# Patient Record
Sex: MALE | Race: BLACK OR AFRICAN AMERICAN | Employment: OTHER | URBAN - METROPOLITAN AREA
[De-identification: names, ages, dates, MRNs, and addresses within clinical notes are randomized per-mention and may not be internally consistent; named-entity substitution may affect disease eponyms.]

---

## 2017-06-02 ENCOUNTER — HOSPITAL ENCOUNTER (OUTPATIENT)
Dept: LAB | Age: 72
Discharge: HOME | End: 2017-06-02
Attending: INTERNAL MEDICINE
Payer: MEDICARE

## 2017-06-02 DIAGNOSIS — R06.02 SHORTNESS OF BREATH: ICD-10-CM

## 2017-06-02 LAB
BASOPHILS # BLD AUTO: 0 K/UL (ref 0–0.2)
BASOPHILS # BLD: 0 % (ref 0–2)
DIFFERENTIAL METHOD BLD: ABNORMAL
EOSINOPHIL # BLD: 0.2 K/UL (ref 0–0.8)
EOSINOPHIL NFR BLD: 4 % (ref 0.5–7.8)
ERYTHROCYTE [DISTWIDTH] IN BLOOD BY AUTOMATED COUNT: 13.2 % (ref 11.9–14.6)
HCT VFR BLD AUTO: 37.6 % (ref 41.1–50.3)
HGB BLD-MCNC: 12.3 G/DL (ref 13.6–17.2)
LYMPHOCYTES # BLD AUTO: 26 % (ref 13–44)
LYMPHOCYTES # BLD: 1.2 K/UL (ref 0.5–4.6)
MCH RBC QN AUTO: 30 PG (ref 26.1–32.9)
MCHC RBC AUTO-ENTMCNC: 32.7 G/DL (ref 31.4–35)
MCV RBC AUTO: 91.7 FL (ref 79.6–97.8)
MONOCYTES # BLD: 0.7 K/UL (ref 0.1–1.3)
MONOCYTES NFR BLD AUTO: 16 % (ref 4–12)
NEUTS SEG # BLD: 2.4 K/UL (ref 1.7–8.2)
NEUTS SEG NFR BLD AUTO: 54 % (ref 43–78)
PLATELET # BLD AUTO: 159 K/UL (ref 150–450)
PMV BLD AUTO: 8.8 FL (ref 10.8–14.1)
RBC # BLD AUTO: 4.1 M/UL (ref 4.23–5.67)
WBC # BLD AUTO: 4.5 K/UL (ref 4.3–11.1)

## 2017-06-02 PROCEDURE — 36415 COLL VENOUS BLD VENIPUNCTURE: CPT | Performed by: INTERNAL MEDICINE

## 2017-06-02 PROCEDURE — 85025 COMPLETE CBC W/AUTO DIFF WBC: CPT | Performed by: INTERNAL MEDICINE

## 2018-01-03 PROBLEM — I35.8 AORTIC VALVE SCLEROSIS: Status: ACTIVE | Noted: 2018-01-03

## 2018-01-03 PROBLEM — Z95.1 HX OF CABG: Status: ACTIVE | Noted: 2018-01-03

## 2018-03-08 ENCOUNTER — ANESTHESIA EVENT (OUTPATIENT)
Dept: ENDOSCOPY | Age: 73
End: 2018-03-08
Payer: MEDICARE

## 2018-03-08 RX ORDER — SODIUM CHLORIDE, SODIUM LACTATE, POTASSIUM CHLORIDE, CALCIUM CHLORIDE 600; 310; 30; 20 MG/100ML; MG/100ML; MG/100ML; MG/100ML
100 INJECTION, SOLUTION INTRAVENOUS CONTINUOUS
Status: CANCELLED | OUTPATIENT
Start: 2018-03-08

## 2018-03-08 RX ORDER — SODIUM CHLORIDE 0.9 % (FLUSH) 0.9 %
5-10 SYRINGE (ML) INJECTION AS NEEDED
Status: CANCELLED | OUTPATIENT
Start: 2018-03-08

## 2018-03-09 ENCOUNTER — ANESTHESIA (OUTPATIENT)
Dept: ENDOSCOPY | Age: 73
End: 2018-03-09
Payer: MEDICARE

## 2018-03-09 ENCOUNTER — HOSPITAL ENCOUNTER (OUTPATIENT)
Age: 73
Setting detail: OUTPATIENT SURGERY
Discharge: HOME | End: 2018-03-09
Attending: SURGERY | Admitting: SURGERY
Payer: MEDICARE

## 2018-03-09 VITALS
TEMPERATURE: 96.8 F | WEIGHT: 198 LBS | HEIGHT: 71 IN | DIASTOLIC BLOOD PRESSURE: 76 MMHG | RESPIRATION RATE: 18 BRPM | HEART RATE: 57 BPM | SYSTOLIC BLOOD PRESSURE: 139 MMHG | BODY MASS INDEX: 27.72 KG/M2 | OXYGEN SATURATION: 100 %

## 2018-03-09 PROCEDURE — 74011250636 HC RX REV CODE- 250/636

## 2018-03-09 PROCEDURE — 77030011640 HC PAD GRND REM COVD -A: Performed by: SURGERY

## 2018-03-09 PROCEDURE — 88305 TISSUE EXAM BY PATHOLOGIST: CPT | Performed by: SURGERY

## 2018-03-09 PROCEDURE — 74011250636 HC RX REV CODE- 250/636: Performed by: ANESTHESIOLOGY

## 2018-03-09 PROCEDURE — 77030009426 HC FCPS BIOP ENDOSC BSC -B: Performed by: SURGERY

## 2018-03-09 PROCEDURE — 76040000025: Performed by: SURGERY

## 2018-03-09 PROCEDURE — 76060000032 HC ANESTHESIA 0.5 TO 1 HR: Performed by: SURGERY

## 2018-03-09 PROCEDURE — 74011000250 HC RX REV CODE- 250

## 2018-03-09 RX ORDER — LIDOCAINE HYDROCHLORIDE 20 MG/ML
INJECTION, SOLUTION EPIDURAL; INFILTRATION; INTRACAUDAL; PERINEURAL AS NEEDED
Status: DISCONTINUED | OUTPATIENT
Start: 2018-03-09 | End: 2018-03-09 | Stop reason: HOSPADM

## 2018-03-09 RX ORDER — PROPOFOL 10 MG/ML
INJECTION, EMULSION INTRAVENOUS AS NEEDED
Status: DISCONTINUED | OUTPATIENT
Start: 2018-03-09 | End: 2018-03-09 | Stop reason: HOSPADM

## 2018-03-09 RX ORDER — PROPOFOL 10 MG/ML
INJECTION, EMULSION INTRAVENOUS
Status: DISCONTINUED | OUTPATIENT
Start: 2018-03-09 | End: 2018-03-09 | Stop reason: HOSPADM

## 2018-03-09 RX ORDER — SODIUM CHLORIDE, SODIUM LACTATE, POTASSIUM CHLORIDE, CALCIUM CHLORIDE 600; 310; 30; 20 MG/100ML; MG/100ML; MG/100ML; MG/100ML
100 INJECTION, SOLUTION INTRAVENOUS CONTINUOUS
Status: DISCONTINUED | OUTPATIENT
Start: 2018-03-09 | End: 2018-03-09 | Stop reason: HOSPADM

## 2018-03-09 RX ADMIN — PROPOFOL 40 MG: 10 INJECTION, EMULSION INTRAVENOUS at 08:13

## 2018-03-09 RX ADMIN — PROPOFOL 160 MCG/KG/MIN: 10 INJECTION, EMULSION INTRAVENOUS at 08:13

## 2018-03-09 RX ADMIN — SODIUM CHLORIDE, SODIUM LACTATE, POTASSIUM CHLORIDE, AND CALCIUM CHLORIDE 100 ML/HR: 600; 310; 30; 20 INJECTION, SOLUTION INTRAVENOUS at 07:34

## 2018-03-09 RX ADMIN — LIDOCAINE HYDROCHLORIDE 60 MG: 20 INJECTION, SOLUTION EPIDURAL; INFILTRATION; INTRACAUDAL; PERINEURAL at 08:13

## 2018-03-09 NOTE — ANESTHESIA POSTPROCEDURE EVALUATION
Post-Anesthesia Evaluation and Assessment    Patient: Gloria Pizarro Sr. MRN: [id number]  SSN: [id number]    YOB: 1945  Age: 68 y.o. Sex: male       Cardiovascular Function/Vital Signs  Visit Vitals    /67    Pulse (!) 50    Temp 36 °C (96.8 °F)    Resp 16    Ht 5' 11\" (1.803 m)    Wt 89.8 kg (198 lb)    SpO2 100%    BMI 27.62 kg/m2       Patient is status post total IV anesthesia anesthesia for Procedure(s):  COLONOSCOPY/ 28  ENDOSCOPIC POLYPECTOMY. Nausea/Vomiting: None    Postoperative hydration reviewed and adequate. Pain:  Pain Scale 1: Visual (03/09/18 0844)  Pain Intensity 1: 0 (03/09/18 0844)   Managed    Neurological Status: At baseline    Mental Status and Level of Consciousness: Awake. Pulmonary Status:   O2 Device: Nasal cannula (03/09/18 0849)   Adequate oxygenation and airway patent    Complications related to anesthesia: None    Post-anesthesia assessment completed.  No concerns    Signed By: Jennifer An MD     March 9, 2018

## 2018-03-09 NOTE — H&P
Colonoscopy History and Physical      Patient: Yousif Duckworth Sr.    Physician: Naima Hassan MD    Referring Physician: Joshua Zavaleta MD    Chief Complaint: For colonoscopy    History of Present Illness: Pt presents for colonoscopy. He reports 2 prior exams- polyps on first exam about 7 years ago, then a negative f/u exam 5+ years ago with f/u recommended for 5 years by endoscopist.    History:  Past Medical History:   Diagnosis Date    Arrhythmia 8/2015 at [facility]    ablation--- dr Sukhdeep Casey    Aspirin long-term use     CAD (coronary artery disease)     mi--2/2015 cabg 2/2015----     CAD in native artery 2/19/2015    2/19/15 (Dr Mayo Díaz) Coronary artery bypass grafting x2 with grafts consisting of bilateral mammaries     Elevated hemidiaphragm 2/21/2015 2/20 barium swallow on 2/12/15 that revealed an unremarkable esophagus and paralyzed left diaphragm with an unusual rotation of the stomach in the left upper quadrant  esophagram 2/12/2015 which revealed an abnormal contour the left hemidiaphragm, suggesting diaphragmatic hernia. 2/20 Barium Swallow Barium swallow today to assess for any signs of possible torsion. If no torsion present, will likely recommend outpatient surgical consult and continued follow up with Dr. Buena Spurling in [facility].       Elevated hemoglobin A1c 2/20/2015    6.1 - 2/17/15     Full dentures 2/21/2015    GERD (gastroesophageal reflux disease)     controlled with med    Hernia of abdominal cavity 6/26/2015    History of atrial flutter 8/2015    ablation    History of complete eye exam 01/01/2015    [facility]    Hypercholesterolemia     Hyperlipidemia     Hyperlipidemia 2/18/2015    Hypertension     controlled with med    Hypoxemia 2/21/2015    MI, old 2/2015    NSTEMI (non-ST elevated myocardial infarction) (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 Susan Mcallister) R [facility] Radiofrequency ablation     Wears dentures      Past Surgical History:   Procedure Laterality Date    HX COLONOSCOPY  2012    HX CORONARY ARTERY BYPASS GRAFT  2/15    DR. Patricia Perez HX FRACTURE TX Right 2007    elbow fx    HX HEART CATHETERIZATION  2/2015 and 8/15    HX HEART CATHETERIZATION  8/2015    ablation    HX HERNIA REPAIR  7741    umbilical    HX HERNIA REPAIR Left     [facility]    HX KNEE ARTHROSCOPY Left 2006    left knee    HX VEIN STRIPPING        Social History     Social History    Marital status: SINGLE     Spouse name: N/A    Number of children: N/A    Years of education: N/A     Social History Main Topics    Smoking status: Former Smoker     Packs/day: 0.25     Years: 40.00     Quit date: 4/26/2001    Smokeless tobacco: Never Used      Comment: quit 2004    Alcohol use No    Drug use: No    Sexual activity: Not on file     Other Topics Concern    Not on file     Social History Narrative      Family History   Problem Relation Age of Onset    Heart Disease Brother     Hypertension Brother     Heart Disease Father     Hypertension Sister     Hypertension Brother     No Known Problems Mother        Medications:   Prior to Admission medications    Medication Sig Start Date End Date Taking? Authorizing Provider   omeprazole (PRILOSEC) 20 mg capsule TAKE 1 CAPSULE TWO TIMES A DAY FOR GASTROESOPHAGEAL REFLUX 1/18/18  Yes Cheryle Acevedo MD   metoprolol tartrate (LOPRESSOR) 25 mg tablet TAKE 1 TABLET TWICE DAILY FOR HYPERTENSION 1/18/18  Yes Cheryle Acevedo MD   lisinopril (PRINIVIL, ZESTRIL) 20 mg tablet TAKE 1 TABLET TWICE DAILY FOR HYPERTENSION 1/18/18  Yes Cheryle Acevedo MD   doxazosin (CARDURA) 8 mg tablet Take 1 Tab by mouth two (2) times a day. 10/26/17  Yes Lexa Lucas MD   pravastatin (PRAVACHOL) 40 mg tablet TAKE 1 TABLET TWICE DAILY  (DOSE  INCREASE) 10/11/17  Yes Lexa Lucas MD   ferrous sulfate 325 mg (65 mg iron) tablet Take 325 mg by mouth Daily (before breakfast). Yes Historical Provider   aspirin delayed-release 81 mg tablet Take 81 mg by mouth every morning. Indications: continue   Yes Historical Provider   Fish Oil-Omega-3 Fatty Acids ([address] [place] [address] Road 3-6-9) 300-1,000 mg cpDR Take 2 Tabs by mouth every morning. Indications: last dose 3/7/18   Yes Historical Provider   MULTIVITS-MINERALS/FA/LYCOPENE (ONE-A-DAY MEN'S PO) Take 1 Tab by mouth daily. Indications: last dose 3/7/18   Yes Historical Provider   CALCIUM CARB/VIT D3/MINERALS (CALCIUM-VITAMIN D PO) Take 1 Tab by mouth nightly. Yes Historical Provider       Allergies: No Known Allergies    Physical Exam:     Vital Signs:   Visit Vitals    /67    Pulse 96    Temp 98 °F (36.7 °C)    Ht 5' 11\" (1.803 m)    Wt 198 lb (89.8 kg)    SpO2 98%    BMI 27.62 kg/m2     . General: in NAD      Heart: regular   Lungs: unlabored   Abdominal: soft   Neurological: grossly normal        Findings/Diagnosis: Screening for colorectal cancer     Plan of Care/Planned Procedure: Colonoscopy. Risks of endoscopy include  bleeding, perforation. They understand and agree to proceed.       Signed:  Soniya Marcos MD   3/9/2018

## 2018-03-09 NOTE — IP AVS SNAPSHOT
[facility] 
 
 
 [address] [facility] 
[phone] Patient: Jim Self Sr. MRN: [id number] FHI:9[birth year]21/[birth year] About your hospitalization You were admitted on:  March 9, 2018 You last received care in the:  D ENDOSCOPY You were discharged on:  March 9, 2018 Why you were hospitalized Your primary diagnosis was:  Not on File Follow-up Information None Discharge Orders None A check patrick indicates which time of day the medication should be taken. My Medications ASK your doctor about these medications Instructions Each Dose to Equal  
 Morning Noon Evening Bedtime  
 aspirin delayed-release 81 mg tablet Your last dose was: Your next dose is: Take 81 mg by mouth every morning. Indications: continue 81 mg CALCIUM-VITAMIN D PO Your last dose was: Your next dose is: Take 1 Tab by mouth nightly. 1 Tab  
    
   
   
   
  
 doxazosin 8 mg tablet Commonly known as:  CARDURA Your last dose was: Your next dose is: Take 1 Tab by mouth two (2) times a day. 8 mg  
    
   
   
   
  
 ferrous sulfate 325 mg (65 mg iron) tablet Your last dose was: Your next dose is: Take 325 mg by mouth Daily (before breakfast). 325 mg FISH OIL OMEGA 3-6-9 300-1,000 mg Cpdr  
Generic drug:  Fish Oil-Omega-3 Fatty Acids Your last dose was: Your next dose is: Take 2 Tabs by mouth every morning. Indications: last dose 3/7/18  
 2 Tab  
    
   
   
   
  
 lisinopril 20 mg tablet Commonly known as:  Edwina Smyth Your last dose was: Your next dose is: TAKE 1 TABLET TWICE DAILY FOR HYPERTENSION  
     
   
   
   
  
 metoprolol tartrate 25 mg tablet Commonly known as:  LOPRESSOR Your last dose was: Your next dose is: TAKE 1 TABLET TWICE DAILY FOR HYPERTENSION  
     
   
   
   
  
 omeprazole 20 mg capsule Commonly known as:  PRILOSEC Your last dose was: Your next dose is: TAKE 1 CAPSULE TWO TIMES A DAY FOR GASTROESOPHAGEAL REFLUX  
     
   
   
   
  
 ONE-A-DAY MEN'S PO Your last dose was: Your next dose is: Take 1 Tab by mouth daily. Indications: last dose 3/7/18  
 1 Tab  
    
   
   
   
  
 pravastatin 40 mg tablet Commonly known as:  PRAVACHOL Your last dose was: Your next dose is: TAKE 1 TABLET TWICE DAILY  (DOSE  INCREASE) Discharge Instructions Gastrointestinal Colonoscopy/Flexible Sigmoidoscopy - Lower Exam Discharge Instructions 1. Call Dr. Sonido Carias for any problems or questions. 2. Contact the doctors office for follow up appointment as directed 3. Medication may cause drowsiness for several hours, therefore, do not drive or operate machinery for remainder of the day. 4. No alcohol today. 5. Ordinarily, you may resume regular diet and activity after exam unless otherwise specified by your physician. 6. Because of air put into your colon during exam, you may experience some abdominal distension, relieved by the passage of gas, for several hours. 7. Contact your physician if you have any of the following: 
a. Excessive amount of bleeding  large amount when having a bowel movement. Occasional specks of blood with bowel movement would not be unusual. 
b. Severe abdominal pain 
c. Fever or Chills 8. Polyp Removal  follow these additional instructions 
a. Take Metamucil  1 tablespoon in juice every morning for 3 days b. No Aspirin, Advil, Aleve, Nuprin, Ibuprofen, or medications that contain these drugs for 2 weeks. Any additional instructions:  Check pathology in one week. Instructions given to Brian Dela Cruz Sr. and other family members. Instructions given by: Dr. Smith Walton O Transitions of Care Introducing [employer] offers a voluntary care coordination program to provide high quality service and care to [facility] fee-for-service beneficiaries. Shahbaz Flaherty was designed to help you enhance your health and well-being through the following services: ? Transitions of Care  support for individuals who are transitioning from one care setting to another (example: Hospital to home). ? Chronic and Complex Care Coordination  support for individuals and caregivers of those with serious or chronic illnesses or with more than one chronic (ongoing) condition and those who take a number of different medications. If you meet specific medical criteria, a [facility] [address] may call you directly to coordinate your care with your primary care physician and your other care providers. For questions about the [facility] programs, please, contact your physicians office. For general questions or additional information about Accountable Care Organizations: 
Please visit www.medicare.gov/acos. html or call 1-800-MEDICARE (0-159.388.7773) TTY users should call 0-226.143.6414. Introducing [address] [facility] introduces [employer] patient portal. Now you can access parts of your medical record, email your doctor's office, and request medication refills online. 1. In your internet browser, go to [email or web address] 2. Click on the First Time User? Click Here link in the Sign In box. You will see the New Member Sign Up page. 3. Enter your [employer] Access Code exactly as it appears below. You will not need to use this code after youve completed the sign-up process. If you do not sign up before the expiration date, you must request a new code. · [employer] Access Code: [email or web address] Expires: 6/4/2018  1:25 PM 
 
 4. Enter the last four digits of your Social Security Number (xxxx) and Date of Birth (mm/dd/yyyy) as indicated and click Submit. You will be taken to the next sign-up page. 5. Create a [employer] ID. This will be your [employer] login ID and cannot be changed, so think of one that is secure and easy to remember. 6. Create a [employer] password. You can change your password at any time. 7. Enter your Password Reset Question and Answer. This can be used at a later time if you forget your password. 8. Enter your e-mail address. You will receive e-mail notification when new information is available in [address] 9. Click Sign Up. You can now view and download portions of your medical record. 10. Click the Download Summary menu link to download a portable copy of your medical information. If you have questions, please visit the Frequently Asked Questions section of the [employer] website. Remember, [employer] is NOT to be used for urgent needs. For medical emergencies, dial 911. Now available from your iPhone and Android! Providers Seen During Your Hospitalization Provider Specialty Primary office phone Belkis Lenz MD General Surgery [phone] Your Primary Care Physician (PCP) Primary Care Physician Office Phone Office Fax Namrata Barclay [phone] You are allergic to the following No active allergies Recent Documentation Height Weight BMI Smoking Status 1.803 m 89.8 kg 27.62 kg/m2 Former Smoker Emergency Contacts Name Discharge Info Relation Home Work Mobile Angus Caryign [24] [phone] Cristino Reyes DISCHARGE CAREGIVER [3] Son [22] [phone] Patient Belongings The following personal items are in your possession at time of discharge: 
  Dental Appliances: At bedside  Visual Aid: Glasses, At bedside Please provide this summary of care documentation to your next provider. Signatures-by signing, you are acknowledging that this After Visit Summary has been reviewed with you and you have received a copy. Patient Signature:  ____________________________________________________________ Date:  ____________________________________________________________  
  
Agata Mealing Provider Signature:  ____________________________________________________________ Date:  ____________________________________________________________

## 2018-03-09 NOTE — ANESTHESIA PREPROCEDURE EVALUATION
Anesthetic History   No history of anesthetic complications            Review of Systems / Medical History  Pertinent labs reviewed    Pulmonary  Within defined limits                 Neuro/Psych   Within defined limits           Cardiovascular    Hypertension        Dysrhythmias (s/p ablation 2015) : atrial flutter  Past MI (2015), CAD and CABG (2015)    Exercise tolerance: >4 METS     GI/Hepatic/Renal     GERD           Endo/Other        Arthritis     Other Findings            Physical Exam    Airway  Mallampati: I  TM Distance: 4 - 6 cm  Neck ROM: normal range of motion   Mouth opening: Normal     Cardiovascular  Regular rate and rhythm,  S1 and S2 normal,  no murmur, click, rub, or gallop             Dental    Dentition: Full lower dentures and Full upper dentures     Pulmonary  Breath sounds clear to auscultation               Abdominal  GI exam deferred       Other Findings            Anesthetic Plan    ASA: 3  Anesthesia type: total IV anesthesia          Induction: Intravenous  Anesthetic plan and risks discussed with: Patient

## 2018-03-09 NOTE — DISCHARGE INSTRUCTIONS
Gastrointestinal Colonoscopy/Flexible Sigmoidoscopy - Lower Exam Discharge Instructions  1. Call Dr. Bibiana Dawkins for any problems or questions. 2. Contact the doctors office for follow up appointment as directed  3. Medication may cause drowsiness for several hours, therefore, do not drive or operate machinery for remainder of the day. 4. No alcohol today. 5. Ordinarily, you may resume regular diet and activity after exam unless otherwise specified by your physician. 6. Because of air put into your colon during exam, you may experience some abdominal distension, relieved by the passage of gas, for several hours. 7. Contact your physician if you have any of the following:  a. Excessive amount of bleeding - large amount when having a bowel movement. Occasional specks of blood with bowel movement would not be unusual.  b. Severe abdominal pain  c. Fever or Chills  8. Polyp Removal - follow these additional instructions  a. Take Metamucil - 1 tablespoon in juice every morning for 3 days  b. No Aspirin, Advil, Aleve, Nuprin, Ibuprofen, or medications that contain these drugs for 2 weeks. Any additional instructions:  Check pathology in one week. Instructions given to Zhou Agarwal Sr. and other family members.   Instructions given by: Dr. Bibiana Dawkins

## 2018-03-09 NOTE — ROUTINE PROCESS
Discharge instructions given to son. IV discontinued with skin c/d/i. Pt has passed flatus and tolerating liquids well. Pt ready for discharge.

## 2018-03-09 NOTE — PROCEDURES
Procedure in Detail:  Informed consent was obtained for the procedure. The patient was placed in the left lateral decubitus position and sedation was induced by anesthesia. The [id number] was inserted into the rectum and advanced under direct vision to the cecum, which was identified by the appendiceal orifice. The quality of the colonic preparation was adequate. A careful inspection was made as the colonoscope was withdrawn, including a retroflexed view of the rectum; findings and interventions are described below. Appropriate photodocumentation was obtained. Findings:   Rectum:   Normal  Sigmoid:     - Excavated lesions:     - Diverticulosis  Descending Colon:     - Excavated lesions:     - Diverticulosis    - Protruding lesions:     -Sessile Polyp(s) size 4 mm removed by polypectomy (hot biopsy)  Transverse Colon:     - Excavated lesions:     - Diverticulosis  Ascending Colon:   Normal  Cecum:   Normal          Specimens: Specimens were collected and sent to pathology. Complications: None; patient tolerated the procedure well. \    EBL - none    Recommendations:   - Await pathology. - If adenoma is present, repeat colonoscopy in 5 years.      Signed By: Nitish Harper MD                        March 9, 2018

## 2018-12-05 ENCOUNTER — HOSPITAL ENCOUNTER (OUTPATIENT)
Dept: GENERAL RADIOLOGY | Age: 73
Discharge: HOME | End: 2018-12-05
Attending: UROLOGY
Payer: MEDICARE

## 2018-12-05 ENCOUNTER — HOSPITAL ENCOUNTER (OUTPATIENT)
Dept: SURGERY | Age: 73
Discharge: HOME | End: 2018-12-05
Payer: MEDICARE

## 2018-12-05 VITALS
OXYGEN SATURATION: 98 % | SYSTOLIC BLOOD PRESSURE: 149 MMHG | TEMPERATURE: 98.2 F | WEIGHT: 203.25 LBS | DIASTOLIC BLOOD PRESSURE: 76 MMHG | BODY MASS INDEX: 30.1 KG/M2 | HEART RATE: 55 BPM | RESPIRATION RATE: 16 BRPM | HEIGHT: 69 IN

## 2018-12-05 LAB
ANION GAP SERPL CALC-SCNC: 5 MMOL/L (ref 7–16)
BUN SERPL-MCNC: 10 MG/DL (ref 8–23)
CALCIUM SERPL-MCNC: 8.9 MG/DL (ref 8.3–10.4)
CHLORIDE SERPL-SCNC: 104 MMOL/L (ref 98–107)
CO2 SERPL-SCNC: 29 MMOL/L (ref 21–32)
CREAT SERPL-MCNC: 0.71 MG/DL (ref 0.8–1.5)
ERYTHROCYTE [DISTWIDTH] IN BLOOD BY AUTOMATED COUNT: 13.6 % (ref 11.9–14.6)
GLUCOSE SERPL-MCNC: 93 MG/DL (ref 65–100)
HCT VFR BLD AUTO: 39.3 % (ref 41.1–50.3)
HGB BLD-MCNC: 12.6 G/DL (ref 13.6–17.2)
MCH RBC QN AUTO: 29.8 PG (ref 26.1–32.9)
MCHC RBC AUTO-ENTMCNC: 32.1 G/DL (ref 31.4–35)
MCV RBC AUTO: 92.9 FL (ref 79.6–97.8)
NRBC # BLD: 0 K/UL (ref 0–0.2)
PLATELET # BLD AUTO: 168 K/UL (ref 150–450)
PMV BLD AUTO: 9.3 FL (ref 9.4–12.3)
POTASSIUM SERPL-SCNC: 4.4 MMOL/L (ref 3.5–5.1)
RBC # BLD AUTO: 4.23 M/UL (ref 4.23–5.6)
SODIUM SERPL-SCNC: 138 MMOL/L (ref 136–145)
WBC # BLD AUTO: 4.1 K/UL (ref 4.3–11.1)

## 2018-12-05 PROCEDURE — 85027 COMPLETE CBC AUTOMATED: CPT

## 2018-12-05 PROCEDURE — 71045 X-RAY EXAM CHEST 1 VIEW: CPT

## 2018-12-05 PROCEDURE — 80048 BASIC METABOLIC PNL TOTAL CA: CPT

## 2018-12-05 PROCEDURE — 93005 ELECTROCARDIOGRAM TRACING: CPT | Performed by: UROLOGY

## 2018-12-05 NOTE — PERIOP NOTES
Patient verified name and . Patient provided medical/health information and PTA medications to the best of their ability. TYPE  CASE:ll 
Order for consent  found in EHR and matches case posting. Labs per surgeon:CBC, BMP, CXR, EKG. Labs per anesthesia protocol: None. EKG  :  Today, and 2017 Rt BBB and PAC's. Hx CABG x2, No stents. Hx AFlutter Ablation. No cardiac events since procedures. Patient provided with and instructed on education handouts including Guide to Surgery, blood transfusions, pain management, and hand hygiene for the family and community, and [facility] brochure. Antibacterial soap and HIbiclens instructions given per hospital policy. Instructed patient to continue previous medications as prescribed prior to surgery unless otherwise directed and to take the following medications the day of surgery according to anesthesia guidelines : ASA 81 mg, Doxazosin, Metoprolol, Omeprazole, Pravastatin . Instructed patient to hold  the following medications: Vitamins / Supplements. Original medication prescription bottles were visualized during patient appointment. Patient teach back successful and patient demonstrates knowledge of instruction.

## 2018-12-05 NOTE — PERIOP NOTES
Labs reviewed and routed to surgeon. Recent Results (from the past 12 hour(s)) CBC W/O DIFF Collection Time: 12/05/18 11:33 AM  
Result Value Ref Range WBC 4.1 (L) 4.3 - 11.1 K/uL  
 RBC 4.23 4.23 - 5.6 M/uL  
 HGB 12.6 (L) 13.6 - 17.2 g/dL HCT 39.3 (L) 41.1 - 50.3 % MCV 92.9 79.6 - 97.8 FL  
 MCH 29.8 26.1 - 32.9 PG  
 MCHC 32.1 31.4 - 35.0 g/dL  
 RDW 13.6 11.9 - 14.6 % PLATELET 654 044 - 306 K/uL MPV 9.3 (L) 9.4 - 12.3 FL ABSOLUTE NRBC 0.00 0.0 - 0.2 K/uL METABOLIC PANEL, BASIC Collection Time: 12/05/18 11:33 AM  
Result Value Ref Range Sodium 138 136 - 145 mmol/L Potassium 4.4 3.5 - 5.1 mmol/L Chloride 104 98 - 107 mmol/L  
 CO2 29 21 - 32 mmol/L Anion gap 5 (L) 7 - 16 mmol/L Glucose 93 65 - 100 mg/dL BUN 10 8 - 23 MG/DL Creatinine 0.71 (L) 0.8 - 1.5 MG/DL  
 GFR est AA >60 >60 ml/min/1.73m2 GFR est non-AA >60 >60 ml/min/1.73m2  Calcium 8.9 8.3 - 10.4 MG/DL

## 2018-12-06 LAB
ATRIAL RATE: 55 BPM
CALCULATED P AXIS, ECG09: 40 DEGREES
CALCULATED R AXIS, ECG10: -17 DEGREES
CALCULATED T AXIS, ECG11: 56 DEGREES
DIAGNOSIS, 93000: NORMAL
P-R INTERVAL, ECG05: 160 MS
Q-T INTERVAL, ECG07: 432 MS
QRS DURATION, ECG06: 106 MS
QTC CALCULATION (BEZET), ECG08: 413 MS
VENTRICULAR RATE, ECG03: 55 BPM

## 2018-12-10 ENCOUNTER — ANESTHESIA EVENT (OUTPATIENT)
Dept: SURGERY | Age: 73
End: 2018-12-10
Payer: MEDICARE

## 2018-12-10 NOTE — PERIOP NOTES
Evangelist Carrasco at Dr Reinier Stover office notified that orders under second sign and held can not be released done and she said she would try and take care of this.

## 2018-12-11 ENCOUNTER — ANESTHESIA (OUTPATIENT)
Dept: SURGERY | Age: 73
End: 2018-12-11
Payer: MEDICARE

## 2018-12-11 ENCOUNTER — HOSPITAL ENCOUNTER (OUTPATIENT)
Age: 73
Setting detail: OUTPATIENT SURGERY
Discharge: HOME | End: 2018-12-11
Attending: UROLOGY | Admitting: UROLOGY
Payer: MEDICARE

## 2018-12-11 VITALS
TEMPERATURE: 97.6 F | RESPIRATION RATE: 16 BRPM | WEIGHT: 203 LBS | DIASTOLIC BLOOD PRESSURE: 67 MMHG | HEART RATE: 61 BPM | SYSTOLIC BLOOD PRESSURE: 119 MMHG | OXYGEN SATURATION: 91 % | BODY MASS INDEX: 29.98 KG/M2

## 2018-12-11 PROCEDURE — 74011000258 HC RX REV CODE- 258: Performed by: UROLOGY

## 2018-12-11 PROCEDURE — 77030002986 HC SUT PROL J&J -A: Performed by: UROLOGY

## 2018-12-11 PROCEDURE — 74011250636 HC RX REV CODE- 250/636: Performed by: ANESTHESIOLOGY

## 2018-12-11 PROCEDURE — 77030031139 HC SUT VCRL2 J&J -A: Performed by: UROLOGY

## 2018-12-11 PROCEDURE — 74011250636 HC RX REV CODE- 250/636

## 2018-12-11 PROCEDURE — 77030037088 HC TUBE ENDOTRACH ORAL NSL COVD-A: Performed by: ANESTHESIOLOGY

## 2018-12-11 PROCEDURE — 77030008462 HC STPLR SKN PROX J&J -A: Performed by: UROLOGY

## 2018-12-11 PROCEDURE — 76210000026 HC REC RM PH II 1 TO 1.5 HR: Performed by: UROLOGY

## 2018-12-11 PROCEDURE — 77030003028 HC SUT VCRL J&J -A: Performed by: UROLOGY

## 2018-12-11 PROCEDURE — 76210000006 HC OR PH I REC 0.5 TO 1 HR: Performed by: UROLOGY

## 2018-12-11 PROCEDURE — 77030018836 HC SOL IRR NACL ICUM -A: Performed by: UROLOGY

## 2018-12-11 PROCEDURE — 74011000250 HC RX REV CODE- 250

## 2018-12-11 PROCEDURE — 74011250636 HC RX REV CODE- 250/636: Performed by: UROLOGY

## 2018-12-11 PROCEDURE — 77030039425 HC BLD LARYNG TRULITE DISP TELE -A: Performed by: ANESTHESIOLOGY

## 2018-12-11 PROCEDURE — 77030032490 HC SLV COMPR SCD KNE COVD -B: Performed by: UROLOGY

## 2018-12-11 PROCEDURE — C1781 MESH (IMPLANTABLE): HCPCS | Performed by: UROLOGY

## 2018-12-11 PROCEDURE — 74011000250 HC RX REV CODE- 250: Performed by: UROLOGY

## 2018-12-11 PROCEDURE — 76060000038 HC ANESTHESIA 3.5 TO 4 HR: Performed by: UROLOGY

## 2018-12-11 PROCEDURE — 76010000133 HC OR TIME 3 TO 3.5 HR: Performed by: UROLOGY

## 2018-12-11 DEVICE — MESH HERN W3XL6IN INGUINAL POLYPR MFIL RECTANG: Type: IMPLANTABLE DEVICE | Site: UMBILICAL | Status: FUNCTIONAL

## 2018-12-11 RX ORDER — FENTANYL CITRATE 50 UG/ML
INJECTION, SOLUTION INTRAMUSCULAR; INTRAVENOUS AS NEEDED
Status: DISCONTINUED | OUTPATIENT
Start: 2018-12-11 | End: 2018-12-11 | Stop reason: HOSPADM

## 2018-12-11 RX ORDER — ROCURONIUM BROMIDE 10 MG/ML
INJECTION, SOLUTION INTRAVENOUS AS NEEDED
Status: DISCONTINUED | OUTPATIENT
Start: 2018-12-11 | End: 2018-12-11 | Stop reason: HOSPADM

## 2018-12-11 RX ORDER — GLYCOPYRROLATE 0.2 MG/ML
INJECTION INTRAMUSCULAR; INTRAVENOUS AS NEEDED
Status: DISCONTINUED | OUTPATIENT
Start: 2018-12-11 | End: 2018-12-11 | Stop reason: HOSPADM

## 2018-12-11 RX ORDER — MIDAZOLAM HYDROCHLORIDE 1 MG/ML
2 INJECTION, SOLUTION INTRAMUSCULAR; INTRAVENOUS ONCE
Status: DISCONTINUED | OUTPATIENT
Start: 2018-12-11 | End: 2018-12-11 | Stop reason: HOSPADM

## 2018-12-11 RX ORDER — SODIUM CHLORIDE, SODIUM LACTATE, POTASSIUM CHLORIDE, CALCIUM CHLORIDE 600; 310; 30; 20 MG/100ML; MG/100ML; MG/100ML; MG/100ML
100 INJECTION, SOLUTION INTRAVENOUS CONTINUOUS
Status: DISCONTINUED | OUTPATIENT
Start: 2018-12-11 | End: 2018-12-11 | Stop reason: HOSPADM

## 2018-12-11 RX ORDER — EPHEDRINE SULFATE 50 MG/ML
INJECTION, SOLUTION INTRAVENOUS AS NEEDED
Status: DISCONTINUED | OUTPATIENT
Start: 2018-12-11 | End: 2018-12-11 | Stop reason: HOSPADM

## 2018-12-11 RX ORDER — HYDROMORPHONE HYDROCHLORIDE 2 MG/ML
0.5 INJECTION, SOLUTION INTRAMUSCULAR; INTRAVENOUS; SUBCUTANEOUS
Status: DISCONTINUED | OUTPATIENT
Start: 2018-12-11 | End: 2018-12-11 | Stop reason: HOSPADM

## 2018-12-11 RX ORDER — PROPOFOL 10 MG/ML
INJECTION, EMULSION INTRAVENOUS AS NEEDED
Status: DISCONTINUED | OUTPATIENT
Start: 2018-12-11 | End: 2018-12-11 | Stop reason: HOSPADM

## 2018-12-11 RX ORDER — KETOROLAC TROMETHAMINE 30 MG/ML
INJECTION, SOLUTION INTRAMUSCULAR; INTRAVENOUS AS NEEDED
Status: DISCONTINUED | OUTPATIENT
Start: 2018-12-11 | End: 2018-12-11 | Stop reason: HOSPADM

## 2018-12-11 RX ORDER — ONDANSETRON 2 MG/ML
INJECTION INTRAMUSCULAR; INTRAVENOUS AS NEEDED
Status: DISCONTINUED | OUTPATIENT
Start: 2018-12-11 | End: 2018-12-11 | Stop reason: HOSPADM

## 2018-12-11 RX ORDER — NEOSTIGMINE METHYLSULFATE 1 MG/ML
INJECTION INTRAVENOUS AS NEEDED
Status: DISCONTINUED | OUTPATIENT
Start: 2018-12-11 | End: 2018-12-11 | Stop reason: HOSPADM

## 2018-12-11 RX ORDER — MIDAZOLAM HYDROCHLORIDE 1 MG/ML
2 INJECTION, SOLUTION INTRAMUSCULAR; INTRAVENOUS
Status: DISCONTINUED | OUTPATIENT
Start: 2018-12-11 | End: 2018-12-11 | Stop reason: HOSPADM

## 2018-12-11 RX ORDER — NALOXONE HYDROCHLORIDE 0.4 MG/ML
0.04 INJECTION, SOLUTION INTRAMUSCULAR; INTRAVENOUS; SUBCUTANEOUS
Status: DISCONTINUED | OUTPATIENT
Start: 2018-12-11 | End: 2018-12-11 | Stop reason: HOSPADM

## 2018-12-11 RX ORDER — CEFAZOLIN SODIUM/WATER 2 G/20 ML
2 SYRINGE (ML) INTRAVENOUS
Status: DISCONTINUED | OUTPATIENT
Start: 2018-12-11 | End: 2018-12-11

## 2018-12-11 RX ORDER — LIDOCAINE HYDROCHLORIDE 10 MG/ML
0.1 INJECTION INFILTRATION; PERINEURAL AS NEEDED
Status: DISCONTINUED | OUTPATIENT
Start: 2018-12-11 | End: 2018-12-11 | Stop reason: HOSPADM

## 2018-12-11 RX ORDER — FENTANYL CITRATE 50 UG/ML
100 INJECTION, SOLUTION INTRAMUSCULAR; INTRAVENOUS ONCE
Status: DISCONTINUED | OUTPATIENT
Start: 2018-12-11 | End: 2018-12-11 | Stop reason: HOSPADM

## 2018-12-11 RX ORDER — OXYCODONE HYDROCHLORIDE 5 MG/1
5 TABLET ORAL
Status: DISCONTINUED | OUTPATIENT
Start: 2018-12-11 | End: 2018-12-11 | Stop reason: HOSPADM

## 2018-12-11 RX ORDER — LIDOCAINE HYDROCHLORIDE 20 MG/ML
INJECTION, SOLUTION EPIDURAL; INFILTRATION; INTRACAUDAL; PERINEURAL AS NEEDED
Status: DISCONTINUED | OUTPATIENT
Start: 2018-12-11 | End: 2018-12-11 | Stop reason: HOSPADM

## 2018-12-11 RX ORDER — DEXAMETHASONE SODIUM PHOSPHATE 4 MG/ML
INJECTION, SOLUTION INTRA-ARTICULAR; INTRALESIONAL; INTRAMUSCULAR; INTRAVENOUS; SOFT TISSUE AS NEEDED
Status: DISCONTINUED | OUTPATIENT
Start: 2018-12-11 | End: 2018-12-11 | Stop reason: HOSPADM

## 2018-12-11 RX ORDER — BUPIVACAINE HYDROCHLORIDE 5 MG/ML
INJECTION, SOLUTION EPIDURAL; INTRACAUDAL AS NEEDED
Status: DISCONTINUED | OUTPATIENT
Start: 2018-12-11 | End: 2018-12-11 | Stop reason: HOSPADM

## 2018-12-11 RX ADMIN — KETOROLAC TROMETHAMINE 30 MG: 30 INJECTION, SOLUTION INTRAMUSCULAR; INTRAVENOUS at 11:15

## 2018-12-11 RX ADMIN — EPHEDRINE SULFATE 5 MG: 50 INJECTION, SOLUTION INTRAVENOUS at 08:56

## 2018-12-11 RX ADMIN — CEFTRIAXONE SODIUM 2 G: 2 INJECTION, POWDER, FOR SOLUTION INTRAMUSCULAR; INTRAVENOUS at 07:54

## 2018-12-11 RX ADMIN — ONDANSETRON 4 MG: 2 INJECTION INTRAMUSCULAR; INTRAVENOUS at 11:15

## 2018-12-11 RX ADMIN — SODIUM CHLORIDE, SODIUM LACTATE, POTASSIUM CHLORIDE, AND CALCIUM CHLORIDE: 600; 310; 30; 20 INJECTION, SOLUTION INTRAVENOUS at 09:25

## 2018-12-11 RX ADMIN — NEOSTIGMINE METHYLSULFATE 3 MG: 1 INJECTION INTRAVENOUS at 11:22

## 2018-12-11 RX ADMIN — SODIUM CHLORIDE, SODIUM LACTATE, POTASSIUM CHLORIDE, AND CALCIUM CHLORIDE 100 ML/HR: 600; 310; 30; 20 INJECTION, SOLUTION INTRAVENOUS at 07:30

## 2018-12-11 RX ADMIN — DEXAMETHASONE SODIUM PHOSPHATE 4 MG: 4 INJECTION, SOLUTION INTRA-ARTICULAR; INTRALESIONAL; INTRAMUSCULAR; INTRAVENOUS; SOFT TISSUE at 11:15

## 2018-12-11 RX ADMIN — EPHEDRINE SULFATE 5 MG: 50 INJECTION, SOLUTION INTRAVENOUS at 08:47

## 2018-12-11 RX ADMIN — ROCURONIUM BROMIDE 30 MG: 10 INJECTION, SOLUTION INTRAVENOUS at 08:18

## 2018-12-11 RX ADMIN — ROCURONIUM BROMIDE 5 MG: 10 INJECTION, SOLUTION INTRAVENOUS at 08:59

## 2018-12-11 RX ADMIN — ROCURONIUM BROMIDE 5 MG: 10 INJECTION, SOLUTION INTRAVENOUS at 09:30

## 2018-12-11 RX ADMIN — ROCURONIUM BROMIDE 15 MG: 10 INJECTION, SOLUTION INTRAVENOUS at 10:33

## 2018-12-11 RX ADMIN — LIDOCAINE HYDROCHLORIDE 60 MG: 20 INJECTION, SOLUTION EPIDURAL; INFILTRATION; INTRACAUDAL; PERINEURAL at 08:18

## 2018-12-11 RX ADMIN — FENTANYL CITRATE 100 MCG: 50 INJECTION, SOLUTION INTRAMUSCULAR; INTRAVENOUS at 08:18

## 2018-12-11 RX ADMIN — EPHEDRINE SULFATE 5 MG: 50 INJECTION, SOLUTION INTRAVENOUS at 08:58

## 2018-12-11 RX ADMIN — GENTAMICIN SULFATE 160 MG: 40 INJECTION, SOLUTION INTRAMUSCULAR; INTRAVENOUS at 08:18

## 2018-12-11 RX ADMIN — ROCURONIUM BROMIDE 10 MG: 10 INJECTION, SOLUTION INTRAVENOUS at 08:52

## 2018-12-11 RX ADMIN — PROPOFOL 200 MG: 10 INJECTION, EMULSION INTRAVENOUS at 08:18

## 2018-12-11 RX ADMIN — ROCURONIUM BROMIDE 10 MG: 10 INJECTION, SOLUTION INTRAVENOUS at 09:55

## 2018-12-11 RX ADMIN — GLYCOPYRROLATE 0.4 MG: 0.2 INJECTION INTRAMUSCULAR; INTRAVENOUS at 11:22

## 2018-12-11 NOTE — OP NOTES
[facility] REPORT    Veronica Baron  MR#: [id number]  : 1945  ACCOUNT #: [de-identified]   DATE OF SERVICE: 2018    PREOPERATIVE DIAGNOSIS:  Recurrent umbilical hernia. POSTOPERATIVE DIAGNOSIS:  Recurrent umbilical hernia, plus recurrent incisional hernia. PROCEDURE PERFORMED:  Repair of incisional and umbilical hernia with mesh. SURGEON:  Hoda Howard. Kimo Ma MD    ASSISTANT:  0    ANESTHESIA:  General.    COMPLICATIONS:  None. IMPLANTS:  0    ESTIMATED BLOOD LOSS:  100 ml    SPECIMENS REMOVED:   0    DESCRIPTION:  With the patient in the supine position, a sterile field was prepared. A curvilinear incision was made 1.5 inches below the umbilicus. Dissection was carried down to the fascia. The dissection was then carried up to the umbilical hernia. The umbilical hernia was opened to the peritoneal cavity. The entire umbilicus was freed from the underlying fascia. The index finger was passed into the umbilical opening and the patient was found to have a recurrent hernia in the incisional portion of the repair above the umbilicus. The defects were then carefully dissected free from surrounding tissue. There was extensive scar tissue present at the site of the umbilical hernia repair and the incisional hernia repair. Once this was finally dissected free, the thick scar was removed and the edges of the fascia freed from surrounding tissue. Adhesions between the abdominal contents and the hernia peritoneum were dissected free. Both hernia sacs were then removed. Once this was completed, the fascial edges on the incisional portion of the hernia were reapproximated with interrupted stitches of 0 Prolene suture. These stitches were placed approximately 0.25-0.50 cm apart. Once this portion of the fascia had been closed very well, no defect was palpable.   Following this, the fascial edges around the umbilical hernia defect were then freed from surrounding tissue. A mesh was then fashioned to cover the defect and this mesh was sutured flat over the defect with interrupted stitches of 2-0 Prolene. Eight sutures were placed positioning the mesh against the fascia. Once this was completed, subcutaneous closure was carried out with interrupted stitches of 3-0 Vicryl. Subcuticular closure was then carried out with a 3-0 Vicryl. Prior to closure of the fascia, the peritoneum was closed preventing the contact of the peritoneal contents with the mesh itself. A sterile dressing was applied. The procedure was terminated without complication.       MD Basim Davis / Olamide Peres  D: 12/11/2018 12:07     T: 12/11/2018 12:30  JOB #: [id number]

## 2018-12-11 NOTE — H&P
[facility] HISTORY AND PHYSICAL Name:DONNA OLSEN SR. 
MR#: [id number] : 1945 ACCOUNT #: [de-identified] ADMIT DATE: 2018 HISTORY OF PRESENT ILLNESS:  A 78-year-old black male with a recurrent incisional hernia at the site of a previous umbilical hernia repair. Patient is having pain in this area and the hernia is enlarging. Patient also has a history of prostate enlargement with lower urinary tract symptoms. He has associated elevated PSA. He has no other evidence of prostate cancer. ALLERGIES:  NO KNOWN DRUG ALLERGIES. MEDICATIONS:  Lisinopril 20 mg 1 daily, metoprolol 25 mg 1 b.i.d., pravastatin 40 mg 1 daily, doxazosin 8 mg 1 b.i.d., omeprazole 20 mg 1 b.i.d. ADDITIONAL PAST MEDICAL HISTORY, FAMILY HISTORY, REVIEW OF SYSTEMS:  See patient completed questionnaire which was reviewed with the patient. PHYSICAL EXAMINATION: 
VITAL SIGNS:  Blood pressure 140/80, weight 202, pulse 64. GENERAL:  Well-developed, well-nourished black male in no acute distress. NEUROPSYCHIATRIC: Oriented x3, no obvious neurologic deficit. HEENT:  Normal. 
NECK:  Supple. Thyroid not palpable. No carotid bruits. CHEST:  Good chest cage and diaphragmatic excursions. RESPIRATORY:  Breath sounds equal bilaterally without wheezes, rhonchi or rales. HEART:  Regular rhythm without significant murmur, gallop or rub. ABDOMEN:  Patient has an incisional umbilical hernia at the site of a previous umbilical hernia repair. The hernia is easily reducible. GENITALIA:  Normal uncircumcised male. Scrotum and scrotal contents are unremarkable. RECTAL:  Sphincter tone and anal canal retrograde was unremarkable. The prostate was estimated at 40-50 grams and is non-nodular. EXTREMITIES:  Full range of motion of all extremities. Muscle mass, muscle strength, reflexes and pulses equal bilaterally in upper and lower extremities. IMPRESSION:  Recurrent umbilical incisional hernia. PLAN:  Patient brought in at this time for repair. MD BAM Chavis/LAWANDA 
D: 12/10/2018 20:48    
T: 12/10/2018 21:09 
JOB #: [id number]

## 2018-12-11 NOTE — ANESTHESIA PREPROCEDURE EVALUATION
Anesthetic History No history of anesthetic complications Review of Systems / Medical History Pertinent labs reviewed Pulmonary Within defined limits Neuro/Psych Within defined limits Cardiovascular Hypertension Dysrhythmias (s/p ablation 2015) : atrial flutter Past MI (2015), CAD and CABG (2015) Exercise tolerance: >4 METS Comments: Normal stress test 6/17. GI/Hepatic/Renal 
  
GERD: well controlled Endo/Other Obesity and arthritis Other Findings Physical Exam 
 
Airway Mallampati: I 
TM Distance: 4 - 6 cm Neck ROM: normal range of motion Mouth opening: Normal 
 
 Cardiovascular Regular rate and rhythm,  S1 and S2 normal,  no murmur, click, rub, or gallop Dental 
 
Dentition: Full lower dentures and Full upper dentures Pulmonary Breath sounds clear to auscultation Abdominal 
GI exam deferred Other Findings Anesthetic Plan ASA: 3 Anesthesia type: general 
 
 
 
 
Induction: Intravenous Anesthetic plan and risks discussed with: Patient

## 2018-12-11 NOTE — DISCHARGE INSTRUCTIONS
HERNIA REPAIR    ACTIVITY  · As tolerated and as directed by your doctor. shower as directed by your doctor. NO tub baths  · Avoid lifting more than 15 pounds (pulling and straining). Avoid excessive use of stairs. · Take deep breathes and support incision with pillow when you cough. DIET  · Clear liquids until no nausea or vomiting; then light diet for the first day. · Advance to regular diet on second day, unless directed by your doctor. · If nausea or vomiting continues, call your doctor. DRESSING CARE as directed by your doctor  Ice pack 20 to 30 minutes at a time as needed   CALL YOUR DOCTOR IF   · Excessive bleeding that does not stop after holding pressure over the area. · Temperature of 101 degrees F or above. · Redness, excessive swelling or bruising, and/ or green or yellow, smelly discharge from incision. AFTER ANESTHESIA  · For the first 24 hours: DO NOT drive, drink alcoholic beverages, or make important decisions. · Be aware of dizziness following anesthesia and while taking pain medication. APPOINTMENT DATE/ TIME  Call for a 2 week appointment    YOUR DOCTOR'S PHONE NUMBER [phone]    DISCHARGE SUMMARY from Nurse    PATIENT INSTRUCTIONS:    After general anesthesia or intravenous sedation, for 24 hours or while taking prescription Narcotics:  · Limit your activities  · Do not drive and operate hazardous machinery  · Do not make important personal or business decisions  · Do  not drink alcoholic beverages  · If you have not urinated within 8 hours after discharge, please contact your surgeon on call. *  Please give a list of your current medications to your Primary Care Provider. *  Please update this list whenever your medications are discontinued, doses are      changed, or new medications (including over-the-counter products) are added. *  Please carry medication information at all times in case of emergency situations.       These are general instructions for a healthy lifestyle:    No smoking/ No tobacco products/ Avoid exposure to second hand smoke    Surgeon General's Warning:  Quitting smoking now greatly reduces serious risk to your health. Obesity, smoking, and sedentary lifestyle greatly increases your risk for illness    A healthy diet, regular physical exercise & weight monitoring are important for maintaining a healthy lifestyle    You may be retaining fluid if you have a history of heart failure or if you experience any of the following symptoms:  Weight gain of 3 pounds or more overnight or 5 pounds in a week, increased swelling in our hands or feet or shortness of breath while lying flat in bed. Please call your doctor as soon as you notice any of these symptoms; do not wait until your next office visit. Recognize signs and symptoms of STROKE:    F-face looks uneven    A-arms unable to move or move unevenly    S-speech slurred or non-existent    T-time-call 911 as soon as signs and symptoms begin-DO NOT go       Back to bed or wait to see if you get better-TIME IS BRAIN.

## 2018-12-11 NOTE — ANESTHESIA POSTPROCEDURE EVALUATION
Procedure(s): HERNIA UMBILICAL REPAIR.     Anesthesia Post Evaluation        Patient location during evaluation: PACU  Patient participation: complete - patient participated  Level of consciousness: awake  Pain management: satisfactory to patient  Airway patency: patent  Anesthetic complications: no  Cardiovascular status: hemodynamically stable  Respiratory status: spontaneous ventilation  Hydration status: euvolemic  Post anesthesia nausea and vomiting:  none      Visit Vitals  /69   Pulse (!) 59   Temp 36.4 °C (97.6 °F)   Resp 16   Wt 92.1 kg (203 lb)   SpO2 93%   BMI 29.98 kg/m²

## 2018-12-11 NOTE — H&P
Johanna Nolen [facility] HISTORY AND PHYSICAL Name:DONNA OLSEN SR. 
MR#: [id number] : 1945 ACCOUNT #: [de-identified] ADMIT DATE: 2018 HISTORY OF PRESENT ILLNESS:  A 61-year-old black male with a recurrent incisional hernia at the site of a previous umbilical hernia repair. Patient is having pain in this area and the hernia is enlarging. Patient also has a history of prostate enlargement with lower urinary tract symptoms. He has associated elevated PSA. He has no other evidence of prostate cancer. ALLERGIES:  NO KNOWN DRUG ALLERGIES. MEDICATIONS:  Lisinopril 20 mg 1 daily, metoprolol 25 mg 1 b.i.d., pravastatin 40 mg 1 daily, doxazosin 8 mg 1 b.i.d., omeprazole 20 mg 1 b.i.d. ADDITIONAL PAST MEDICAL HISTORY, FAMILY HISTORY, REVIEW OF SYSTEMS:  See patient completed questionnaire which was reviewed with the patient. PHYSICAL EXAMINATION: 
VITAL SIGNS:  Blood pressure 140/80, weight 202, pulse 64. GENERAL:  Well-developed, well-nourished black male in no acute distress. NEUROPSYCHIATRIC: Oriented x3, no obvious neurologic deficit. HEENT:  Normal. 
NECK:  Supple. Thyroid not palpable. No carotid bruits. CHEST:  Good chest cage and diaphragmatic excursions. RESPIRATORY:  Breath sounds equal bilaterally without wheezes, rhonchi or rales. HEART:  Regular rhythm without significant murmur, gallop or rub. ABDOMEN:  Patient has an incisional umbilical hernia at the site of a previous umbilical hernia repair. The hernia is easily reducible. GENITALIA:  Normal uncircumcised male. Scrotum and scrotal contents are unremarkable. RECTAL:  Sphincter tone and anal canal retrograde was unremarkable. The prostate was estimated at 40-50 grams and is non-nodular. EXTREMITIES:  Full range of motion of all extremities. Muscle mass, muscle strength, reflexes and pulses equal bilaterally in upper and lower extremities. IMPRESSION:  Recurrent umbilical incisional hernia. PLAN:  Patient brought in at this time for repair. MD BAM Costello/LAWANDA 
D: 12/10/2018 20:48    
T: 12/10/2018 21:09 
JOB #: [id number]

## 2018-12-11 NOTE — BRIEF OP NOTE
BRIEF OPERATIVE NOTE    Date of Procedure: 12/11/2018   Preoperative Diagnosis: Umbilical hernia without obstruction and without gangrene [K42.9]  Postoperative Diagnosis: Umbilical hernia without obstruction and without gangrene [K42.9]    Procedure(s): HERNIA UMBILICAL REPAIR  Surgeon(s) and Role:     * Fady Bui MD - Primary         Surgical Assistant: 0    Surgical Staff:  Circ-1: Emre Hernandez RN  Scrub Tech-1: Ronnell Werner  Event Time In Time Out   Incision Start 6202    Incision Close 1134      Anesthesia: General   Estimated Blood Loss: 100 ml  Specimens:   ID Type Source Tests Collected by Time Destination   1 : Umbilical hernia sac  Tissue  Fady Bui MD 12/11/2018 1035 Discarded      Findings: recurrent incisional and umbilical hernia   Complications: none  Implants:   Implant Name Type Inv.  Item Serial No.  Lot No. LRB No. Used Action   MESH [facility]) FLAT SHT MARLX [id number] --  - [id number] Mesh MESH ENOCH FLAT SHT MARLX [id number] --   BARD KATIE [id number] N/A 1 Implanted

## 2019-06-05 ENCOUNTER — HOSPITAL ENCOUNTER (OUTPATIENT)
Dept: LAB | Age: 74
Discharge: HOME | End: 2019-06-05
Attending: INTERNAL MEDICINE
Payer: MEDICARE

## 2019-06-05 DIAGNOSIS — E78.5 DYSLIPIDEMIA: ICD-10-CM

## 2019-06-05 LAB
CHOLEST SERPL-MCNC: 168 MG/DL
HDLC SERPL-MCNC: 69 MG/DL (ref 40–60)
HDLC SERPL: 2.4 {RATIO}
LDLC SERPL CALC-MCNC: 90 MG/DL
LIPID PROFILE,FLP: ABNORMAL
TRIGL SERPL-MCNC: 45 MG/DL (ref 35–150)
VLDLC SERPL CALC-MCNC: 9 MG/DL (ref 6–23)

## 2019-06-05 PROCEDURE — 36415 COLL VENOUS BLD VENIPUNCTURE: CPT

## 2019-06-05 PROCEDURE — 80061 LIPID PANEL: CPT

## 2019-08-29 ENCOUNTER — HOSPITAL ENCOUNTER (OUTPATIENT)
Dept: ULTRASOUND IMAGING | Age: 74
Discharge: HOME | End: 2019-08-29
Attending: FAMILY MEDICINE

## 2019-08-29 DIAGNOSIS — K42.9 UMBILICAL HERNIA WITHOUT OBSTRUCTION AND WITHOUT GANGRENE: ICD-10-CM

## 2019-09-19 ENCOUNTER — HOSPITAL ENCOUNTER (OUTPATIENT)
Dept: CT IMAGING | Age: 74
Discharge: HOME | End: 2019-09-19
Attending: FAMILY MEDICINE
Payer: MEDICARE

## 2019-09-19 DIAGNOSIS — R16.0 LIVER MASS: ICD-10-CM

## 2019-09-19 LAB — CREAT BLD-MCNC: 0.7 MG/DL (ref 0.8–1.5)

## 2019-09-19 PROCEDURE — 82565 ASSAY OF CREATININE: CPT

## 2019-09-19 PROCEDURE — 74160 CT ABDOMEN W/CONTRAST: CPT

## 2019-09-19 PROCEDURE — 74011636320 HC RX REV CODE- 636/320: Performed by: FAMILY MEDICINE

## 2019-09-19 PROCEDURE — 74011000258 HC RX REV CODE- 258: Performed by: FAMILY MEDICINE

## 2019-09-19 RX ORDER — SODIUM CHLORIDE 0.9 % (FLUSH) 0.9 %
10 SYRINGE (ML) INJECTION
Status: COMPLETED | OUTPATIENT
Start: 2019-09-19 | End: 2019-09-19

## 2019-09-19 RX ADMIN — Medication 10 ML: at 09:15

## 2019-09-19 RX ADMIN — SODIUM CHLORIDE 100 ML: 900 INJECTION, SOLUTION INTRAVENOUS at 09:15

## 2019-09-19 RX ADMIN — IOPAMIDOL 100 ML: 755 INJECTION, SOLUTION INTRAVENOUS at 09:15

## 2019-09-19 RX ADMIN — DIATRIZOATE MEGLUMINE AND DIATRIZOATE SODIUM 15 ML: 660; 100 LIQUID ORAL; RECTAL at 09:15

## 2019-09-21 ENCOUNTER — HOSPITAL ENCOUNTER (OUTPATIENT)
Dept: MRI IMAGING | Age: 74
Discharge: HOME | End: 2019-09-21
Attending: FAMILY MEDICINE
Payer: MEDICARE

## 2019-09-21 DIAGNOSIS — E27.8 ADRENAL MASS (HCC): ICD-10-CM

## 2019-09-21 PROCEDURE — A9575 INJ GADOTERATE MEGLUMI 0.1ML: HCPCS | Performed by: FAMILY MEDICINE

## 2019-09-21 PROCEDURE — 74183 MRI ABD W/O CNTR FLWD CNTR: CPT

## 2019-09-21 PROCEDURE — 74011250636 HC RX REV CODE- 250/636: Performed by: FAMILY MEDICINE

## 2019-09-21 PROCEDURE — 74011000258 HC RX REV CODE- 258: Performed by: FAMILY MEDICINE

## 2019-09-21 RX ORDER — SODIUM CHLORIDE 0.9 % (FLUSH) 0.9 %
10 SYRINGE (ML) INJECTION
Status: COMPLETED | OUTPATIENT
Start: 2019-09-21 | End: 2019-09-21

## 2019-09-21 RX ORDER — GADOTERATE MEGLUMINE 376.9 MG/ML
18 INJECTION INTRAVENOUS
Status: COMPLETED | OUTPATIENT
Start: 2019-09-21 | End: 2019-09-21

## 2019-09-21 RX ADMIN — Medication 10 ML: at 12:36

## 2019-09-21 RX ADMIN — GADOTERATE MEGLUMINE 18 ML: 376.9 INJECTION INTRAVENOUS at 12:37

## 2019-09-21 RX ADMIN — SODIUM CHLORIDE 100 ML: 900 INJECTION, SOLUTION INTRAVENOUS at 12:36

## 2019-11-14 NOTE — H&P (VIEW-ONLY)
[facility] [address] [facility], [address]  
[phone] Dilan Adler Sr. 
: 1945 Chief Complaint Patient presents with  New Patient Elevated PSA HPI Dilan Adler Sr. is a 69 y.o. male referred by Dr. Amanda Hatch in regards to need for TRUS guided prostate biopsy in an operative setting. Patient has a + family history of ACP with his brother being diagnosed in his mid 63's. Patient has mild to moderate LUTS. He has CAD s/p CABG and is on 81 mg aspirin. He takes cardura 8 mg daily. He has undergone 1 previous prostate biopsy by Dr. Adrianna Gonzales and had a BAD experience with a lot of pain during the procedure. Patient can think of no other instigating or exacerbating events. PSA: 10/15 6.0,  5.5, 10/16 7.8,  9.3,  11.38,  16.2,  21.3,  21.5 Past Medical History:  
Diagnosis Date  Arrhythmia 2015 at [facility] A-Flutter ablation--- dr Nura Rodrigues  Aspirin long-term use  CAD (coronary artery disease) mi--2015 cabg 2015---- No stents  CAD in native artery 2015 2/19/15 (Dr Poonam Pena) Coronary artery bypass grafting x2 with grafts consisting of bilateral mammaries  Elevated hemidiaphragm 2015 barium swallow on 2/12/15 that revealed an unremarkable esophagus and paralyzed left diaphragm with an unusual rotation of the stomach in the left upper quadrant  esophagram 2015 which revealed an abnormal contour the left hemidiaphragm, suggesting diaphragmatic hernia.  Barium Swallow Barium swallow today to assess for any signs of possible torsion. If no torsion present, will likely recommend outpatient surgical consult and continued follow up with Dr. Maria Luisa Granado in [place].  Elevated hemoglobin A1c 2015  
 6.1 - 2/17/15  Full dentures 2015  GERD (gastroesophageal reflux disease)   
 controlled with med  Hernia of abdominal cavity 2015  History of atrial flutter 8/2015  
 ablation  History of complete eye exam 01/01/2015 [facility]  
 Hypercholesterolemia  Hyperlipidemia  Hyperlipidemia 2/18/2015  Hypertension   
 controlled with med  Hypoxemia 2/21/2015  MI, old 2/2015  
 NSTEMI (non-ST elevated myocardial infarction) ([facility] [address] 2/14/2015  OA (osteoarthritis) 2/21/2015  Pleural effusion on right 2/21/2015  Postoperative anemia due to acute blood loss 2/19/2015  S/P CABG x 2 2/2015  Thrombocytopenia due to extra corporeal by-pass circulation 2/19/2015 Patient denies  Varicose veins of lower extremities with other complications 7/10/1467  
 7/24/15 (Dr Bobby Ramsey) L [facility] RFA ablation 5/15/14 (Dr. Bobby Ramsey) R [facility] Radiofrequency ablation  Wears dentures Past Surgical History:  
Procedure Laterality Date  CABG, ARTERY-VEIN, TWO  2015 Dr. Maryfrances Snellen  COLONOSCOPY N/A 3/9/2018 COLONOSCOPY/ 28 performed by Maricel [facility]cheverria MD at [facility] ENDOSCOPY  
 HX COLONOSCOPY  2012  HX CORONARY ARTERY BYPASS GRAFT  2/15 DR. Delma Lawton  HX FRACTURE TX Right 2007  
 elbow fx  HX HEART CATHETERIZATION  2/2015 and 8/15  HX HEART CATHETERIZATION  8/2015 [id number]  HX HERNIA REPAIR  1702  
 umbilical  
 HX HERNIA REPAIR Left unsure of 2nd surgery [facility] and redo embilical, Both Repairs at same time  HX KNEE ARTHROSCOPY Left 2006  
 left knee  HX ORTHOPAEDIC Right 2007  
 right elbow fx  HX VEIN STRIPPING    
 NM COLSC FLX W/REMOVAL LESION BY HOT BX FORCEPS  3/9/2018 Current Outpatient Medications Medication Sig Dispense Refill  ciprofloxacin HCl (CIPRO) 500 mg tablet Take 1 Tab by mouth two (2) times a day for 1 day. Begin AM of prostate biopsy 2 Tab 0  
 doxazosin (CARDURA) 8 mg tablet Take 1 Tab by mouth two (2) times a day.  180 Tab 3  
 omeprazole (PRILOSEC) 20 mg capsule TAKE 1 CAPSULE TWO TIMES A DAY FOR GASTROESOPHAGEAL REFLUX 180 Cap 3  
  metoprolol tartrate (LOPRESSOR) 25 mg tablet TAKE 1 TABLET TWICE DAILY FOR HYPERTENSION 180 Tab 3  
 lisinopril (PRINIVIL, ZESTRIL) 20 mg tablet TAKE 1 TABLET TWICE DAILY FOR HYPERTENSION 180 Tab 3  pravastatin (PRAVACHOL) 80 mg tablet Take 1 Tab by mouth nightly. 90 Tab 3  
 aspirin delayed-release 81 mg tablet Take  by mouth daily.  Ca-D3-mag ox-zinc--perlita-bor (CALCIUM 600-D3 PLUS) 600 mg calcium- 800 unit-50 mg tab Take  by mouth.  OTHER,NON-FORMULARY, Libido-Max   Takes 2 in AM and 2 tabs at HS    
 OTHER,NON-FORMULARY, Urinozinc Prostate   Takes 2 tabs daily  ferrous sulfate 325 mg (65 mg iron) tablet Take 325 mg by mouth Daily (before breakfast).  Fish Oil-Omega-3 Fatty Acids (FISH OIL OMEGA 3-6-9) 300-1,000 mg cpDR Take 2 Tabs by mouth every morning. Indications: last dose 3/7/18  MULTIVITS-MINERALS/FA/LYCOPENE (ONE-A-DAY MEN'S PO) Take 1 Tab by mouth daily. Indications: last dose 3/7/18 No Known Allergies Social History Socioeconomic History  Marital status: SINGLE Spouse name: Not on file  Number of children: Not on file  Years of education: Not on file  Highest education level: Not on file Occupational History  Not on file Social Needs  Financial resource strain: Not on file  Food insecurity:  
  Worry: Not on file Inability: Not on file  Transportation needs:  
  Medical: Not on file Non-medical: Not on file Tobacco Use  Smoking status: Former Smoker Packs/day: 0.25 Years: 40.00 Pack years: 10.00 Last attempt to quit: 2001 Years since quittin.5  Smokeless tobacco: Never Used Substance and Sexual Activity  Alcohol use: No  
 Drug use: No  
 Sexual activity: Not on file Lifestyle  Physical activity:  
  Days per week: Not on file Minutes per session: Not on file  Stress: Not on file Relationships  Social connections:  
  Talks on phone: Not on file Gets together: Not on file Attends [employer] service: Not on file Active member of club or organization: Not on file Attends meetings of clubs or organizations: Not on file Relationship status: Not on file  Intimate partner violence:  
  Fear of current or ex partner: Not on file Emotionally abused: Not on file Physically abused: Not on file Forced sexual activity: Not on file Other Topics Concern  Not on file Social History Narrative  Not on file Family History Problem Relation Age of Onset  Heart Disease Brother  Hypertension Brother  Heart Disease Father  Hypertension Sister  Hypertension Brother  No Known Problems Mother Review of Systems Constitutional:   Negative for fever, chills and headaches. Skin: Negative skin ROS Eyes: Eyes negative Respiratory: Positive for shortness of breath. Negative for cough and wheezing. Cardiovascular: Positive for hypertension, irregular heartbeat and varicose veins. Negative for chest pain. GI: Positive for constipation. Negative for nausea, vomiting, abdominal pain, indigestion and heartburn. Genitourinary: Positive for nocturia, urgency, leakage w/ urge, frequent urination and incomplete emptying. Negative for erectile dysfunction, sexually transmitted disease and discharge. Musculoskeletal:  Negative for back pain, arthralgias and neck pain. Neurological:  Negative for dizziness, numbness and tremors. Psychological:  Negative for depression. Endocrine: Positive for excessive urination. Negative for cold intolerance, thirst, fatigue and heat intolerance. Urinalysis UA - Dipstick Results for orders placed or performed in visit on 11/14/19 AMB POC URINALYSIS DIP STICK AUTO W/O MICRO (PGU)     Status: None Result Value Ref Range Status  Glucose (UA POC) Negative Negative mg/dL Final  
 Bilirubin (UA POC) Negative Negative Final  
 Ketones (UA POC) Negative Negative Final  
 Specific gravity (UA POC) 1.015 1.001 - 1.035 Final  
 Blood (UA POC) Trace Negative Final  
 pH (UA POC) 6 4.6 - 8.0 Final  
 Protein (UA POC) Negative Negative Final  
 Urobilinogen (POC) 0.2 mg/dL  Final  
 Nitrites (UA POC) Negative Negative Final  
 Leukocyte esterase (UA POC) Negative Negative Final  
 
 
Physical Exam: 
 
Visit Vitals /72 Pulse 62 Ht 5' 8.5\" (1.74 m) Wt 199 lb (90.3 kg) BMI 29.82 kg/m² GENERAL: alert, cooperative, no distress, EYE: anicteric sclera and conjunctivae and sclerae normal, NECK: no visible neck mass or adenopathy, LUNG: clear to auscultation bilaterally, HEART: regular rate and rhythm, ABDOMEN: soft, non-tender. Bowel sounds normal. No masses,  no organomegaly, EXTREMITIES:  extremities normal, atraumatic, no edema, SKIN: Normal and no visible rash, NEUROLOGIC: speech normal in context and clarity memory intact grossly, cranial nerves II-XII grossly intact, PSYCHIATRIC: alert and oriented, normal affect VI: deferred to time of biopsy in the OR Assessment and Plan ICD-10-CM ICD-9-CM 1. Elevated PSA R97.20 790.93 AMB POC URINALYSIS DIP STICK AUTO W/O MICRO (PGU)  
   ciprofloxacin HCl (CIPRO) 500 mg tablet We first discussed the physiology of psa. We also discussed potential causes of elevated psa including prostate cancer, inflammation, infection, BPH, and idiopathic elevations. Treatment options including rechecking psa vs. going forward with prostate biopsy were discussed. He will be scheduled for TRUS guided prostate biopsy in the OR. Potential risks were discussed. He will hold baby aspirin 5 days prior to biopsy.

## 2019-12-05 ENCOUNTER — ANESTHESIA EVENT (OUTPATIENT)
Dept: SURGERY | Age: 74
End: 2019-12-05
Payer: MEDICARE

## 2019-12-05 NOTE — ANESTHESIA PREPROCEDURE EVALUATION
Relevant Problems   No relevant active problems       Anesthetic History               Review of Systems / Medical History  Patient summary reviewed, nursing notes reviewed and pertinent labs reviewed    Pulmonary                   Neuro/Psych              Cardiovascular    Hypertension: well controlled        Dysrhythmias (s/p a-flutter ablation)   CAD (4 yrs s/p NSTEMI; CABG) and hyperlipidemia    Exercise tolerance: >4 METS  Comments: L hemidiaphragmatic paralysis   GI/Hepatic/Renal     GERD: well controlled           Endo/Other        Arthritis     Other Findings            Physical Exam    Airway  Mallampati: II  TM Distance: > 6 cm  Neck ROM: decreased range of motion   Mouth opening: Normal     Cardiovascular  Regular rate and rhythm,  S1 and S2 normal,  no murmur, click, rub, or gallop             Dental    Dentition: Full upper dentures and Full lower dentures     Pulmonary  Breath sounds clear to auscultation               Abdominal  GI exam deferred       Other Findings            Anesthetic Plan    ASA: 3  Anesthesia type: general            Anesthetic plan and risks discussed with: Patient

## 2019-12-06 ENCOUNTER — HOSPITAL ENCOUNTER (OUTPATIENT)
Age: 74
Setting detail: OUTPATIENT SURGERY
Discharge: HOME | End: 2019-12-06
Attending: UROLOGY | Admitting: UROLOGY
Payer: MEDICARE

## 2019-12-06 ENCOUNTER — ANESTHESIA (OUTPATIENT)
Dept: SURGERY | Age: 74
End: 2019-12-06
Payer: MEDICARE

## 2019-12-06 VITALS
RESPIRATION RATE: 16 BRPM | TEMPERATURE: 97.8 F | WEIGHT: 183.9 LBS | BODY MASS INDEX: 27.56 KG/M2 | DIASTOLIC BLOOD PRESSURE: 68 MMHG | SYSTOLIC BLOOD PRESSURE: 141 MMHG | HEART RATE: 61 BPM | OXYGEN SATURATION: 93 %

## 2019-12-06 LAB
ANION GAP SERPL CALC-SCNC: 6 MMOL/L (ref 7–16)
BUN SERPL-MCNC: 14 MG/DL (ref 8–23)
CALCIUM SERPL-MCNC: 9.1 MG/DL (ref 8.3–10.4)
CHLORIDE SERPL-SCNC: 101 MMOL/L (ref 98–107)
CO2 SERPL-SCNC: 30 MMOL/L (ref 21–32)
CREAT SERPL-MCNC: 0.79 MG/DL (ref 0.8–1.5)
ERYTHROCYTE [DISTWIDTH] IN BLOOD BY AUTOMATED COUNT: 13.6 % (ref 11.9–14.6)
GLUCOSE SERPL-MCNC: 84 MG/DL (ref 65–100)
HCT VFR BLD AUTO: 42.5 % (ref 41.1–50.3)
HGB BLD-MCNC: 13.5 G/DL (ref 13.6–17.2)
MCH RBC QN AUTO: 29.3 PG (ref 26.1–32.9)
MCHC RBC AUTO-ENTMCNC: 31.8 G/DL (ref 31.4–35)
MCV RBC AUTO: 92.2 FL (ref 79.6–97.8)
NRBC # BLD: 0 K/UL (ref 0–0.2)
PLATELET # BLD AUTO: 162 K/UL (ref 150–450)
PMV BLD AUTO: 9.1 FL (ref 9.4–12.3)
POTASSIUM SERPL-SCNC: 3.8 MMOL/L (ref 3.5–5.1)
RBC # BLD AUTO: 4.61 M/UL (ref 4.23–5.6)
SODIUM SERPL-SCNC: 137 MMOL/L (ref 136–145)
WBC # BLD AUTO: 4.4 K/UL (ref 4.3–11.1)

## 2019-12-06 PROCEDURE — 88305 TISSUE EXAM BY PATHOLOGIST: CPT

## 2019-12-06 PROCEDURE — 77030010509 HC AIRWY LMA MSK TELE -A: Performed by: NURSE ANESTHETIST, CERTIFIED REGISTERED

## 2019-12-06 PROCEDURE — 76010000154 HC OR TIME FIRST 0.5 HR: Performed by: UROLOGY

## 2019-12-06 PROCEDURE — 85027 COMPLETE CBC AUTOMATED: CPT

## 2019-12-06 PROCEDURE — 74011000250 HC RX REV CODE- 250: Performed by: NURSE ANESTHETIST, CERTIFIED REGISTERED

## 2019-12-06 PROCEDURE — 77030003481 HC NDL BIOP GUN BARD -B: Performed by: UROLOGY

## 2019-12-06 PROCEDURE — 74011250636 HC RX REV CODE- 250/636: Performed by: NURSE ANESTHETIST, CERTIFIED REGISTERED

## 2019-12-06 PROCEDURE — 76060000032 HC ANESTHESIA 0.5 TO 1 HR: Performed by: UROLOGY

## 2019-12-06 PROCEDURE — 74011250636 HC RX REV CODE- 250/636: Performed by: ANESTHESIOLOGY

## 2019-12-06 PROCEDURE — 76210000020 HC REC RM PH II FIRST 0.5 HR: Performed by: UROLOGY

## 2019-12-06 PROCEDURE — 74011250636 HC RX REV CODE- 250/636: Performed by: UROLOGY

## 2019-12-06 PROCEDURE — 76210000006 HC OR PH I REC 0.5 TO 1 HR: Performed by: UROLOGY

## 2019-12-06 PROCEDURE — 80048 BASIC METABOLIC PNL TOTAL CA: CPT

## 2019-12-06 PROCEDURE — 74011250637 HC RX REV CODE- 250/637: Performed by: ANESTHESIOLOGY

## 2019-12-06 RX ORDER — PROPOFOL 10 MG/ML
INJECTION, EMULSION INTRAVENOUS AS NEEDED
Status: DISCONTINUED | OUTPATIENT
Start: 2019-12-06 | End: 2019-12-06 | Stop reason: HOSPADM

## 2019-12-06 RX ORDER — GENTAMICIN SULFATE 40 MG/ML
80 INJECTION, SOLUTION INTRAMUSCULAR; INTRAVENOUS
Status: DISCONTINUED | OUTPATIENT
Start: 2019-12-06 | End: 2019-12-06 | Stop reason: SDUPTHER

## 2019-12-06 RX ORDER — SODIUM CHLORIDE, SODIUM LACTATE, POTASSIUM CHLORIDE, CALCIUM CHLORIDE 600; 310; 30; 20 MG/100ML; MG/100ML; MG/100ML; MG/100ML
125 INJECTION, SOLUTION INTRAVENOUS CONTINUOUS
Status: DISCONTINUED | OUTPATIENT
Start: 2019-12-06 | End: 2019-12-06 | Stop reason: HOSPADM

## 2019-12-06 RX ORDER — ONDANSETRON 2 MG/ML
INJECTION INTRAMUSCULAR; INTRAVENOUS AS NEEDED
Status: DISCONTINUED | OUTPATIENT
Start: 2019-12-06 | End: 2019-12-06 | Stop reason: HOSPADM

## 2019-12-06 RX ORDER — EPHEDRINE SULFATE/0.9% NACL/PF 50 MG/5 ML
SYRINGE (ML) INTRAVENOUS AS NEEDED
Status: DISCONTINUED | OUTPATIENT
Start: 2019-12-06 | End: 2019-12-06 | Stop reason: HOSPADM

## 2019-12-06 RX ORDER — SODIUM CHLORIDE, SODIUM LACTATE, POTASSIUM CHLORIDE, CALCIUM CHLORIDE 600; 310; 30; 20 MG/100ML; MG/100ML; MG/100ML; MG/100ML
125 INJECTION, SOLUTION INTRAVENOUS CONTINUOUS
Status: DISCONTINUED | OUTPATIENT
Start: 2019-12-06 | End: 2019-12-09 | Stop reason: HOSPADM

## 2019-12-06 RX ORDER — LIDOCAINE HYDROCHLORIDE 10 MG/ML
0.1 INJECTION INFILTRATION; PERINEURAL AS NEEDED
Status: DISCONTINUED | OUTPATIENT
Start: 2019-12-06 | End: 2019-12-06 | Stop reason: HOSPADM

## 2019-12-06 RX ORDER — OXYCODONE HYDROCHLORIDE 5 MG/1
5 TABLET ORAL
Status: DISCONTINUED | OUTPATIENT
Start: 2019-12-06 | End: 2019-12-07 | Stop reason: HOSPADM

## 2019-12-06 RX ORDER — FENTANYL CITRATE 50 UG/ML
INJECTION, SOLUTION INTRAMUSCULAR; INTRAVENOUS AS NEEDED
Status: DISCONTINUED | OUTPATIENT
Start: 2019-12-06 | End: 2019-12-06 | Stop reason: HOSPADM

## 2019-12-06 RX ORDER — LIDOCAINE HYDROCHLORIDE 20 MG/ML
INJECTION, SOLUTION EPIDURAL; INFILTRATION; INTRACAUDAL; PERINEURAL AS NEEDED
Status: DISCONTINUED | OUTPATIENT
Start: 2019-12-06 | End: 2019-12-06 | Stop reason: HOSPADM

## 2019-12-06 RX ORDER — ACETAMINOPHEN 500 MG
1000 TABLET ORAL ONCE
Status: COMPLETED | OUTPATIENT
Start: 2019-12-06 | End: 2019-12-06

## 2019-12-06 RX ORDER — GENTAMICIN SULFATE 80 MG/100ML
80 INJECTION, SOLUTION INTRAVENOUS ONCE
Status: COMPLETED | OUTPATIENT
Start: 2019-12-06 | End: 2019-12-06

## 2019-12-06 RX ORDER — NALOXONE HYDROCHLORIDE 0.4 MG/ML
0.1 INJECTION, SOLUTION INTRAMUSCULAR; INTRAVENOUS; SUBCUTANEOUS AS NEEDED
Status: DISCONTINUED | OUTPATIENT
Start: 2019-12-06 | End: 2019-12-09 | Stop reason: HOSPADM

## 2019-12-06 RX ADMIN — PROPOFOL 170 MG: 10 INJECTION, EMULSION INTRAVENOUS at 08:33

## 2019-12-06 RX ADMIN — PHENYLEPHRINE HYDROCHLORIDE 100 MCG: 10 INJECTION INTRAVENOUS at 08:44

## 2019-12-06 RX ADMIN — LIDOCAINE HYDROCHLORIDE 80 MG: 20 INJECTION, SOLUTION EPIDURAL; INFILTRATION; INTRACAUDAL; PERINEURAL at 08:33

## 2019-12-06 RX ADMIN — SODIUM CHLORIDE, SODIUM LACTATE, POTASSIUM CHLORIDE, AND CALCIUM CHLORIDE: 600; 310; 30; 20 INJECTION, SOLUTION INTRAVENOUS at 08:31

## 2019-12-06 RX ADMIN — Medication 10 MG: at 08:44

## 2019-12-06 RX ADMIN — PHENYLEPHRINE HYDROCHLORIDE 100 MCG: 10 INJECTION INTRAVENOUS at 08:40

## 2019-12-06 RX ADMIN — GENTAMICIN SULFATE 80 MG: 0.8 INJECTION, SOLUTION INTRAVENOUS at 08:40

## 2019-12-06 RX ADMIN — PHENYLEPHRINE HYDROCHLORIDE 50 MCG: 10 INJECTION INTRAVENOUS at 08:48

## 2019-12-06 RX ADMIN — ONDANSETRON 4 MG: 2 INJECTION INTRAMUSCULAR; INTRAVENOUS at 08:49

## 2019-12-06 RX ADMIN — FENTANYL CITRATE 50 MCG: 50 INJECTION INTRAMUSCULAR; INTRAVENOUS at 08:33

## 2019-12-06 RX ADMIN — PHENYLEPHRINE HYDROCHLORIDE 100 MCG: 10 INJECTION INTRAVENOUS at 08:53

## 2019-12-06 RX ADMIN — SODIUM CHLORIDE, SODIUM LACTATE, POTASSIUM CHLORIDE, AND CALCIUM CHLORIDE 125 ML/HR: 600; 310; 30; 20 INJECTION, SOLUTION INTRAVENOUS at 07:53

## 2019-12-06 RX ADMIN — Medication 10 MG: at 08:53

## 2019-12-06 RX ADMIN — ACETAMINOPHEN 1000 MG: 500 TABLET, FILM COATED ORAL at 07:53

## 2019-12-06 RX ADMIN — Medication 5 MG: at 08:42

## 2019-12-06 NOTE — BRIEF OP NOTE
BRIEF OPERATIVE NOTE    Date of Procedure: 12/6/2019   Preoperative Diagnosis: Elevated PSA [R97.20]  Postoperative Diagnosis: Elevated PSA [R97.20]    Procedure(s):  TRANSRECTAL ULTRASOUND GUIDED PROSTATE BIOPSY  Surgeon(s) and Role:     * Dorian Bradley MD - Primary         Surgical Assistant: none    Surgical Staff:  Circ-1: Alma Rosa Govea RN  Scrub Tech-1: Ralph Faustin CNA  Event Time In Time Out   Incision Start 0845    Incision Close 4787      Anesthesia: General   Estimated Blood Loss: minimal  Specimens:   ID Type Source Tests Collected by Time Destination   1 : RLB Preservative Prostate  Dorian Bradley MD 12/6/2019 0845 Pathology   2 : RB Preservative Prostate  Dorian Bradley MD 12/6/2019 0845 Pathology   3 : RLM Preservative Prostate  Dorian Bradley MD 12/6/2019 0845 Pathology   4 : RM Preservative Prostate  Dorian Bradley MD 12/6/2019 9480 Pathology   5 : RLA Preservative Hope Bradley MD 12/6/2019 8365 Pathology   6 : RA Preservative Prostate  Dorian Bradley MD 12/6/2019 7983 Pathology   7 : LLB Preservative Prostate  Dorian Bradley MD 12/6/2019 7950 Pathology   8 : LB Preservative Prostate  Dorian Bradley MD 12/6/2019 0848 Pathology   9 : LLM Preservative Prostate  Dorian Bradley MD 12/6/2019 9970 Pathology   10 : LM Preservative Hope Bradley MD 12/6/2019 9006 Pathology   11 : LLA Preservative Hope Bradley MD 12/6/2019 6024 Pathology   12 : LA Preservative Prostate  Dorian Bradley MD 12/6/2019 9441 Pathology      Findings: see dictation   Complications: none apparent  Implants: * No implants in log *

## 2019-12-06 NOTE — INTERVAL H&P NOTE
H&P Update: 
Renard Shearer Sr. was seen and examined. History and physical has been reviewed. The patient has been examined.  There have been no significant clinical changes since the completion of the originally dated History and Physical.

## 2019-12-06 NOTE — PERIOP NOTES
Medical Records and Patient Relations notified per pt request.      Medical records form given to rein Milton whom verbalized understanding of paperwork.

## 2019-12-06 NOTE — DISCHARGE INSTRUCTIONS
Activity:  1. Remain active and walk several times per day, gradually increasing time and distance. 2. Avoid strenuous activity or heavy lifting greater than 10 pounds (about a gallon of milk) for the first week. 3. You may walk and climb stairs without restrictions. 4. No driving while taking narcotic pain medication. You may ride in a car without restrictions. Frequent breaks are encouraged on long trips. 5. You may resume sexual relations two weeks after surgery. However, you should refrain from anoreceptive intercourse for six weeks. 6. You may return to work when you feel you are able. Your activity is mostly limited by your discomfort. Pain Management:  1. Pain is expected after anorectal surgery and can be severe around the time of bowel movements. Use pain medications as needed and keep stools soft. 2. You will be given a prescription for pain medication at the time of discharge. Take as instructed on the bottle. Pain pills take about 30 minutes to start working after they are swallowed, so take them before the pain becomes severe. 3. Many pain medications contain Tylenol (acetaminophen), and therefore Tylenol should not be taken in addition to the pain medication. You may take Ibuprofen 600 mg every 8 hours with food either in between narcotic pain medication or instead of it to help control your pain. 4. Avoid alcohol while taking narcotic pain medications  5. Many pain medications cannot be called in to a pharmacy. Watch the number of pills you have and let us know well before (2-3 days) you are due to run out. 6. Resume all home medications, unless specifically addressed by your physician. Special Instructions:  1. Call your doctor for any of the following problems:  a.  Fever greater than 101 degrees  b. Persistent nausea and/or vomiting for more than 24 hours, and an inability to keep down liquids  c. New or worsening pain, or pain uncontrolled by pain medications. d. Spreading redness around the wound  e. Inability to urinate, or difficulty urinating. Pain and surgery can make it hard to void. Sometimes sitting in a warm tube may help to get started. f. Passage of large amount of red blood in bowel movements, or passage of large clots (some bleeding is normal, and you may see blood on the paper or in the water, but concerning bleeding would be 3-4 bloody bowel movements within a few hour period)  g. Opening up of wound, or large amount of drainage from the wound  h. Large amount of loose stool, with concerns for dehydration. Your stools will be looser or more frequent than preoperatively, and this is normal.  i. Inability to move your bowels after 3-4 days  2. Call the office with any other questions about your surgery. After general anesthesia or intravenous sedation, for 24 hours or while taking prescription Narcotics:  · Limit your activities  · A responsible adult needs to be with you for the next 24 hours  · Do not drive and operate hazardous machinery  · Do not make important personal or business decisions  · Do  not drink alcoholic beverages  · If you have not urinated within 8 hours after discharge, please contact your surgeon on call. · If you have sleep apnea and have a CPAP machine, please use it for all naps and sleeping. · Please use caution when taking narcotics and any of your home medications that may cause drowsiness. *  Please give a list of your current medications to your Primary Care Provider. *  Please update this list whenever your medications are discontinued, doses are      changed, or new medications (including over-the-counter products) are added. *  Please carry medication information at all times in case of emergency situations.     These are general instructions for a healthy lifestyle:  No smoking/ No tobacco products/ Avoid exposure to second hand smoke  Surgeon General's Warning:  Quitting smoking now greatly reduces serious risk to your health. Obesity, smoking, and sedentary lifestyle greatly increases your risk for illness  A healthy diet, regular physical exercise & weight monitoring are important for maintaining a healthy lifestyle    You may be retaining fluid if you have a history of heart failure or if you experience any of the following symptoms:  Weight gain of 3 pounds or more overnight or 5 pounds in a week, increased swelling in our hands or feet or shortness of breath while lying flat in bed. Please call your doctor as soon as you notice any of these symptoms; do not wait until your next office visit.

## 2019-12-06 NOTE — ANESTHESIA POSTPROCEDURE EVALUATION
Procedure(s):  TRANSRECTAL ULTRASOUND GUIDED PROSTATE BIOPSY. general    Anesthesia Post Evaluation        Patient location during evaluation: PACU  Patient participation: complete - patient participated  Level of consciousness: responsive to verbal stimuli  Pain management: adequate  Airway patency: patent  Anesthetic complications: no  Cardiovascular status: acceptable  Respiratory status: acceptable  Hydration status: euvolemic        Vitals Value Taken Time   /68 12/6/2019  9:40 AM   Temp 36.6 °C (97.9 °F) 12/6/2019  9:00 AM   Pulse 58 12/6/2019  9:43 AM   Resp 16 12/6/2019  9:00 AM   SpO2 96 % 12/6/2019  9:43 AM   Vitals shown include unvalidated device data.

## 2019-12-06 NOTE — OP NOTES
Operative Note                Patient: Nisha Oliveros., [id number]    Date of Surgery: 12/06/19    Preoperative Diagnosis: elevated psa    Postoperative Diagnosis:  same    Surgeon(s) and Role:     * Loren Morales MD - Primary     Anesthesia:  LMA     Procedure: Procedure(s):  TRUS guided prostate biopsy     RISKS DISCUSSION:     Discussed the risk of surgery including infection, hematoma, bleeding, and the risks of general anesthetic. The patient understands the risks, any and all questions were answered to the patient's satisfaction and they freely signed the consent for operation. TRANSRECTAL ULTRASOUND GUIDED BIOPSY OF THE PROSTATE    All risks, benefits and alternatives were again reviewed and he is willing to proceed at this time. The patient was placed in the left lateral decubitus position. I then inserted the transrectal ultrasound probe into the rectum. The prostate was visualized. The prostate appeared homogenous in appearance. Sizing was performed. 12 biopsies were then obtained using the ultrasound images for guidance using a standard sextant biopsy format. The ultrasound probe was removed. The patient tolerated the procedure well.       PROSTATE VOLUME:  53.5 gr    Estimated Blood Loss:  minimal    Specimens: prostate biopsies             Drains: none                 Implants: * No implants in log *           Signed By: Candace Martinez MD

## 2020-01-09 ENCOUNTER — HOSPITAL ENCOUNTER (OUTPATIENT)
Dept: NUCLEAR MEDICINE | Age: 75
Discharge: HOME | End: 2020-01-09
Attending: UROLOGY
Payer: MEDICARE

## 2020-01-09 ENCOUNTER — HOSPITAL ENCOUNTER (OUTPATIENT)
Dept: MRI IMAGING | Age: 75
Discharge: HOME | End: 2020-01-09
Attending: UROLOGY
Payer: MEDICARE

## 2020-01-09 DIAGNOSIS — C61 PROSTATE CANCER (HCC): ICD-10-CM

## 2020-01-09 PROCEDURE — A9503 TC99M MEDRONATE: HCPCS

## 2020-01-09 PROCEDURE — A9575 INJ GADOTERATE MEGLUMI 0.1ML: HCPCS | Performed by: UROLOGY

## 2020-01-09 PROCEDURE — 78306 BONE IMAGING WHOLE BODY: CPT

## 2020-01-09 PROCEDURE — 72197 MRI PELVIS W/O & W/DYE: CPT

## 2020-01-09 PROCEDURE — 74011000258 HC RX REV CODE- 258: Performed by: UROLOGY

## 2020-01-09 PROCEDURE — 74011250636 HC RX REV CODE- 250/636: Performed by: UROLOGY

## 2020-01-09 RX ORDER — GADOTERATE MEGLUMINE 376.9 MG/ML
18 INJECTION INTRAVENOUS
Status: COMPLETED | OUTPATIENT
Start: 2020-01-09 | End: 2020-01-09

## 2020-01-09 RX ORDER — SODIUM CHLORIDE 0.9 % (FLUSH) 0.9 %
10 SYRINGE (ML) INJECTION
Status: COMPLETED | OUTPATIENT
Start: 2020-01-09 | End: 2020-01-09

## 2020-01-09 RX ADMIN — SODIUM CHLORIDE 100 ML: 900 INJECTION, SOLUTION INTRAVENOUS at 08:24

## 2020-01-09 RX ADMIN — GADOTERATE MEGLUMINE 18 ML: 376.9 INJECTION INTRAVENOUS at 08:24

## 2020-01-09 RX ADMIN — Medication 10 ML: at 08:24

## 2020-01-15 ENCOUNTER — HOSPITAL ENCOUNTER (OUTPATIENT)
Dept: RADIATION ONCOLOGY | Age: 75
Discharge: HOME | End: 2020-01-15
Payer: MEDICARE

## 2020-01-15 VITALS
SYSTOLIC BLOOD PRESSURE: 158 MMHG | WEIGHT: 202.7 LBS | BODY MASS INDEX: 30.37 KG/M2 | TEMPERATURE: 97.9 F | DIASTOLIC BLOOD PRESSURE: 86 MMHG | OXYGEN SATURATION: 98 % | HEART RATE: 54 BPM

## 2020-01-15 PROCEDURE — 99211 OFF/OP EST MAY X REQ PHY/QHP: CPT

## 2020-01-15 RX ORDER — BICALUTAMIDE 50 MG/1
50 TABLET, FILM COATED ORAL DAILY
Qty: 30 TAB | Refills: 5 | Status: SHIPPED | OUTPATIENT
Start: 2020-01-15 | End: 2020-01-15

## 2020-01-15 RX ORDER — PRAVASTATIN SODIUM 40 MG/1
80 TABLET ORAL
COMMUNITY
End: 2020-01-15 | Stop reason: SDUPTHER

## 2020-01-15 NOTE — NURSE NAVIGATOR
Consult for prostate cancer. Aua/Epic completed. Aua score 12.  US abdomen 8-29-19. CT abdomen 9-19-19. MRI abdomen 9-21-19. Last psa 10-15-19. Biopsy 12-6-19. MRI pelvis and bone scan on 1-9-20. F/u Dr. Holley Walker 6-18-20. Pt will be presented at [place] [id number] 1-21-20. Order for Lupron 45 mg x 2 years sent to [facility]. Casodex script given to pt. Pt scheduled for f/u with Dr. Renee Guzman in 3 weeks.      Bella Velasquez RN

## 2020-01-15 NOTE — CONSULTS
Patient: Radha Kumar Sr. MRN: [id number]  SSN: [id number]    YOB: 1945  Age: 69 y.o. Sex: male      Other Providers: Hodan Thompson MD    CHIEF COMPLAINT: Prostate Cancer    DIAGNOSIS: Very high risk prostate adenocarcinoma, [id number], PSA 21.5 (Density 0.41), GS 4+4=8. PREVIOUS TREATMENT:  1)  TRUS Biopsy 12/6/19    HISTORY OF PRESENT ILLNESS:  Radha Kumar Sr. is a 69 y.o. male who I am seeing at the request of Dr. Alison Sherman for prostate cancer. He was seen and evaluated for an elevated PSA on routine screening. He does have a prior negative prostate biopsy. His pertinent past medical history includes CAD s/p CABG, . He has undergone previous inguinal and umbilical hernia repairs with mesh. He does have a brother diagnosed with prostate cancer in his mid 63's. PSA History:    10/15 6.0, 4/16 5.5, 10/16 7.8, 11/17 9.3, 9/18 11.38, 1/19 16.2, 6/19 21.3, 7/19 21.5    Biopsy Results:  53 cc gland. 6/12 cores involved. 12/6/19:  Biopsy done in the OR as previous biopsy was overly painful. 2 cores GS 4+4=8 left lat apex and let apex, 35 and 80%. 2 cores GS 4+3=7 left lat mid and left mid. 1 cores GS 3+4=7 right mid  1 core GS 3+3=6 right lat base. He was ultimately seen back in the urology office to discuss the results of the biopsy and management options. Bone scan was negative and MRI on the same day, 1/9/20, showed involvement bilateral seminal vesicles and extracapsular extension on the left at the level of the mid gland and apex. Both radiation and surgical options were discussed and he has been referred to me to further discuss radiation as an option for management of his prostate cancer. The patient was most interested in radiation based options. GENITOURINARY REVIEW OF SYSTEMS:  An EPIC 26 was completed pretreatment. His AUA score was 12. He had challenges with nocturia, frequency, and incomplete emptying.        PAST MEDICAL HISTORY:    Past Medical History: Diagnosis Date    Arrhythmia 8/2015 at [facility]    A-Flutter ablation--- dr Sonia Weston    Aspirin long-term use     CAD (coronary artery disease)     mi--2/2015 cabg 2/2015---- No stents    CAD in native artery 2/19/2015    2/19/15 (Dr Nathalie Rock) Coronary artery bypass grafting x2 with grafts consisting of bilateral mammaries     Elevated hemidiaphragm 2/21/2015 2/20 barium swallow on 2/12/15 that revealed an unremarkable esophagus and paralyzed left diaphragm with an unusual rotation of the stomach in the left upper quadrant  esophagram 2/12/2015 which revealed an abnormal contour the left hemidiaphragm, suggesting diaphragmatic hernia. 2/20 Barium Swallow Barium swallow today to assess for any signs of possible torsion. If no torsion present, will likely recommend outpatient surgical consult and continued follow up with Dr. Han Kapoor in [place].       Elevated hemoglobin A1c 2/20/2015    6.1 - 2/17/15     Full dentures 2/21/2015    GERD (gastroesophageal reflux disease)     controlled with med    Hernia of abdominal cavity 6/26/2015    History of atrial flutter 8/2015    ablation    History of complete eye exam 01/01/2015    [facility]    Hypercholesterolemia     Hyperlipidemia     Hyperlipidemia 2/18/2015    Hypertension     controlled with med    Hypoxemia 2/21/2015    MI, old 2/2015    NSTEMI (non-ST elevated myocardial infarction) ([facility] [address] 2/14/2015    OA (osteoarthritis) 2/21/2015    Pleural effusion on right 2/21/2015    Postoperative anemia due to acute blood loss 2/19/2015    S/P CABG x 2 2/2015    Thrombocytopenia due to extra corporeal by-pass circulation 2/19/2015    Patient denies    Varicose veins of lower extremities with other complications 5/52/2097    7/24/15 (Dr Michael Levi) L [facility] RFA ablation 5/15/14 (Dr. Michael Levi) R [facility] Radiofrequency ablation     Wears dentures        The patient denies history of collagen vascular diseases, pacemaker insertion, prior radiation or prior chemotherapy. PAST SURGICAL HISTORY:   Past Surgical History:   Procedure Laterality Date    CABG, ARTERY-VEIN, TWO  2015    Dr. Tory Melgoza COLONOSCOPY N/A 3/9/2018    COLONOSCOPY/ 28 performed by Sophia Jurado MD [facility] ENDOSCOPY    HX COLONOSCOPY  2012    HX CORONARY ARTERY BYPASS GRAFT  2/15    DR. Bailey Vega HX FRACTURE TX Right 2007    elbow fx    HX HEART CATHETERIZATION  2/2015 and 8/15    HX HEART CATHETERIZATION  8/2015     [id number]    HX HERNIA REPAIR  1800    umbilical    HX HERNIA REPAIR Left unsure of 2nd surgery    LIH and redo embilical, Both Repairs at same time    HX KNEE ARTHROSCOPY Left 2006    left knee    HX ORTHOPAEDIC Right 2007    right elbow fx    HX VEIN STRIPPING      [place] COLSC FLX W/REMOVAL LESION BY HOT BX FORCEPS  3/9/2018            MEDICATIONS:     Current Outpatient Medications:     pravastatin (PRAVACHOL) 40 mg tablet, Take 80 mg by mouth nightly., Disp: , Rfl:     doxazosin (CARDURA) 8 mg tablet, Take 1 Tab by mouth two (2) times a day., Disp: 180 Tab, Rfl: 3    omeprazole (PRILOSEC) 20 mg capsule, TAKE 1 CAPSULE TWO TIMES A DAY FOR GASTROESOPHAGEAL REFLUX, Disp: 180 Cap, Rfl: 3    metoprolol tartrate (LOPRESSOR) 25 mg tablet, TAKE 1 TABLET TWICE DAILY FOR HYPERTENSION, Disp: 180 Tab, Rfl: 3    lisinopril (PRINIVIL, ZESTRIL) 20 mg tablet, TAKE 1 TABLET TWICE DAILY FOR HYPERTENSION, Disp: 180 Tab, Rfl: 3    pravastatin (PRAVACHOL) 80 mg tablet, Take 1 Tab by mouth nightly., Disp: 90 Tab, Rfl: 3    aspirin delayed-release 81 mg tablet, Take  by mouth daily. , Disp: , Rfl:     Ca-D3-mag ox-zinc--perlita-bor (CALCIUM 600-D3 PLUS) 600 mg calcium- 800 unit-50 mg tab, Take 1 Tab by mouth daily. , Disp: , Rfl:     OTHER,NON-FORMULARY,, Libido-Max   Takes 2 in AM and 2 tabs at HS, Disp: , Rfl:     OTHER,NON-FORMULARY,, Urinozinc Prostate   Takes 2 tabs daily, Disp: , Rfl:     ferrous sulfate 325 mg (65 mg iron) tablet, Take 325 mg by mouth Daily (before breakfast). , Disp: , Rfl:     Fish Oil-Omega-3 Fatty Acids (FISH OIL OMEGA 3-6-9) 300-1,000 mg cpDR, Take 2 Tabs by mouth every morning. Indications: last dose 3/7/18, Disp: , Rfl:     MULTIVITS-MINERALS/FA/LYCOPENE (ONE-A-DAY MEN'S PO), Take 1 Tab by mouth daily.  Indications: last dose 3/7/18, Disp: , Rfl:     ALLERGIES:   No Known Allergies    SOCIAL HISTORY:   Social History     Socioeconomic History    Marital status: SINGLE     Spouse name: Not on file    Number of children: Not on file    Years of education: Not on file    Highest education level: Not on file   Occupational History    Not on file   Social Needs    Financial resource strain: Not on file    Food insecurity:     Worry: Not on file     Inability: Not on file    Transportation needs:     Medical: Not on file     Non-medical: Not on file   Tobacco Use    Smoking status: Former Smoker     Packs/day: 0.25     Years: 40.00     Pack years: 10.00     Last attempt to quit: 2001     Years since quittin.7    Smokeless tobacco: Never Used   Substance and Sexual Activity    Alcohol use: No    Drug use: No    Sexual activity: Not on file   Lifestyle    Physical activity:     Days per week: Not on file     Minutes per session: Not on file    Stress: Not on file   Relationships    Social connections:     Talks on phone: Not on file     Gets together: Not on file     Attends [employer]ism service: Not on file     Active member of club or organization: Not on file     Attends meetings of clubs or organizations: Not on file     Relationship status: Not on file    Intimate partner violence:     Fear of current or ex partner: Not on file     Emotionally abused: Not on file     Physically abused: Not on file     Forced sexual activity: Not on file   Other Topics Concern     Service Not Asked    Blood Transfusions Not Asked    Caffeine Concern Not Asked    Occupational Exposure Not Asked    Hobby Hazards Not Asked    Sleep Concern Not Asked    Stress Concern Not Asked    Weight Concern Not Asked    Special Diet Not Asked    Back Care Not Asked    Exercise Not Asked    Bike Helmet Not Asked   [address] Not Asked    Self-Exams Not Asked   Social History Narrative    Not on file       FAMILY HISTORY:   Family History   Problem Relation Age of Onset    Heart Disease Brother     Hypertension Brother     Heart Disease Father     Hypertension Sister     Hypertension Brother     No Known Problems Mother        REVIEW OF SYSTEMS: Please see the completed review of systems sheet in the chart that I have reviewed today. PHYSICAL EXAMINATION:   ECOG Performance status 0  VITAL SIGNS:   Visit Vitals  /86 (BP 1 Location: Left arm, BP Patient Position: Sitting)   Pulse (!) 54   Temp 97.9 °F (36.6 °C)   Wt 91.9 kg (202 lb 11.2 oz)   SpO2 98%   BMI 30.37 kg/m²        GENERAL: The patient is well-developed, ambulatory, alert and in no acute distress. HEENT: Head is normocephalic, atraumatic. Pupils are equal, round and reactive to light and accommodation. Extraocular movement intact. Hearing is intact bilaterally to finger rub. Oral cavity reveals no lesions. Mucous membranes are moist. NECK: Neck is supple with no masses. CARDIOVASCULAR: Heart is regular rate and rhythm. There are no murmurs rubs or gallups. Radial pulses are 2+ RESPIRATORY: Lungs are clear to auscultation and percussion. There is normal respiratory effort. GASTROINTESTINAL: The abdomen is soft, non-tender, nondistended with no hepatospelnomagaly. Digital rectal examination: No evidence of internal or external hemorrhoids, clear rectal vault without palpable masses, smooth prostate gland with no nodularity. LYMPHATIC: There is no cervical, supraclavicular or axillary lymphadenopathy bilaterally. MUSCULOSKELETAL: Extremities reveal no cyanosis, clubbing or edema.  is 5+/5. NEURO:  Cranial nerves II-XII grossly intact.   Muscular strength and sensation are intact throughout all four extremities. PATHOLOGY:    Please see HPI for details of TRUS Biopsy. 12/6/19:     DIAGNOSIS   A: \"PROSTATE, RIGHT LATERAL BASE, BIOPSY\": PROSTATIC ADENOCARCINOMA, NICHO SCORE 3 + 3 = 6 (GRADE GROUP 1), INVOLVING LESS THAN 5% OF BIOPSY. B: \"PROSTATE, RIGHT BASE, BIOPSY\": PROSTATE TISSUE WITH A SMALL FOCUS OF ATYPICAL GLANDS. C: \"PROSTATE, RIGHT LATERAL MID, BIOPSY\": PROSTATE TISSUE, NO CARCINOMA IDENTIFIED. D: \"PROSTATE, RIGHT MID, BIOPSY\": PROSTATIC ADENOCARCINOMA, NICHO SCORE   3 + 4 = 7 (GRADE GROUP 2), DISCONTINUOUSLY INVOLVING 35% OF BIOPSY. PERCENTAGE OF GRADE 4: APPROXIMATELY 15%. E: \"PROSTATE, RIGHT LATERAL APEX, BIOPSY\": PROSTATE TISSUE, NO CARCINOMA IDENTIFIED. F: \"PROSTATE, RIGHT APEX, BIOPSY\": PROSTATE TISSUE, NO CARCINOMA IDENTIFIED. G: \"PROSTATE, LEFT LATERAL BASE, BIOPSY\": PROSTATE TISSUE, NO CARCINOMA IDENTIFIED. H: \"PROSTATE, LEFT BASE, BIOPSY\": SEMINAL VESICLE TISSUE, NO CARCINOMA IDENTIFIED. I: \"PROSTATE, LEFT LATERAL MID, BIOPSY\": PROSTATIC ADENOCARCINOMA, NICHO SCORE 4 + 3 = 7 (GRADE GROUP 3), DISCONTINUOUSLY INVOLVING 65% OF BIOPSY. PERINEURAL INVASION PRESENT.   J: \"PROSTATE, LEFT MID, BIOPSY\": PROSTATIC ADENOCARCINOMA, NICHO SCORE   4 + 3 = 7 (GRADE GROUP 3), INVOLVING 35% OF BIOPSY. PERINEURAL INVASION PRESENT.   K: \"PROSTATE, LEFT LATERAL APEX, BIOPSY\": PROSTATIC ADENOCARCINOMA, NICHO SCORE 4 + 4 = 8 (GRADE GROUP 4), DISCONTINUOUSLY INVOLVING 80% OF BIOPSY. PERINEURAL INVASION PRESENT. L: \"PROSTATE, LEFT APEX, BIOPSY\": PROSTATIC ADENOCARCINOMA, NICHO SCORE   4 + 4 = 8 (GRADE GROUP 4), INVOLVING 35% OF BIOPSY. PERINEURAL INVASION PRESENT.      LABORATORY:   Lab Results   Component Value Date/Time    Sodium 137 12/06/2019 07:50 AM    Potassium 3.8 12/06/2019 07:50 AM    Chloride 101 12/06/2019 07:50 AM    CO2 30 12/06/2019 07:50 AM    Anion gap 6 (L) 12/06/2019 07:50 AM    Glucose 84 12/06/2019 07:50 AM    BUN 14 12/06/2019 07:50 AM    Creatinine 0.79 (L) 12/06/2019 07:50 AM    GFR est AA >60 12/06/2019 07:50 AM    GFR est non-AA >60 12/06/2019 07:50 AM    Calcium 9.1 12/06/2019 07:50 AM    Magnesium 2.0 08/26/2015 10:40 AM    Albumin 4.0 08/05/2019 09:06 AM    Protein, total 6.7 08/05/2019 09:06 AM    Globulin 3.7 (H) 08/26/2015 10:40 AM    A-G Ratio 1.5 08/05/2019 09:06 AM    AST (SGOT) 45 (H) 08/05/2019 09:06 AM    ALT (SGPT) 24 08/05/2019 09:06 AM     Lab Results   Component Value Date/Time    WBC 4.4 12/06/2019 07:50 AM    HGB 13.5 (L) 12/06/2019 07:50 AM    HCT 42.5 12/06/2019 07:50 AM    PLATELET 705 94/53/3248 07:50 AM       Please see HPI for PSA History. RADIOLOGY:    Nm Bone Scan Wh Body    Result Date: 1/9/2020  WHOLE BODY BONE SCAN CLINICAL INDICATION: MRI pelvis 1/9/2020 COMPARISON: There are no previous relevant studies available for correlation. TECHNIQUE:  Anterior and posterior images of the whole body were obtained approximately 3 hours after injection of mCi of Technetium-99m-HDP intravenously. Bilateral anterior and posterior oblique images of the torso and pelvis as well as bilateral lateral views of the skull were also obtained. FINDINGS:   There is no definite abnormal radiotracer uptake in the area of concern in the superior/left aspect of the L3 vertebral body on the prior MRI of the pelvis. Therefore, this is likely to be degenerative. There is excreted radiotracer in the urinary bladder and in the perineal region. There is symmetric degenerative radiotracer uptake involving the knees, shoulders, and sternoclavicular joints. Likely degenerative uptake in the cervical and thoracic spine as well. There are no definite areas of radiotracer uptake suspicious for osseous metastatic disease. IMPRESSION:  No definite scintigraphic evidence of osseous metastatic disease. Tiajuana Doug Wo Cont    Result Date: 1/9/2020  Examination: Prostate MRI 1/9/2020 Comparison: None.  Indication: Prostate carcinoma Technique:  Multiplanar, multisequence MR imaging was performed of the prostate prior to and following gadolinium contrast. 18 mL of Dotarem contrast material was administrated intravenously for the examination. This study was acquired following the IV administration of contrast material, given the patient's indications for the examination. If IV contrast material had not been administered, the likely of detecting abnormalities relevant to the patient's condition would have been substantially decreased. Findings: PROSTATE SIZE: The gland measures 5.6 x 5.2 x 4.2 cm. PERIPHERAL GLAND: Extensive large volume disease present within the left gland particularly involving the mid gland to the apex where crosses the midline to also involve the right apex, medial right mid gland, right base. The left bases also involved with abnormal T2 signal seen involving bilateral ejaculatory duct orifice. There is diffuse decreased abnormal T2 signal throughout the left seminal vesicle suggesting sequelae of obstruction. CENTRAL GLAND: There are T2 hyperintense nodules in the central gland consistent with hypertrophy. CAPSULE: There is loss of the capsule posterior lateral margin left mid gland and apex. PERINEURAL: Left neurovascular bundle likely involved by tumor. SEMINAL VESICLES: As above LYMPH NODES: No pelvic lymph adenopathy. Limited imaging of the lower abdomen shows no adenopathy or free fluid. BONES: There is enhancement of the superior endplate L3 on the left. BLADDER AND RECTUM: The bladder is unremarkable. Rectum unremarkable. IMPRESSION: 1. Extensive abnormal T2 signal with corresponding abnormal DWI, ADC and enhancement throughout the left gland involving the right as outlined above. There appears to be involvement bilateral seminal vesicle as above. Extracapsular extension on the left at the level of the mid gland and apex. 2. No lymphadenopathy.  3. Enhancement only seen on the coronal post contrast data set involving the left aspect of the superior endplate of L3 is nonspecific. Correlation bone scan recommended. IMPRESSION:  eKlly Grimes Sr. is a 69 y.o. male with high risk prostate cancer. The natural history of prostate cancer was reviewed with the patient. Prognostic features including stage, Vernell score, age, race, and PSA were reviewed. Treatment options for prostate cancer including active surveillance, hormonal therapy, radiotherapy, and surgery were compared and contrasted with regard to outcome and quality of life. I discussed the radiotherapy options including low-dose rate brachytherapy, high-dose-rate brachytherapy, and external beam radiation therapy. We also discussed the addition of hormone therapy. Side effects and complications of radiation therapy including fatigue, urinary obstructive symptoms, rectal irritation and bleeding, and decline of sexual function were among the complications highlighted. He asked many questions which were answered to his satisfaction. For high risk disease, there are several viable treatment options but I generally do not advise active surveillance. Surgery with radical prostatectomy or IMRT examination with a protracted course of androgen deprivation therapy ( traditionally 2-3 years) are appropriate options. There is however data in support of use of 18 months of androgen deprivation therapy (compared to 36 months) based on a study published in abstract format at [facility] 2017 from a randomized phase III trial where cancer outcomes were similar but imrproved quality of life was noted in the shorter ADT group. I advised the patient each of these options offer similar tumor contol with differences found mainly in varying toxicity profiles.      IMRT is given as a single modality treatment or as initial treatment prior to an HDR boost.  A total of 45 Gy would be delivered to the pelvis and prostate gland followed by a 15 gray in 1 fraction HDR boost to the prostate gland alone. For patients who are not candidates for brachytherapy either by poor anatomy meaning substantial arch interference or a prostate gland outside of 20-60 cc where good dosimetry is often not achievable, IMRT is given for the entirety of the treatment course. Furthermore, for low or intermediate risk, there is now substantial data in support of hypo-fractionated radiation. Commonly a dose greater than 2 Gy per fraction, such as 2.5 Gy as given in numerous clinical trials including RTOG 0415, to a total dose of 70 Gy offers similar toxicity outcomes and is found to be non-inferior to standard fractionated radiation and therefore more logistically appealing. He has completed all staging by time of his initial consultation with us. Unfortunately very high risk was demonstrated based on the clear sign of bilateral seminal vesicle invasion. 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 For that reason I would advise a combination of androgen deprivation therapy and radiation with coverage of his pelvic lymph nodes. The duration of hormone therapy has seen some change but 18 months up to 2 years would be advisable. I don't feel he would be a candidate for HDR with the degree of seminal vesicle involvement. Furthermore, he would be high risk for surgery and this was not his interest and therefore we will proceed with radiation after tumor board presentation. We will go ahead and begin androgen deprivation so as not to delay therapy further along with bone density scan, calcium, vitamin D, and Casodex. PLAN:    1) Discussed viable treatment options focusing on external beam radiation highlighting the risks, benefits, and side effects from treatment. 2) Reviewed available research treatment and cancer care protocols for which patient may be eligible. Unfortunately there are no matching clinical trials available at this time.    3) Patient to be presented in tumor board but start Lupron/Casodex, Vit D, Ca, and bone density scan. 4) Patient will be scheduled for follow up after presentation in tumor board before making a treatment decision.        Josef Cuellar MD   January 15, 2020

## 2020-02-05 ENCOUNTER — HOSPITAL ENCOUNTER (OUTPATIENT)
Dept: RADIATION ONCOLOGY | Age: 75
Discharge: HOME | End: 2020-02-05

## 2020-02-05 VITALS
OXYGEN SATURATION: 94 % | BODY MASS INDEX: 30.77 KG/M2 | WEIGHT: 202.4 LBS | RESPIRATION RATE: 16 BRPM | SYSTOLIC BLOOD PRESSURE: 142 MMHG | DIASTOLIC BLOOD PRESSURE: 78 MMHG | TEMPERATURE: 97.6 F

## 2020-02-05 NOTE — PROGRESS NOTES
Pt here today for the RT treatment discussion with Dr. Kvng Shaikh for prostate cancer. Pt is s/p MDC. His 1/9/20 MRI Pelvis was positive for both prostate cancer and EC. The Bone Scan was negative. Pt started Lupron/Casodex on 1/17/20. The 1/17/20 PSA is 25.6. He is followed in Urology by Dr. Alfred Edwards and will see him again on 6/18/20. Pt stated that he is on a fixed income and the \"co-pays add up,\" so he feels this MD visit could have been done by a phone call from Dr. Kvng Shaikh. Dr. Kvng Shaikh was notified and pt will not be charged for today's visit. Pt will be treated at [facility] and will complete the CT/Sim there in approx. one month. Minnie Toscano RT consents signed.

## 2020-02-05 NOTE — PROGRESS NOTES
Patient: Tracy Lundy Sr. MRN: [id number]  SSN: [id number]    YOB: 1945  Age: 77 y.o. Sex: male      Other Providers: Mir Davis MD    DIAGNOSIS: Very high risk prostate adenocarcinoma, [id number], PSA 21.5 (Density 0.41), GS 4+4=8. PREVIOUS TREATMENT:  1)  TRUS Biopsy 12/6/19  2) 1/17/20:  Lupron and Casodex (Planned for 2 years ADT)    HISTORY OF PRESENT ILLNESS:  Tracy Lundy Sr. is a 77 y.o. male who I am seeing at the request of Dr. Abad Clinton for prostate cancer back after original consultation 1/15/20. He was seen and evaluated for an elevated PSA on routine screening. He does have a prior negative prostate biopsy. His pertinent past medical history includes CAD s/p CABG, . He has undergone previous inguinal and umbilical hernia repairs with mesh. He does have a brother diagnosed with prostate cancer in his mid 63's. PSA History:    10/15 6.0, 4/16 5.5, 10/16 7.8, 11/17 9.3, 9/18 11.38, 1/19 16.2, 6/19 21.3, 7/19 21.5    Biopsy Results:  53 cc gland. 6/12 cores involved. 12/6/19:  Biopsy done in the OR as previous biopsy was overly painful. 2 cores GS 4+4=8 left lat apex and let apex, 35 and 80%. 2 cores GS 4+3=7 left lat mid and left mid. 1 cores GS 3+4=7 right mid  1 core GS 3+3=6 right lat base. He was ultimately seen back in the urology office to discuss the results of the biopsy and management options. Bone scan was negative and MRI on the same day, 1/9/20, showed involvement bilateral seminal vesicles and extracapsular extension on the left at the level of the mid gland and apex. Both radiation and surgical options were discussed and he has been referred to me to further discuss radiation as an option for management of his prostate cancer. The patient was most interested in radiation based options. With his very high risk disease I outlined a course of 2 years of androgen deprivation therapy with fractionated radiation covering his pelvic lymph nodes.   I then had him presented in conference he returned 2/5/2020 make final treatment plans. To facilitate treatment we did go ahead and start him on normal therapy receiving his six-month hormone injection 1/17/2020. He was tolerating this well without any side effects outside of potentially knee pain but it was unclear if this was a result of the Casodex. GENITOURINARY REVIEW OF SYSTEMS:  An EPIC 26 was completed pretreatment. His AUA score was 12. He had challenges with nocturia, frequency, and incomplete emptying. PAST MEDICAL HISTORY:    Past Medical History:   Diagnosis Date    Arrhythmia 8/2015 at [facility]    A-Flutter ablation--- dr Juana Art    Aspirin long-term use     CAD (coronary artery disease)     mi--2/2015 cabg 2/2015---- No stents    CAD in native artery 2/19/2015    2/19/15 (Dr Rose Marie Hoang) Coronary artery bypass grafting x2 with grafts consisting of bilateral mammaries     Cancer [facility])     prostate    Elevated hemidiaphragm 2/21/2015 2/20 barium swallow on 2/12/15 that revealed an unremarkable esophagus and paralyzed left diaphragm with an unusual rotation of the stomach in the left upper quadrant  esophagram 2/12/2015 which revealed an abnormal contour the left hemidiaphragm, suggesting diaphragmatic hernia. 2/20 Barium Swallow Barium swallow today to assess for any signs of possible torsion. If no torsion present, will likely recommend outpatient surgical consult and continued follow up with Dr. Joelle Miramontes in [facility].       Elevated hemoglobin A1c 2/20/2015    6.1 - 2/17/15     Full dentures 2/21/2015    GERD (gastroesophageal reflux disease)     controlled with med    Hernia of abdominal cavity 6/26/2015    History of atrial flutter 8/2015    ablation    History of complete eye exam 01/01/2015    [facility]    Hypercholesterolemia     Hyperlipidemia     Hyperlipidemia 2/18/2015    Hypertension     controlled with med    Hypoxemia 2/21/2015    MI, old 2/2015    NSTEMI (non-ST elevated myocardial infarction) ([facility] [address] 2/14/2015    OA (osteoarthritis) 2/21/2015    Pleural effusion on right 2/21/2015    Postoperative anemia due to acute blood loss 2/19/2015    S/P CABG x 2 2/2015    Thrombocytopenia due to extra corporeal by-pass circulation 2/19/2015    Patient denies    Varicose veins of lower extremities with other complications 1/22/5567    7/24/15 (Dr Cao) L GSV RFA ablation 5/15/14 (Dr. Cao) R GSV Radiofrequency ablation     Wears dentures        The patient denies history of collagen vascular diseases, pacemaker insertion, prior radiation or prior chemotherapy. PAST SURGICAL HISTORY:   Past Surgical History:   Procedure Laterality Date    CABG, ARTERY-VEIN, TWO  2015    Dr. Renate Moore COLONOSCOPY N/A 3/9/2018    COLONOSCOPY/ 28 performed by Yulissa Burns MD at [facility] ENDOSCOPY    HX COLONOSCOPY  2012    HX CORONARY ARTERY BYPASS GRAFT  2/15    DR. Bahman Espinoza HX FRACTURE TX Right 2007    elbow fx    HX HEART CATHETERIZATION  2/2015 and 8/15    HX HEART CATHETERIZATION  8/2015     [id number]    HX HERNIA REPAIR  6172    umbilical    HX HERNIA REPAIR Left unsure of 2nd surgery    LIH and redo embilical, Both Repairs at same time    HX KNEE ARTHROSCOPY Left 2006    left knee    HX ORTHOPAEDIC Right 2007    right elbow fx    HX VEIN STRIPPING      DE COLSC FLX W/REMOVAL LESION BY HOT BX FORCEPS  3/9/2018            MEDICATIONS:     Current Outpatient Medications:     pravastatin (PRAVACHOL) 40 mg tablet, Take 2 Tabs by mouth nightly., Disp: 90 Tab, Rfl: 3    doxazosin (CARDURA) 8 mg tablet, Take 1 Tab by mouth two (2) times a day., Disp: 180 Tab, Rfl: 3    omeprazole (PRILOSEC) 20 mg capsule, TAKE 1 CAPSULE TWO TIMES A DAY FOR GASTROESOPHAGEAL REFLUX, Disp: 180 Cap, Rfl: 3    metoprolol tartrate (LOPRESSOR) 25 mg tablet, TAKE 1 TABLET TWICE DAILY FOR HYPERTENSION, Disp: 180 Tab, Rfl: 3    lisinopril (PRINIVIL, ZESTRIL) 20 mg tablet, TAKE 1 TABLET TWICE DAILY FOR HYPERTENSION, Disp: 180 Tab, Rfl: 3    pravastatin (PRAVACHOL) 80 mg tablet, Take 1 Tab by mouth nightly., Disp: 90 Tab, Rfl: 3    aspirin delayed-release 81 mg tablet, Take  by mouth daily. , Disp: , Rfl:     Ca-D3-mag ox-zinc--perlita-bor (CALCIUM 600-D3 PLUS) 600 mg calcium- 800 unit-50 mg tab, Take 1 Tab by mouth daily. , Disp: , Rfl:     OTHER,NON-FORMULARY,, Libido-Max   Takes 2 in AM and 2 tabs at HS, Disp: , Rfl:     OTHER,NON-FORMULARY,, Urinozinc Prostate   Takes 2 tabs daily, Disp: , Rfl:     ferrous sulfate 325 mg (65 mg iron) tablet, Take 325 mg by mouth Daily (before breakfast). , Disp: , Rfl:     Fish Oil-Omega-3 Fatty Acids (FISH OIL OMEGA 3-6-9) 300-1,000 mg cpDR, Take 2 Tabs by mouth every morning. Indications: last dose 3/7/18, Disp: , Rfl:     MULTIVITS-MINERALS/FA/LYCOPENE (ONE-A-DAY MEN'S PO), Take 1 Tab by mouth daily.  Indications: last dose 3/7/18, Disp: , Rfl:     ALLERGIES:   No Known Allergies    SOCIAL HISTORY:   Social History     Socioeconomic History    Marital status: SINGLE     Spouse name: Not on file    Number of children: Not on file    Years of education: Not on file    Highest education level: Not on file   Occupational History    Not on file   Social Needs    Financial resource strain: Not on file    Food insecurity:     Worry: Not on file     Inability: Not on file    Transportation needs:     Medical: Not on file     Non-medical: Not on file   Tobacco Use    Smoking status: Former Smoker     Packs/day: 0.25     Years: 40.00     Pack years: 10.00     Last attempt to quit: 2001     Years since quittin.7    Smokeless tobacco: Never Used   Substance and Sexual Activity    Alcohol use: No    Drug use: No    Sexual activity: Not on file   Lifestyle    Physical activity:     Days per week: Not on file     Minutes per session: Not on file    Stress: Not on file   Relationships    Social connections:     Talks on phone: Not on file     Gets together: Not on file     Attends Congregational service: Not on file     Active member of club or organization: Not on file     Attends meetings of clubs or organizations: Not on file     Relationship status: Not on file    Intimate partner violence:     Fear of current or ex partner: Not on file     Emotionally abused: Not on file     Physically abused: Not on file     Forced sexual activity: Not on file   Other Topics Concern     Service Not Asked    Blood Transfusions Not Asked    Caffeine Concern Not Asked    Occupational Exposure Not Asked   Harriet Kipper Hazards Not Asked    Sleep Concern Not Asked    Stress Concern Not Asked    Weight Concern Not Asked    Special Diet Not Asked    Back Care Not Asked    Exercise Not Asked    Bike Helmet Not Asked    Seat Belt Not Asked    Self-Exams Not Asked   Social History Narrative    Not on file       FAMILY HISTORY:   Family History   Problem Relation Age of Onset    Heart Disease Brother     Hypertension Brother     Heart Disease Father     Hypertension Sister     Hypertension Brother     No Known Problems Mother        PHYSICAL EXAMINATION:   ECOG Performance status 0  VITAL SIGNS:   Visit Vitals  /78   Temp 97.6 °F (36.4 °C)   Resp 16   Wt 91.8 kg (202 lb 6.4 oz)   SpO2 94%   BMI 30.77 kg/m²        GENERAL: The patient is well-developed, ambulatory, alert and in no acute distress. CARDIOVASCULAR: Heart is regular rate and rhythm. There are no murmurs rubs or gallups. Radial pulses are 2+ RESPIRATORY: Lungs are clear to auscultation and percussion. There is normal respiratory effort. GASTROINTESTINAL: The abdomen is soft, non-tender, nondistended with no hepatospelnomagaly. Digital rectal examination: COPIED FROM PRIOR - No evidence of internal or external hemorrhoids, clear rectal vault without palpable masses, smooth prostate gland with no nodularity.        PATHOLOGY:    Please see HPI for details of TRUS Biopsy. 12/6/19:     DIAGNOSIS   A: \"PROSTATE, RIGHT LATERAL BASE, BIOPSY\": PROSTATIC ADENOCARCINOMA, NICHO SCORE 3 + 3 = 6 (GRADE GROUP 1), INVOLVING LESS THAN 5% OF BIOPSY. B: \"PROSTATE, RIGHT BASE, BIOPSY\": PROSTATE TISSUE WITH A SMALL FOCUS OF ATYPICAL GLANDS. C: \"PROSTATE, RIGHT LATERAL MID, BIOPSY\": PROSTATE TISSUE, NO CARCINOMA IDENTIFIED. D: \"PROSTATE, RIGHT MID, BIOPSY\": PROSTATIC ADENOCARCINOMA, NICHO SCORE   3 + 4 = 7 (GRADE GROUP 2), DISCONTINUOUSLY INVOLVING 35% OF BIOPSY. PERCENTAGE OF GRADE 4: APPROXIMATELY 15%. E: \"PROSTATE, RIGHT LATERAL APEX, BIOPSY\": PROSTATE TISSUE, NO CARCINOMA IDENTIFIED. F: \"PROSTATE, RIGHT APEX, BIOPSY\": PROSTATE TISSUE, NO CARCINOMA IDENTIFIED. G: \"PROSTATE, LEFT LATERAL BASE, BIOPSY\": PROSTATE TISSUE, NO CARCINOMA IDENTIFIED. H: \"PROSTATE, LEFT BASE, BIOPSY\": SEMINAL VESICLE TISSUE, NO CARCINOMA IDENTIFIED. I: \"PROSTATE, LEFT LATERAL MID, BIOPSY\": PROSTATIC ADENOCARCINOMA, NICHO SCORE 4 + 3 = 7 (GRADE GROUP 3), DISCONTINUOUSLY INVOLVING 65% OF BIOPSY. PERINEURAL INVASION PRESENT.   J: \"PROSTATE, LEFT MID, BIOPSY\": PROSTATIC ADENOCARCINOMA, NICHO SCORE   4 + 3 = 7 (GRADE GROUP 3), INVOLVING 35% OF BIOPSY. PERINEURAL INVASION PRESENT.   K: \"PROSTATE, LEFT LATERAL APEX, BIOPSY\": PROSTATIC ADENOCARCINOMA, NICHO SCORE 4 + 4 = 8 (GRADE GROUP 4), DISCONTINUOUSLY INVOLVING 80% OF BIOPSY. PERINEURAL INVASION PRESENT. L: \"PROSTATE, LEFT APEX, BIOPSY\": PROSTATIC ADENOCARCINOMA, NICHO SCORE   4 + 4 = 8 (GRADE GROUP 4), INVOLVING 35% OF BIOPSY. PERINEURAL INVASION PRESENT.      LABORATORY:   Lab Results   Component Value Date/Time    Sodium 137 12/06/2019 07:50 AM    Potassium 3.8 12/06/2019 07:50 AM    Chloride 101 12/06/2019 07:50 AM    CO2 30 12/06/2019 07:50 AM    Anion gap 6 (L) 12/06/2019 07:50 AM    Glucose 84 12/06/2019 07:50 AM    BUN 14 12/06/2019 07:50 AM    Creatinine 0.79 (L) 12/06/2019 07:50 AM    GFR est AA >60 12/06/2019 07:50 AM    GFR est non-AA >60 12/06/2019 07:50 AM    Calcium 9.1 12/06/2019 07:50 AM    Magnesium 2.0 08/26/2015 10:40 AM    Albumin 4.0 08/05/2019 09:06 AM    Protein, total 6.7 08/05/2019 09:06 AM    Globulin 3.7 (H) 08/26/2015 10:40 AM    A-G Ratio 1.5 08/05/2019 09:06 AM    AST (SGOT) 45 (H) 08/05/2019 09:06 AM    ALT (SGPT) 24 08/05/2019 09:06 AM     Lab Results   Component Value Date/Time    WBC 4.4 12/06/2019 07:50 AM    HGB 13.5 (L) 12/06/2019 07:50 AM    HCT 42.5 12/06/2019 07:50 AM    PLATELET 854 10/24/1716 07:50 AM       Please see HPI for PSA History. RADIOLOGY:    Nm Bone Scan Wh Body    Result Date: 1/9/2020  WHOLE BODY BONE SCAN CLINICAL INDICATION: MRI pelvis 1/9/2020 COMPARISON: There are no previous relevant studies available for correlation. TECHNIQUE:  Anterior and posterior images of the whole body were obtained approximately 3 hours after injection of mCi of Technetium-99m-HDP intravenously. Bilateral anterior and posterior oblique images of the torso and pelvis as well as bilateral lateral views of the skull were also obtained. FINDINGS:   There is no definite abnormal radiotracer uptake in the area of concern in the superior/left aspect of the L3 vertebral body on the prior MRI of the pelvis. Therefore, this is likely to be degenerative. There is excreted radiotracer in the urinary bladder and in the perineal region. There is symmetric degenerative radiotracer uptake involving the knees, shoulders, and sternoclavicular joints. Likely degenerative uptake in the cervical and thoracic spine as well. There are no definite areas of radiotracer uptake suspicious for osseous metastatic disease. IMPRESSION:  No definite scintigraphic evidence of osseous metastatic disease. Louise Díaz Wo Cont    Result Date: 1/9/2020  Examination: Prostate MRI 1/9/2020 Comparison: None.  Indication: Prostate carcinoma Technique:  Multiplanar, multisequence MR imaging was performed of the prostate prior to and following gadolinium contrast. 18 mL of Dotarem contrast material was administrated intravenously for the examination. This study was acquired following the IV administration of contrast material, given the patient's indications for the examination. If IV contrast material had not been administered, the likely of detecting abnormalities relevant to the patient's condition would have been substantially decreased. Findings: PROSTATE SIZE: The gland measures 5.6 x 5.2 x 4.2 cm. PERIPHERAL GLAND: Extensive large volume disease present within the left gland particularly involving the mid gland to the apex where crosses the midline to also involve the right apex, medial right mid gland, right base. The left bases also involved with abnormal T2 signal seen involving bilateral ejaculatory duct orifice. There is diffuse decreased abnormal T2 signal throughout the left seminal vesicle suggesting sequelae of obstruction. CENTRAL GLAND: There are T2 hyperintense nodules in the central gland consistent with hypertrophy. CAPSULE: There is loss of the capsule posterior lateral margin left mid gland and apex. PERINEURAL: Left neurovascular bundle likely involved by tumor. SEMINAL VESICLES: As above LYMPH NODES: No pelvic lymph adenopathy. Limited imaging of the lower abdomen shows no adenopathy or free fluid. BONES: There is enhancement of the superior endplate L3 on the left. BLADDER AND RECTUM: The bladder is unremarkable. Rectum unremarkable. IMPRESSION: 1. Extensive abnormal T2 signal with corresponding abnormal DWI, ADC and enhancement throughout the left gland involving the right as outlined above. There appears to be involvement bilateral seminal vesicle as above. Extracapsular extension on the left at the level of the mid gland and apex. 2. No lymphadenopathy. 3. Enhancement only seen on the coronal post contrast data set involving the left aspect of the superior endplate of L3 is nonspecific.  Correlation bone scan recommended. IMPRESSION:  Rudy Murillo Sr. is a 77 y.o. male with high risk prostate cancer. The natural history of prostate cancer was again reviewed with the patient. Prognostic features including stage, Vernell score, age, race, and PSA were reviewed. Treatment options for prostate cancer including active surveillance, hormonal therapy, radiotherapy, and surgery were compared and contrasted with regard to outcome and quality of life. I discussed the radiotherapy options including low-dose rate brachytherapy, high-dose-rate brachytherapy, and external beam radiation therapy. We also discussed the addition of hormone therapy. Side effects and complications of radiation therapy including fatigue, urinary obstructive symptoms, rectal irritation and bleeding, and decline of sexual function were among the complications highlighted. He had completed all staging by time of his initial consultation with us. Unfortunately very high risk was demonstrated based on the clear sign of bilateral seminal vesicle invasion. Furthermore he does have Vernell 8 disease. For that reason I would advise a combination of androgen deprivation therapy and radiation with coverage of his pelvic lymph nodes. The duration of hormone therapy has seen some change but 18 months up to 2 years would be advisable. I don't feel he would be a candidate for HDR with the degree of seminal vesicle involvement. Furthermore, he would be high risk for surgery and this was not his interest and therefore we will proceed with radiation after tumor board presentation. We went ahead and began androgen deprivation so as not to delay therapy further. After meeting back following tumor board presentation, the group agreed with our recommendations. We will therefore plan on seeing him back in 4 weeks for simulation so as to begin 2 months after beginning ADT. Again 79.2 Gy will be prescribed with 45 Gy pelvic node coverage.   Consent was obtained today for treatment at [address] [place] [address] which was his wish. PLAN:    1) Discussed viable treatment options focusing on external beam radiation highlighting the risks, benefits, and side effects from treatment. Consent obtained. 2) Reviewed available research treatment and cancer care protocols for which patient may be eligible. Unfortunately there are no matching clinical trials available at this time. 3) Patient to be seen back for simulation in 4 weeks at [address] [place] [address].    4) Patient will be scheduled for long term ADT with radiation employing pelvic node coverage. Portions of this note were copied from prior encounters and reviewed for accuracy, currency, and represent documentation and tasks completed during this encounter. I verify and attest these portions to be unchanged from prior visits.     Rosario French MD  02/05/20

## 2020-08-27 PROBLEM — K21.00 GASTROESOPHAGEAL REFLUX DISEASE WITH ESOPHAGITIS: Status: ACTIVE | Noted: 2020-08-27

## 2020-09-25 ENCOUNTER — HOSPITAL ENCOUNTER (OUTPATIENT)
Dept: ULTRASOUND IMAGING | Age: 75
Discharge: HOME | End: 2020-09-25
Attending: FAMILY MEDICINE
Payer: MEDICARE

## 2020-09-25 DIAGNOSIS — I73.9 CLAUDICATION (HCC): ICD-10-CM

## 2020-09-25 PROCEDURE — 93925 LOWER EXTREMITY STUDY: CPT

## 2020-10-08 ENCOUNTER — APPOINTMENT (OUTPATIENT)
Dept: PHYSICAL THERAPY | Age: 75
End: 2020-10-08

## 2020-10-12 ENCOUNTER — HOSPITAL ENCOUNTER (OUTPATIENT)
Dept: PHYSICAL THERAPY | Age: 75
Discharge: HOME | End: 2020-10-12

## 2020-10-12 NOTE — PROGRESS NOTES
Kali Saavedra Sr.  : 1945  [place]  [place] [facility], [address] [facility], [address]  [phone]   [phone]      MD Orders: PT referral for vestibular rehab  MEDICAL/REFERRING DIAGNOSIS:  Benign paroxysmal positional vertigo due to bilateral vestibular disorder [H81.13] REFERRING PHYSICIAN: Fouzia Nicolas DO       10/12/2020  Pt left without being seen. O:  Pt comes in today 15 minutes late for initial assessment for orders for BPPV per Dr. Rossy Canas. Pt states he does not have dizziness and he does not know what this dizziness stuff is. He states he has not taken meclizine. He states he has neuropathy in his right hand and both legs and has started taking Lyrica and has not noticed much change. Pt states that Dr. Rossy Canas stated that therapy might help with his neuropathy. This PT called and left a voicemail for Dr. Rossy Canas for new PT as well as OT orders for neuropathy treatment. A:  Pt verbalized understanding that I would call to request new orders and that we would reschedule his assessment when we received those.     P:  Wait for new luci Davalos, PT

## 2020-10-29 ENCOUNTER — HOSPITAL ENCOUNTER (OUTPATIENT)
Dept: MRI IMAGING | Age: 75
Discharge: HOME | End: 2020-10-29
Attending: SURGERY
Payer: MEDICARE

## 2020-10-29 DIAGNOSIS — G89.29 CHRONIC BILATERAL LOW BACK PAIN WITH LEFT-SIDED SCIATICA: ICD-10-CM

## 2020-10-29 DIAGNOSIS — M54.42 CHRONIC BILATERAL LOW BACK PAIN WITH LEFT-SIDED SCIATICA: ICD-10-CM

## 2020-10-29 PROCEDURE — 72148 MRI LUMBAR SPINE W/O DYE: CPT

## 2021-01-26 ENCOUNTER — HOSPITAL ENCOUNTER (OUTPATIENT)
Dept: LAB | Age: 76
Discharge: HOME | End: 2021-01-26
Payer: MEDICARE

## 2021-01-26 DIAGNOSIS — R94.39 ABNORMAL NUCLEAR STRESS TEST: ICD-10-CM

## 2021-01-26 DIAGNOSIS — R06.02 SHORTNESS OF BREATH: ICD-10-CM

## 2021-01-26 LAB
ANION GAP SERPL CALC-SCNC: 2 MMOL/L (ref 7–16)
BASOPHILS # BLD: 0 K/UL (ref 0–0.2)
BASOPHILS NFR BLD: 1 % (ref 0–2)
BUN SERPL-MCNC: 14 MG/DL (ref 8–23)
CALCIUM SERPL-MCNC: 9.4 MG/DL (ref 8.3–10.4)
CHLORIDE SERPL-SCNC: 102 MMOL/L (ref 98–107)
CO2 SERPL-SCNC: 32 MMOL/L (ref 21–32)
CREAT SERPL-MCNC: 0.7 MG/DL (ref 0.8–1.5)
DIFFERENTIAL METHOD BLD: ABNORMAL
EOSINOPHIL # BLD: 0.1 K/UL (ref 0–0.8)
EOSINOPHIL NFR BLD: 2 % (ref 0.5–7.8)
ERYTHROCYTE [DISTWIDTH] IN BLOOD BY AUTOMATED COUNT: 13.7 % (ref 11.9–14.6)
GLUCOSE SERPL-MCNC: 98 MG/DL (ref 65–100)
HCT VFR BLD AUTO: 33.6 % (ref 41.1–50.3)
HGB BLD-MCNC: 10.9 G/DL (ref 13.6–17.2)
IMM GRANULOCYTES # BLD AUTO: 0 K/UL (ref 0–0.5)
IMM GRANULOCYTES NFR BLD AUTO: 1 % (ref 0–5)
LYMPHOCYTES # BLD: 0.8 K/UL (ref 0.5–4.6)
LYMPHOCYTES NFR BLD: 17 % (ref 13–44)
MCH RBC QN AUTO: 30.3 PG (ref 26.1–32.9)
MCHC RBC AUTO-ENTMCNC: 32.4 G/DL (ref 31.4–35)
MCV RBC AUTO: 93.3 FL (ref 79.6–97.8)
MONOCYTES # BLD: 0.8 K/UL (ref 0.1–1.3)
MONOCYTES NFR BLD: 17 % (ref 4–12)
NEUTS SEG # BLD: 2.7 K/UL (ref 1.7–8.2)
NEUTS SEG NFR BLD: 62 % (ref 43–78)
NRBC # BLD: 0 K/UL (ref 0–0.2)
PLATELET # BLD AUTO: 188 K/UL (ref 150–450)
PMV BLD AUTO: 9.3 FL (ref 9.4–12.3)
POTASSIUM SERPL-SCNC: 4.4 MMOL/L (ref 3.5–5.1)
RBC # BLD AUTO: 3.6 M/UL (ref 4.23–5.6)
SODIUM SERPL-SCNC: 136 MMOL/L (ref 136–145)
WBC # BLD AUTO: 4.4 K/UL (ref 4.3–11.1)

## 2021-01-26 PROCEDURE — 80048 BASIC METABOLIC PNL TOTAL CA: CPT

## 2021-01-26 PROCEDURE — 85025 COMPLETE CBC W/AUTO DIFF WBC: CPT

## 2021-01-26 PROCEDURE — 36415 COLL VENOUS BLD VENIPUNCTURE: CPT

## 2021-01-29 ENCOUNTER — HOSPITAL ENCOUNTER (OUTPATIENT)
Dept: CARDIAC CATH/INVASIVE PROCEDURES | Age: 76
Discharge: HOME | End: 2021-01-30
Attending: INTERNAL MEDICINE | Admitting: INTERNAL MEDICINE
Payer: MEDICARE

## 2021-01-29 DIAGNOSIS — I25.700 CORONARY ARTERY DISEASE INVOLVING CORONARY BYPASS GRAFT OF NATIVE HEART WITH UNSTABLE ANGINA PECTORIS (HCC): ICD-10-CM

## 2021-01-29 DIAGNOSIS — I25.10 CAD IN NATIVE ARTERY: Chronic | ICD-10-CM

## 2021-01-29 LAB
ATRIAL RATE: 56 BPM
ATRIAL RATE: 69 BPM
CALCULATED P AXIS, ECG09: 69 DEGREES
CALCULATED R AXIS, ECG10: 42 DEGREES
CALCULATED R AXIS, ECG10: 67 DEGREES
CALCULATED T AXIS, ECG11: 50 DEGREES
CALCULATED T AXIS, ECG11: 59 DEGREES
DIAGNOSIS, 93000: NORMAL
DIAGNOSIS, 93000: NORMAL
P-R INTERVAL, ECG05: 196 MS
Q-T INTERVAL, ECG07: 416 MS
Q-T INTERVAL, ECG07: 436 MS
QRS DURATION, ECG06: 106 MS
QRS DURATION, ECG06: 108 MS
QTC CALCULATION (BEZET), ECG08: 420 MS
QTC CALCULATION (BEZET), ECG08: 455 MS
VENTRICULAR RATE, ECG03: 56 BPM
VENTRICULAR RATE, ECG03: 72 BPM

## 2021-01-29 PROCEDURE — 74011250636 HC RX REV CODE- 250/636: Performed by: INTERNAL MEDICINE

## 2021-01-29 PROCEDURE — 99153 MOD SED SAME PHYS/QHP EA: CPT

## 2021-01-29 PROCEDURE — C1874 STENT, COATED/COV W/DEL SYS: HCPCS

## 2021-01-29 PROCEDURE — 74011250637 HC RX REV CODE- 250/637: Performed by: NURSE PRACTITIONER

## 2021-01-29 PROCEDURE — 93005 ELECTROCARDIOGRAM TRACING: CPT | Performed by: INTERNAL MEDICINE

## 2021-01-29 PROCEDURE — 74011250637 HC RX REV CODE- 250/637: Performed by: INTERNAL MEDICINE

## 2021-01-29 PROCEDURE — C1887 CATHETER, GUIDING: HCPCS

## 2021-01-29 PROCEDURE — 77030016699 HC CATH ANGI DX INFN1 CARD -A

## 2021-01-29 PROCEDURE — C1894 INTRO/SHEATH, NON-LASER: HCPCS

## 2021-01-29 PROCEDURE — 77030013794 HC KT TRNSDUC BLD EDWD -B

## 2021-01-29 PROCEDURE — 74011000250 HC RX REV CODE- 250: Performed by: INTERNAL MEDICINE

## 2021-01-29 PROCEDURE — 99152 MOD SED SAME PHYS/QHP 5/>YRS: CPT

## 2021-01-29 PROCEDURE — 99152 MOD SED SAME PHYS/QHP 5/>YRS: CPT | Performed by: INTERNAL MEDICINE

## 2021-01-29 PROCEDURE — 92928 PRQ TCAT PLMT NTRAC ST 1 LES: CPT | Performed by: INTERNAL MEDICINE

## 2021-01-29 PROCEDURE — 93459 L HRT ART/GRFT ANGIO: CPT | Performed by: INTERNAL MEDICINE

## 2021-01-29 PROCEDURE — 77030004559 HC CATH ANGI DX SUPT CARD -B

## 2021-01-29 PROCEDURE — 77030004558 HC CATH ANGI DX SUPR TORQ CARD -A

## 2021-01-29 PROCEDURE — C1769 GUIDE WIRE: HCPCS

## 2021-01-29 PROCEDURE — 74011000636 HC RX REV CODE- 636: Performed by: INTERNAL MEDICINE

## 2021-01-29 PROCEDURE — 93459 L HRT ART/GRFT ANGIO: CPT

## 2021-01-29 PROCEDURE — 74011000258 HC RX REV CODE- 258: Performed by: INTERNAL MEDICINE

## 2021-01-29 PROCEDURE — 92928 PRQ TCAT PLMT NTRAC ST 1 LES: CPT

## 2021-01-29 PROCEDURE — C1725 CATH, TRANSLUMIN NON-LASER: HCPCS

## 2021-01-29 RX ORDER — MIDAZOLAM HYDROCHLORIDE 1 MG/ML
.5-2 INJECTION, SOLUTION INTRAMUSCULAR; INTRAVENOUS
Status: DISCONTINUED | OUTPATIENT
Start: 2021-01-29 | End: 2021-01-29 | Stop reason: HOSPADM

## 2021-01-29 RX ORDER — PANTOPRAZOLE SODIUM 40 MG/1
40 TABLET, DELAYED RELEASE ORAL
Status: DISCONTINUED | OUTPATIENT
Start: 2021-01-30 | End: 2021-01-29

## 2021-01-29 RX ORDER — ZINC SULFATE 50(220)MG
220 CAPSULE ORAL DAILY
Status: DISCONTINUED | OUTPATIENT
Start: 2021-01-30 | End: 2021-01-30 | Stop reason: HOSPADM

## 2021-01-29 RX ORDER — HEPARIN SODIUM 200 [USP'U]/100ML
3 INJECTION, SOLUTION INTRAVENOUS CONTINUOUS
Status: DISCONTINUED | OUTPATIENT
Start: 2021-01-29 | End: 2021-01-29 | Stop reason: HOSPADM

## 2021-01-29 RX ORDER — LORAZEPAM 1 MG/1
1 TABLET ORAL
Status: DISCONTINUED | OUTPATIENT
Start: 2021-01-29 | End: 2021-01-30 | Stop reason: HOSPADM

## 2021-01-29 RX ORDER — NITROGLYCERIN 0.4 MG/1
0.4 TABLET SUBLINGUAL
Status: DISCONTINUED | OUTPATIENT
Start: 2021-01-29 | End: 2021-01-30 | Stop reason: HOSPADM

## 2021-01-29 RX ORDER — ASCORBIC ACID 500 MG
250 TABLET ORAL DAILY
Status: DISCONTINUED | OUTPATIENT
Start: 2021-01-29 | End: 2021-01-30 | Stop reason: HOSPADM

## 2021-01-29 RX ORDER — FENTANYL CITRATE 50 UG/ML
25-50 INJECTION, SOLUTION INTRAMUSCULAR; INTRAVENOUS
Status: DISCONTINUED | OUTPATIENT
Start: 2021-01-29 | End: 2021-01-29 | Stop reason: HOSPADM

## 2021-01-29 RX ORDER — LISINOPRIL 20 MG/1
20 TABLET ORAL DAILY
Status: DISCONTINUED | OUTPATIENT
Start: 2021-01-29 | End: 2021-01-30 | Stop reason: HOSPADM

## 2021-01-29 RX ORDER — SODIUM CHLORIDE 0.9 % (FLUSH) 0.9 %
5-40 SYRINGE (ML) INJECTION AS NEEDED
Status: DISCONTINUED | OUTPATIENT
Start: 2021-01-29 | End: 2021-01-30 | Stop reason: HOSPADM

## 2021-01-29 RX ORDER — SODIUM CHLORIDE 9 MG/ML
75 INJECTION, SOLUTION INTRAVENOUS CONTINUOUS
Status: DISCONTINUED | OUTPATIENT
Start: 2021-01-29 | End: 2021-01-30 | Stop reason: HOSPADM

## 2021-01-29 RX ORDER — LIDOCAINE HYDROCHLORIDE 10 MG/ML
10-30 INJECTION, SOLUTION EPIDURAL; INFILTRATION; INTRACAUDAL; PERINEURAL ONCE
Status: COMPLETED | OUTPATIENT
Start: 2021-01-29 | End: 2021-01-29

## 2021-01-29 RX ORDER — LANOLIN ALCOHOL/MO/W.PET/CERES
325 CREAM (GRAM) TOPICAL
Status: DISCONTINUED | OUTPATIENT
Start: 2021-01-30 | End: 2021-01-30 | Stop reason: HOSPADM

## 2021-01-29 RX ORDER — PANTOPRAZOLE SODIUM 40 MG/1
40 TABLET, DELAYED RELEASE ORAL
Status: DISCONTINUED | OUTPATIENT
Start: 2021-01-29 | End: 2021-01-30 | Stop reason: HOSPADM

## 2021-01-29 RX ORDER — SODIUM CHLORIDE 0.9 % (FLUSH) 0.9 %
5-40 SYRINGE (ML) INJECTION EVERY 8 HOURS
Status: DISCONTINUED | OUTPATIENT
Start: 2021-01-29 | End: 2021-01-30 | Stop reason: HOSPADM

## 2021-01-29 RX ORDER — TAMSULOSIN HYDROCHLORIDE 0.4 MG/1
0.4 CAPSULE ORAL EVERY 12 HOURS
Status: DISCONTINUED | OUTPATIENT
Start: 2021-01-29 | End: 2021-01-30 | Stop reason: HOSPADM

## 2021-01-29 RX ORDER — GUAIFENESIN 100 MG/5ML
324 LIQUID (ML) ORAL
Status: ACTIVE | OUTPATIENT
Start: 2021-01-29 | End: 2021-01-29

## 2021-01-29 RX ORDER — MAG HYDROX/ALUMINUM HYD/SIMETH 200-200-20
30 SUSPENSION, ORAL (FINAL DOSE FORM) ORAL AS NEEDED
Status: DISCONTINUED | OUTPATIENT
Start: 2021-01-29 | End: 2021-01-29 | Stop reason: HOSPADM

## 2021-01-29 RX ORDER — SULFAMETHOXAZOLE AND TRIMETHOPRIM 800; 160 MG/1; MG/1
1 TABLET ORAL EVERY 12 HOURS
Status: DISCONTINUED | OUTPATIENT
Start: 2021-01-29 | End: 2021-01-30 | Stop reason: HOSPADM

## 2021-01-29 RX ORDER — GUAIFENESIN 100 MG/5ML
81 LIQUID (ML) ORAL DAILY
Status: DISCONTINUED | OUTPATIENT
Start: 2021-01-29 | End: 2021-01-30 | Stop reason: HOSPADM

## 2021-01-29 RX ORDER — CALCIUM CARBONATE/VITAMIN D3 250-3.125
2 TABLET ORAL DAILY
Status: DISCONTINUED | OUTPATIENT
Start: 2021-01-30 | End: 2021-01-30 | Stop reason: HOSPADM

## 2021-01-29 RX ORDER — ASPIRIN 81 MG/1
81 TABLET ORAL DAILY
Status: DISCONTINUED | OUTPATIENT
Start: 2021-01-29 | End: 2021-01-30 | Stop reason: HOSPADM

## 2021-01-29 RX ORDER — DOXAZOSIN 4 MG/1
8 TABLET ORAL 2 TIMES DAILY
Status: DISCONTINUED | OUTPATIENT
Start: 2021-01-29 | End: 2021-01-30 | Stop reason: HOSPADM

## 2021-01-29 RX ORDER — PRAVASTATIN SODIUM 80 MG/1
80 TABLET ORAL
Status: DISCONTINUED | OUTPATIENT
Start: 2021-01-29 | End: 2021-01-30 | Stop reason: HOSPADM

## 2021-01-29 RX ORDER — ACETAMINOPHEN 325 MG/1
650 TABLET ORAL
Status: DISCONTINUED | OUTPATIENT
Start: 2021-01-29 | End: 2021-01-30 | Stop reason: HOSPADM

## 2021-01-29 RX ORDER — METOPROLOL TARTRATE 25 MG/1
12.5 TABLET, FILM COATED ORAL EVERY 12 HOURS
Status: DISCONTINUED | OUTPATIENT
Start: 2021-01-29 | End: 2021-01-30 | Stop reason: HOSPADM

## 2021-01-29 RX ADMIN — IOPAMIDOL 200 ML: 755 INJECTION, SOLUTION INTRAVENOUS at 09:44

## 2021-01-29 RX ADMIN — ACETAMINOPHEN 650 MG: 325 TABLET ORAL at 20:05

## 2021-01-29 RX ADMIN — FENTANYL CITRATE 50 MCG: 50 INJECTION, SOLUTION INTRAMUSCULAR; INTRAVENOUS at 09:17

## 2021-01-29 RX ADMIN — LIDOCAINE HYDROCHLORIDE 15 ML: 10 INJECTION, SOLUTION EPIDURAL; INFILTRATION; INTRACAUDAL; PERINEURAL at 09:14

## 2021-01-29 RX ADMIN — SULFAMETHOXAZOLE AND TRIMETHOPRIM 1 TABLET: 800; 160 TABLET ORAL at 20:00

## 2021-01-29 RX ADMIN — TICAGRELOR 180 MG: 90 TABLET ORAL at 09:40

## 2021-01-29 RX ADMIN — ALUMINUM HYDROXIDE, MAGNESIUM HYDROXIDE, AND SIMETHICONE 30 ML: 200; 200; 20 SUSPENSION ORAL at 09:51

## 2021-01-29 RX ADMIN — PRAVASTATIN SODIUM 80 MG: 80 TABLET ORAL at 22:08

## 2021-01-29 RX ADMIN — HEPARIN SODIUM 3 UNITS/HR: 200 INJECTION, SOLUTION INTRAVENOUS at 08:30

## 2021-01-29 RX ADMIN — LORAZEPAM 1 MG: 1 TABLET ORAL at 22:13

## 2021-01-29 RX ADMIN — SODIUM CHLORIDE 75 ML/HR: 900 INJECTION, SOLUTION INTRAVENOUS at 11:18

## 2021-01-29 RX ADMIN — Medication 10 ML: at 22:11

## 2021-01-29 RX ADMIN — TICAGRELOR 90 MG: 90 TABLET ORAL at 22:08

## 2021-01-29 RX ADMIN — PANTOPRAZOLE SODIUM 40 MG: 40 TABLET, DELAYED RELEASE ORAL at 20:00

## 2021-01-29 RX ADMIN — DOXAZOSIN 8 MG: 4 TABLET ORAL at 18:17

## 2021-01-29 RX ADMIN — SODIUM CHLORIDE 75 ML/HR: 900 INJECTION, SOLUTION INTRAVENOUS at 07:18

## 2021-01-29 RX ADMIN — BIVALIRUDIN 1.75 MG/KG/HR: 250 INJECTION, POWDER, LYOPHILIZED, FOR SOLUTION INTRAVENOUS at 09:09

## 2021-01-29 RX ADMIN — METOPROLOL TARTRATE 12.5 MG: 25 TABLET, FILM COATED ORAL at 20:00

## 2021-01-29 RX ADMIN — MIDAZOLAM 3 MG: 1 INJECTION INTRAMUSCULAR; INTRAVENOUS at 09:16

## 2021-01-29 RX ADMIN — TAMSULOSIN HYDROCHLORIDE 0.4 MG: 0.4 CAPSULE ORAL at 20:00

## 2021-01-29 NOTE — PROGRESS NOTES
[facility] with Dr. Donna Gallagher  Right groin access   Stent of circ x 2   Versed 3mg  Fentanyl 50mcg  Angiomax bolus of 13.5ml given at 09:09  Angiomax drip of 31.5ml/hr started at 09:10  Brilinta 180mg po   Mylanta 30ml po  6F sheath remains in right groin attached to art line. TRANSFER - OUT REPORT:    Verbal report given to cpru RN(name) on Everton Ape. being transferred to cpru(unit) for routine progression of care       Report consisted of patients Situation, Background, Assessment and   Recommendations(SBAR). Information from the following report(s) SBAR, Kardex, Procedure Summary and MAR was reviewed with the receiving nurse. Opportunity for questions and clarification was provided.       Patient transported with:   [employer]

## 2021-01-29 NOTE — PROGRESS NOTES
CM chart reviewed. Patient scheduled for [facility] today with Dr Rebecca Robertson. Pt is in [facility]. No d/c needs identified at this time but will continue to follow. Care Management Interventions  PCP Verified by CM: Yes(Dr. Brant Rod MD)  Mode of Transport at Discharge:  Other (see comment)(Family)  Transition of Care Consult (CM Consult): Discharge Planning  Current Support Network: Family Lives [place], Own Home  Confirm Follow Up Transport: Family  The Plan for Transition of Care is Related to the Following Treatment Goals : Return to baseline  Discharge Location  Discharge Placement: Home

## 2021-01-29 NOTE — ROUTINE PROCESS
Verbal bedside report given to Mary Lou Bustos oncoming RN. Patient's situation, background, assessment and recommendations provided. Opportunity for questions provided. Oncoming RN assumed care of patient.

## 2021-01-29 NOTE — PROCEDURES
[facility]  CARDIAC CATH    Name:  Amy Harrison  MR#:  [id number]  :  1945  ACCOUNT #:  [de-identified]  DATE OF SERVICE:  2021    PROCEDURES PERFORMED:  Left heart catheterization via the right femoral artery with a 6-Kinyarwanda JL4, JR4 and IM catheters. Subselective KATE and LIMA angiography was performed. The patient has extremely tortuous subclavian arteries. Repeat catheterization should be entertained from the left radial artery for better cannulation of the LIMA. The KATE had good proximal and distal anastomosis and the runoff vessel is free of significant disease on the side of the KATE. PREOPERATIVE DIAGNOSES:  Chest pain consistent with coronary artery disease. Anterior ischemia on stress test.  Dyspnea on exertion in an accelerating fashion consistent with [place] class IV angina. POSTOPERATIVE DIAGNOSIS:  Coronary artery disease. SURGEON:  Zachery Wheeler MD    ASSISTANT:  None. ESTIMATED BLOOD LOSS:  15 mL    SPECIMENS REMOVED:  None. COMPLICATIONS:  None. IMPLANTS:  Two 3.0 x 12 [place] drug-eluting stents. ANESTHESIA:  Provided under my direct supervision by Esa Asif RN, beginning at 8:38, concluding at 9:45. A total of 3 mg Versed, 50 mcg fentanyl were given. Vital signs and saturation were stable throughout. FINDINGS:  The left main coronary artery is in normal anatomic position with 20% narrowing. Bifurcates into LAD and circumflex system. The LAD is heavily calcified, subtotally occluded in the midportion of the vessel. There is a proximal diagonal branch free of significant disease. The circumflex is retroflexed. There is a tortuous proximal segment, heavily calcified. There are focal 90% tandem lesions in the midportion of the vessel, 30% distal narrowing, distal moderate-sized obtuse marginal branch. The right coronary artery is a dominant vessel in normal anatomic position.   There is a 99% midvessel narrowing, competitive filling distally from the [address] graft. The KATE is subselectively cannulated. It comes off very high off the subclavian and a tortuous segment of the vessel. There is no significant atherosclerosis seen in the [address] There is good proximal and distal anastomosis and the runoff vessel is free of significant disease. The LIMA, a catheter could not be passed anterograde due to extreme tortuosity of the subclavian. Subselective imaging shows a large patent LIMA with good proximal and distal anastomosis. There appears to be 30% narrowing in the mid segment of the native LAD. The distal vessel appears to be free of any high-grade narrowing. After Angiomax was given, an XB3.5 guide was placed in the left main coronary artery. Yariel wires were placed with two  50s in the distal circumflex. A 2.5 x 15 Trek was used for predilatation of the two lesions with significant difficulty. A 3.0 x 12 John was deployed on the distal mid lesion and the more proximal lesion was also stented with a 3.0 x 12 John drug-eluting stent. The stents were post dilated with 3.0 x 12 NC balloons with inflations to 18 atmospheres. There is 0% residual and ULI 3 flow. Left ventriculogram reveals normal LV systolic function. Ejection fraction is 55% with left ventricular end-diastolic pressure of 18 mmHg with an opening aortic pressure of 118/65 and no gradient across the aortic valve. CONCLUSION:  1. Successful angioplasty and stenting of the two high-grade lesions in the circumflex. 2.  Preserved LV systolic function. 3.  Patent KATE and LIMA grafts. If the patient has continued angina, would recommend a left radial approach to better cannulate the RODRIGUEZ.       MD ALTAGRACIA [id number]  D:  01/29/2021 10:30  T:  01/29/2021 16:06  JOB #:  [id number]

## 2021-01-29 NOTE — PROGRESS NOTES
Patient received to [address] [place] [address] room # 12  Ambulatory from [facility]. Patient scheduled for [facility] today with Dr Anrdea Emmanuel. Procedure reviewed & questions answered, voiced good understanding consent obtained & placed on chart. All medications and medical history reviewed. Will prep patient per orders. Patient & family updated on plan of care. The patient has a fraility score of 3-MANAGING WELL, based on ability to perform ADLs independently. Pt took ASA 81mg x 4 at 0545.

## 2021-01-29 NOTE — PROCEDURES
Brief Cardiac Procedure Note    Patient: Bridgett Fowler Sr. MRN: [id number]  SSN: [id number]    YOB: 1945  Age: 77 y.o. Sex: male      Date of Procedure: 1/29/2021     Pre-procedure Diagnosis: Typical Angina    Post-procedure Diagnosis: Coronary Artery Disease    Reason for Procedure: New Onset Angina < or = 2 Months    Procedure: Left Heart Catheterization    Brief Description of Procedure: lhc via right femoral, manual, gamal lcx times 2    Performed By: Jori Finney MD     Assistants: none    Anesthesia: Moderate Sedation    Estimated Blood Loss: Less than 10 mL      Specimens: None    Implants: None    Findings: gamal lcx times- nl lvef    Complications: None    Recommendations: Continue medical therapy.     Signed By: Jori Finney MD     January 29, 2021

## 2021-01-29 NOTE — PROGRESS NOTES
TRANSFER - OUT REPORT:    Verbal report given to [address] on [facility].  being transferred to Telemetry for routine progression of care       Report consisted of patients Situation, Background, Assessment and   Recommendations(SBAR). Information from the following report(s) SBAR was reviewed with the receiving nurse. Lines:   Peripheral IV 01/29/21 Left Antecubital (Active)       Peripheral IV 01/29/21 Right Wrist (Active)        Opportunity for questions and clarification was provided.       Patient transported with:   Registered Nurse

## 2021-01-29 NOTE — PROGRESS NOTES
Right posterior tibial pulse was +2 and right dorsalis pedis pulse was +2 prior to sheath removal. 6FR arterial sheath removed from right groin using sterile technique at 1230. Manual pressure held over site for 20 minutes. Hemostasis achieved at 1250. Right groin without bleeding without hematoma. Sterile gauze placed over site and secured with tegaderm. 5lb sandbag placed over site. Patient instructed to keep right leg straight and still and head on pillow. Patient verbalizes understanding.   Right posterior tibial pulse was +2 and right dorsalis pedis pulse was +2 after sheath removal.

## 2021-01-30 VITALS
OXYGEN SATURATION: 95 % | WEIGHT: 198.6 LBS | TEMPERATURE: 99.1 F | SYSTOLIC BLOOD PRESSURE: 104 MMHG | DIASTOLIC BLOOD PRESSURE: 63 MMHG | RESPIRATION RATE: 18 BRPM | BODY MASS INDEX: 30.1 KG/M2 | HEIGHT: 68 IN | HEART RATE: 74 BPM

## 2021-01-30 LAB
ANION GAP SERPL CALC-SCNC: 6 MMOL/L (ref 7–16)
ATRIAL RATE: 83 BPM
BASOPHILS # BLD: 0 K/UL (ref 0–0.2)
BASOPHILS NFR BLD: 0 % (ref 0–2)
BUN SERPL-MCNC: 12 MG/DL (ref 8–23)
CALCIUM SERPL-MCNC: 8.6 MG/DL (ref 8.3–10.4)
CALCULATED P AXIS, ECG09: 41 DEGREES
CALCULATED R AXIS, ECG10: 36 DEGREES
CALCULATED T AXIS, ECG11: 19 DEGREES
CHLORIDE SERPL-SCNC: 101 MMOL/L (ref 98–107)
CHOLEST SERPL-MCNC: 147 MG/DL
CO2 SERPL-SCNC: 28 MMOL/L (ref 21–32)
CREAT SERPL-MCNC: 0.68 MG/DL (ref 0.8–1.5)
DIAGNOSIS, 93000: NORMAL
DIFFERENTIAL METHOD BLD: ABNORMAL
EOSINOPHIL # BLD: 0.1 K/UL (ref 0–0.8)
EOSINOPHIL NFR BLD: 2 % (ref 0.5–7.8)
ERYTHROCYTE [DISTWIDTH] IN BLOOD BY AUTOMATED COUNT: 13.9 % (ref 11.9–14.6)
GLUCOSE SERPL-MCNC: 95 MG/DL (ref 65–100)
HCT VFR BLD AUTO: 31.6 % (ref 41.1–50.3)
HDLC SERPL-MCNC: 66 MG/DL (ref 40–60)
HDLC SERPL: 2.2 {RATIO}
HGB BLD-MCNC: 10.3 G/DL (ref 13.6–17.2)
IMM GRANULOCYTES # BLD AUTO: 0 K/UL (ref 0–0.5)
IMM GRANULOCYTES NFR BLD AUTO: 1 % (ref 0–5)
LDLC SERPL CALC-MCNC: 70.2 MG/DL
LIPID PROFILE,FLP: ABNORMAL
LYMPHOCYTES # BLD: 0.5 K/UL (ref 0.5–4.6)
LYMPHOCYTES NFR BLD: 12 % (ref 13–44)
MCH RBC QN AUTO: 29.6 PG (ref 26.1–32.9)
MCHC RBC AUTO-ENTMCNC: 32.6 G/DL (ref 31.4–35)
MCV RBC AUTO: 90.8 FL (ref 79.6–97.8)
MONOCYTES # BLD: 0.6 K/UL (ref 0.1–1.3)
MONOCYTES NFR BLD: 13 % (ref 4–12)
NEUTS SEG # BLD: 3.1 K/UL (ref 1.7–8.2)
NEUTS SEG NFR BLD: 71 % (ref 43–78)
NRBC # BLD: 0 K/UL (ref 0–0.2)
P-R INTERVAL, ECG05: 164 MS
PLATELET # BLD AUTO: 153 K/UL (ref 150–450)
PMV BLD AUTO: 9 FL (ref 9.4–12.3)
POTASSIUM SERPL-SCNC: 4 MMOL/L (ref 3.5–5.1)
Q-T INTERVAL, ECG07: 380 MS
QRS DURATION, ECG06: 94 MS
QTC CALCULATION (BEZET), ECG08: 446 MS
RBC # BLD AUTO: 3.48 M/UL (ref 4.23–5.6)
SODIUM SERPL-SCNC: 135 MMOL/L (ref 138–145)
TRIGL SERPL-MCNC: 54 MG/DL (ref 35–150)
VENTRICULAR RATE, ECG03: 83 BPM
VLDLC SERPL CALC-MCNC: 10.8 MG/DL (ref 6–23)
WBC # BLD AUTO: 4.4 K/UL (ref 4.3–11.1)

## 2021-01-30 PROCEDURE — 90686 IIV4 VACC NO PRSV 0.5 ML IM: CPT | Performed by: INTERNAL MEDICINE

## 2021-01-30 PROCEDURE — 74011250636 HC RX REV CODE- 250/636: Performed by: INTERNAL MEDICINE

## 2021-01-30 PROCEDURE — 93005 ELECTROCARDIOGRAM TRACING: CPT | Performed by: INTERNAL MEDICINE

## 2021-01-30 PROCEDURE — 80048 BASIC METABOLIC PNL TOTAL CA: CPT

## 2021-01-30 PROCEDURE — 85025 COMPLETE CBC W/AUTO DIFF WBC: CPT

## 2021-01-30 PROCEDURE — 74011250637 HC RX REV CODE- 250/637: Performed by: INTERNAL MEDICINE

## 2021-01-30 PROCEDURE — 36415 COLL VENOUS BLD VENIPUNCTURE: CPT

## 2021-01-30 PROCEDURE — 90471 IMMUNIZATION ADMIN: CPT

## 2021-01-30 PROCEDURE — 74011250637 HC RX REV CODE- 250/637: Performed by: NURSE PRACTITIONER

## 2021-01-30 PROCEDURE — 80061 LIPID PANEL: CPT

## 2021-01-30 PROCEDURE — 99212 OFFICE O/P EST SF 10 MIN: CPT | Performed by: INTERNAL MEDICINE

## 2021-01-30 RX ORDER — METOPROLOL TARTRATE 25 MG/1
12.5 TABLET, FILM COATED ORAL EVERY 12 HOURS
Qty: 60 TAB | Refills: 5 | Status: SHIPPED | OUTPATIENT
Start: 2021-01-30 | End: 2021-08-12 | Stop reason: SDUPTHER

## 2021-01-30 RX ORDER — NITROGLYCERIN 0.4 MG/1
0.4 TABLET SUBLINGUAL
Qty: 1 BOTTLE | Refills: 2 | Status: SHIPPED | OUTPATIENT
Start: 2021-01-30

## 2021-01-30 RX ADMIN — LISINOPRIL 20 MG: 20 TABLET ORAL at 08:55

## 2021-01-30 RX ADMIN — OXYCODONE HYDROCHLORIDE AND ACETAMINOPHEN 250 MG: 500 TABLET ORAL at 08:54

## 2021-01-30 RX ADMIN — PANTOPRAZOLE SODIUM 40 MG: 40 TABLET, DELAYED RELEASE ORAL at 06:24

## 2021-01-30 RX ADMIN — INFLUENZA VIRUS VACCINE 0.5 ML: 15; 15; 15; 15 SUSPENSION INTRAMUSCULAR at 10:09

## 2021-01-30 RX ADMIN — Medication 10 ML: at 06:28

## 2021-01-30 RX ADMIN — TAMSULOSIN HYDROCHLORIDE 0.4 MG: 0.4 CAPSULE ORAL at 08:55

## 2021-01-30 RX ADMIN — ASPIRIN 81 MG: 81 TABLET, CHEWABLE ORAL at 08:53

## 2021-01-30 RX ADMIN — DOXAZOSIN 8 MG: 4 TABLET ORAL at 08:52

## 2021-01-30 RX ADMIN — TICAGRELOR 90 MG: 90 TABLET ORAL at 08:55

## 2021-01-30 RX ADMIN — METOPROLOL TARTRATE 12.5 MG: 25 TABLET, FILM COATED ORAL at 08:53

## 2021-01-30 RX ADMIN — MULTIPLE VITAMINS W/ MINERALS TAB 1 TABLET: TAB at 08:53

## 2021-01-30 RX ADMIN — SULFAMETHOXAZOLE AND TRIMETHOPRIM 1 TABLET: 800; 160 TABLET ORAL at 08:54

## 2021-01-30 RX ADMIN — CALCIUM CARBONATE-CHOLECALCIFEROL TAB 250 MG-125 UNIT 2 TABLET: 250-125 TAB at 08:53

## 2021-01-30 RX ADMIN — FERROUS SULFATE TAB 325 MG (65 MG ELEMENTAL FE) 325 MG: 325 (65 FE) TAB at 08:54

## 2021-01-30 NOTE — DISCHARGE SUMMARY
[facility] Cardiology Discharge Summary     Patient ID:  Syl Pate  [id number]  77 y.o.  1945    Admit date: 1/29/2021    Discharge date:  1/30/2021    Admitting Physician: Leonardo Mitchell MD     Discharge Physician: Dr. Carolann Cooper    Admission Diagnoses: Paroxysmal atrial fibrillation [I48.0]  CAD (coronary artery disease) [I25.10]    Discharge Diagnoses:    Diagnosis    CAD (coronary artery disease)    Gastroesophageal reflux disease with esophagitis    Aortic valve sclerosis    Hx of CABG x2    Pure hypercholesterolemia    Essential hypertension with goal blood pressure less than 140/90    Prostate Cancer    Arthritis    Elevated hemidiaphragm    CAD in native artery       Cardiology Procedures this admission:  Left heart catheterization with PCI  Consults: None    Hospital Course: Patient was seen at the office of [facility] Cardiology by Dr. Yadira Bullard for complaints of fatigue, SOB and abnormal NST and was subsequently scheduled for a LHC at [facility] on 1/29/21. Patient underwent cardiac catheterization by Dr. Maya Cerda. Patient was found to have tandem lesions with 90% stenosis of the mLCx that was stented with a 3.0 x 12 John LIZZY and a  3.0 x 12 [place] LIZZY with 0% residual stenosis and ULI 3 flow. Patent KATE and LIMA grafts. Patient tolerated the procedure well and was taken to the telemetry floor for recovery. Of note: The patient has extremely tortuous subclavian arteries. Repeat catheterization should be entertained from the left radial artery for better cannulation of the LIMA. Subselective imaging shows a large patent LIMA with good proximal and distal anastomosis. The KATE had good proximal and distal anastomosis and the runoff vessel is free of significant disease on the side of the KATE. The following morning patient was up feeling well without any complaints of chest pain or shortness of breath.  Patient's RFA cath site was clean, dry and intact without hematoma or bruit. Patient's labs were stable. Patient was seen and examined by Dr. Riya Kapoor and determined stable and ready for discharge. Patient was instructed on the importance of medication compliance including taking Aspirin and Brilinta everyday without missing a dose. After receiving drug eluting stents, the patient will remain on dual anti-platelet therapy for 1 year. For maximized medical therapy for CAD, patient will continue BB, ACE-I, and statin as well. The patient will follow up with [facility] Cardiology Dr. Kasia Hammer on 2/23/21 @ 8:45AM in the [place] office and has been referred to cardiac rehab. DISPOSITION: The patient is being discharged home in stable condition on a low saturated fat, low cholesterol and low salt diet. The patient is instructed to advance activities as tolerated to the limit of fatigue or shortness of breath. The patient is instructed to avoid all heavy lifting, straining, stooping or squatting for 3-5 days. The patient is instructed to watch the cath site for bleeding/oozing; if seen, the patient is instructed to apply firm pressure with a clean cloth and call [facility]iology at [phone]. The patient is instructed to watch for signs of infection which include: increasing area of redness, fever/hot to touch or purulent drainage at the catheterization site. The patient is instructed not to soak in a bathtub for 7-10 days, but is cleared to shower. The patient is instructed to call the office or return to the ER for immediate evaluation for any shortness of breath or chest pain not relieved by NTG. Discharge Exam:   Visit Vitals  /63   Pulse 74   Temp 99.1 °F (37.3 °C)   Resp 18   Ht 5' 8\" (1.727 m)   Wt 90.1 kg (198 lb 9.6 oz)   SpO2 95%   BMI 30.20 kg/m²         Patient has been seen by Dr. Riya Kapoor: see his progress note for exam details.     Recent Results (from the past 24 hour(s))   EKG, 12 LEAD, INITIAL    Collection Time: 01/29/21  1:57 PM   Result Value Ref Range Ventricular Rate 72 BPM    Atrial Rate 69 BPM    QRS Duration 108 ms    Q-T Interval 416 ms    QTC Calculation (Bezet) 455 ms    Calculated R Axis 67 degrees    Calculated T Axis 59 degrees    Diagnosis       nsr with pacs  Incomplete right bundle branch block  Nonspecific T wave abnormality  Abnormal ECG  When compared with ECG of 29-JAN-2021 07:29,  Nonspecific T wave abnormality now evident in Anterior leads  Confirmed by [facility]  MD (VAHID)YESICA ([id number]) on 1/29/2021 1:25:55 PM     METABOLIC PANEL, BASIC    Collection Time: 01/30/21  4:26 AM   Result Value Ref Range    Sodium 135 (L) 138 - 145 mmol/L    Potassium 4.0 3.5 - 5.1 mmol/L    Chloride 101 98 - 107 mmol/L    CO2 28 21 - 32 mmol/L    Anion gap 6 (L) 7 - 16 mmol/L    Glucose 95 65 - 100 mg/dL    BUN 12 8 - 23 MG/DL    Creatinine 0.68 (L) 0.8 - 1.5 MG/DL    GFR est AA >60 >60 ml/min/1.73m2    GFR est non-AA >60 >60 ml/min/1.73m2    Calcium 8.6 8.3 - 10.4 MG/DL   LIPID PANEL    Collection Time: 01/30/21  4:26 AM   Result Value Ref Range    LIPID PROFILE          Cholesterol, total 147 <200 MG/DL    Triglyceride 54 35 - 150 MG/DL    HDL Cholesterol 66 (H) 40 - 60 MG/DL    LDL, calculated 70.2 <100 MG/DL    VLDL, calculated 10.8 6.0 - 23.0 MG/DL    CHOL/HDL Ratio 2.2     CBC WITH AUTOMATED DIFF    Collection Time: 01/30/21  4:26 AM   Result Value Ref Range    WBC 4.4 4.3 - 11.1 K/uL    RBC 3.48 (L) 4.23 - 5.6 M/uL    HGB 10.3 (L) 13.6 - 17.2 g/dL    HCT 31.6 (L) 41.1 - 50.3 %    MCV 90.8 79.6 - 97.8 FL    MCH 29.6 26.1 - 32.9 PG    MCHC 32.6 31.4 - 35.0 g/dL    RDW 13.9 11.9 - 14.6 %    PLATELET 862 907 - 910 K/uL    MPV 9.0 (L) 9.4 - 12.3 FL    ABSOLUTE NRBC 0.00 0.0 - 0.2 K/uL    DF AUTOMATED      NEUTROPHILS 71 43 - 78 %    LYMPHOCYTES 12 (L) 13 - 44 %    MONOCYTES 13 (H) 4.0 - 12.0 %    EOSINOPHILS 2 0.5 - 7.8 %    BASOPHILS 0 0.0 - 2.0 %    IMMATURE GRANULOCYTES 1 0.0 - 5.0 %    ABS. NEUTROPHILS 3.1 1.7 - 8.2 K/UL    ABS.  LYMPHOCYTES 0.5 0.5 - 4.6 K/UL    ABS. MONOCYTES 0.6 0.1 - 1.3 K/UL    ABS. EOSINOPHILS 0.1 0.0 - 0.8 K/UL    ABS. BASOPHILS 0.0 0.0 - 0.2 K/UL    ABS. IMM. GRANS. 0.0 0.0 - 0.5 K/UL         Patient Instructions:     Current Discharge Medication List      START taking these medications    Details   nitroglycerin (NITROSTAT) 0.4 mg SL tablet 1 Tab by SubLINGual route every five (5) minutes as needed for Chest Pain. Up to 3 doses. Qty: 1 Bottle, Refills: 2      ticagrelor (BRILINTA) 90 mg tablet Take 1 Tab by mouth every twelve (12) hours every twelve (12) hours. Qty: 60 Tab, Refills: 11         CONTINUE these medications which have CHANGED    Details   metoprolol tartrate (LOPRESSOR) 25 mg tablet Take 0.5 Tabs by mouth every twelve (12) hours. Qty: 60 Tab, Refills: 5         CONTINUE these medications which have NOT CHANGED    Details   trimethoprim-sulfamethoxazole (Bactrim DS) 160-800 mg per tablet Take 1 Tab by mouth two (2) times a day for 7 days. Qty: 14 Tab, Refills: 0    Associated Diagnoses: Acute cystitis with hematuria      pravastatin (PRAVACHOL) 80 mg tablet Take 1 Tab by mouth nightly. Qty: 90 Tab, Refills: 3      zinc 50 mg tab tablet Take  by mouth daily. doxazosin (CARDURA) 8 mg tablet Take 1 Tab by mouth two (2) times a day. Qty: 180 Tab, Refills: 3      omeprazole (PRILOSEC) 20 mg capsule TAKE 1 CAPSULE TWO TIMES A DAY FOR GASTROESOPHAGEAL REFLUX  Qty: 180 Cap, Refills: 3      lisinopriL (PRINIVIL, ZESTRIL) 20 mg tablet TAKE 1 TABLET TWICE DAILY FOR HYPERTENSION  Qty: 180 Tab, Refills: 3    Associated Diagnoses: Essential hypertension with goal blood pressure less than 140/90      aspirin delayed-release 81 mg tablet Take 1 Tab by mouth daily. Qty: 100 Tab, Refills: 3      ascorbic acid, vitamin C, (Vitamin C) 500 mg tablet Take  by mouth. tamsulosin (FLOMAX) 0.4 mg capsule Take 0.4 mg by mouth two (2) times a day.       Ca-D3-mag ox-zinc--perlita-bor (CALCIUM 600-D3 PLUS) 600 mg calcium- 800 unit-50 mg tab Take 1 Tab by mouth daily. ferrous sulfate 325 mg (65 mg iron) tablet Take 325 mg by mouth Daily (before breakfast). Fish Oil-Omega-3 Fatty Acids (FISH OIL OMEGA 3-6-9) 300-1,000 mg cpDR Take 2 Tabs by mouth every morning. Indications: last dose 3/7/18      MULTIVITS-MINERALS/FA/LYCOPENE (ONE-A-DAY MEN'S PO) Take 1 Tab by mouth daily.  Indications: last dose 3/7/18

## 2021-01-30 NOTE — DISCHARGE INSTRUCTIONS
The patient is being discharged home in stable condition on a low saturated fat, low cholesterol and low salt diet. The patient is instructed to advance activities as tolerated to the limit of fatigue or shortness of breath. The patient is instructed to avoid all heavy lifting, straining, stooping or squatting for 3-5 days. The patient is instructed to watch the cath site for bleeding/oozing; if seen, the patient is instructed to apply firm pressure with a clean cloth and call [facility] at [phone]. The patient is instructed to watch for signs of infection which include: increasing area of redness, fever/hot to touch or purulent drainage at the catheterization site. The patient is instructed not to soak in a bathtub for 7-10 days, but is cleared to shower. The patient is instructed to call the office or return to the ER for immediate evaluation for any shortness of breath or chest pain not relieved by NTG.    Patient Education   No lifting of 10 lbs or more for 7 days, no tub baths for a week, may shower, no creams, lotions powders, or ointments to site for a week. Coronary Angioplasty: [facility] at [facility]     Coronary angioplasty is a procedure that is used to open a narrowed or blocked coronary artery. It may also be called a percutaneous coronary intervention (PCI). The doctor opened your narrowed or blocked artery by putting a thin tube, called a catheter, into your heart through a blood vessel. The catheter was inserted into the blood vessel in your groin or arm. Your groin or arm may have a bruise and feel sore for a day or two after the procedure. You can do light activities around the house. But don't do anything strenuous for several days. This care sheet gives you a general idea about how long it will take for you to recover. But each person recovers at a different pace. Follow the steps below to get better as quickly as possible.   How can you care for yourself at home?  Activity    · If the doctor gave you a sedative:  ? For 24 hours, don't do anything that requires attention to detail, such as going to work, making important decisions, or signing any legal documents. It takes time for the medicine's effects to completely wear off.  ? For your safety, do not drive or operate any machinery that could be dangerous. Wait until the medicine wears off and you can think clearly and react easily.     · Do not do strenuous exercise and do not lift, pull, or push anything heavy until your doctor says it is okay. This may be for a day or two. You can walk around the house and do light activity, such as cooking.     · If the catheter was placed in your groin, try not to walk up stairs for the first couple of days.     · If the catheter was placed in your arm near your wrist, do not bend your wrist deeply for the first couple of days. Be careful using your hand to get into and out of a chair or bed.     · Carry your stent identification card with you at all times.     · If your doctor recommends it, get more exercise. Walking is a good choice. Bit by bit, increase the amount you walk every day. Try for at least 30 minutes on most days of the week. Diet    · Drink plenty of fluids to help your body flush out the dye. If you have kidney, heart, or liver disease and have to limit fluids, talk with your doctor before you increase the amount of fluids you drink.     · Keep eating a heart-healthy diet that has lots of fruits, vegetables, and whole grains. If you have not been eating this way, talk to your doctor. You also may want to talk to a dietitian. This expert can help you to learn about healthy foods and plan meals. Medicines    · Your doctor will tell you if and when you can restart your medicines. He or she will also give you instructions about taking any new medicines.     · If you take aspirin or some other blood thinner, ask your doctor if and when to start taking it again. Make sure that you understand exactly what your doctor wants you to do.     · Your doctor will prescribe blood-thinning medicines. You will likely take aspirin plus another antiplatelet, such as clopidogrel (Plavix). It is very important that you take these medicines exactly as directed. These medicines help keep the coronary artery open and reduce your risk of a heart attack.     · Call your doctor if you think you are having a problem with your medicine. Care of the catheter site    · For 1 or 2 days, keep a bandage over the spot where the catheter was inserted. The bandage probably will fall off in this time.     · Put ice or a cold pack on the area for 10 to 20 minutes at a time to help with soreness or swelling. Put a thin cloth between the ice and your skin.     · You may shower 24 to 48 hours after the procedure, if your doctor okays it. Pat the incision dry.     · Do not soak the catheter site until it is healed. Don't take a bath for 1 week, or until your doctor tells you it is okay.     · Watch for bleeding from the site. A small amount of blood (up to the size of a quarter) on the bandage can be normal.     · If you are bleeding, lie down and press on the area for 15 minutes to try to make it stop. If the bleeding does not stop, call your doctor or seek immediate medical care. Follow-up care is a key part of your treatment and safety. Be sure to make and go to all appointments, and call your doctor if you are having problems. It's also a good idea to know your test results and keep a list of the medicines you take. When should you call for help? Call 911 anytime you think you may need emergency care. For example, call if:    · You passed out (lost consciousness).     · You have severe trouble breathing.     · You have sudden chest pain and shortness of breath, or you cough up blood.     · You have symptoms of a heart attack, such as:  ? Chest pain or pressure. ? Sweating. ?  Shortness of breath. ? Nausea or vomiting. ? Pain that spreads from the chest to the neck, jaw, or one or both shoulders or arms. ? Dizziness or lightheadedness. ? A fast or uneven pulse. After calling 911, chew 1 adult-strength aspirin. Wait for an ambulance. Do not try to drive yourself.     · You have been diagnosed with angina, and you have angina symptoms that do not go away with rest or are not getting better within 5 minutes after you take one dose of nitroglycerin. Call your doctor now or seek immediate medical care if:    · You are bleeding from the area where the catheter was put in your artery.     · You have a fast-growing, painful lump at the catheter site.     · You have signs of infection, such as:  ? Increased pain, swelling, warmth, or redness. ? Red streaks leading from the catheter site. ? Pus draining from the catheter site. ? A fever.     · Your leg, arm, or hand is painful, looks blue, or feels cold, numb, or tingly. Watch closely for changes in your health, and be sure to contact your doctor if you have any problems. Where can you learn more? Go to http://www.gray.com/  Enter L064 in the search box to learn more about \"Coronary Angioplasty: What to Expect at Home. \"  Current as of: December 16, 2019               Content Version: 12.6  © 8644-8248 [employer]. Care instructions adapted under license by [employer] (which disclaims liability or warranty for this information). If you have questions about a medical condition or this instruction, always ask your healthcare professional. [place] [address] any warranty or liability for your use of this information. Patient Education        Coronary Artery Disease: Care Instructions  Your Care Instructions     The heart is a muscle, and like any muscle, it needs blood to work well.  Coronary artery disease occurs when the arteries that bring oxygen-rich blood to your heart have a buildup of plaque--deposits of fats and other substances. Plaque can reduce blood flow to the heart muscle. This can cause angina symptoms such as chest pain or pressure. A heart attack can happen if blood flow is completely blocked. You can do a lot to improve your health and prevent a heart attack. Eating healthy food, not smoking, getting regular exercise, and taking your medicine are the main things you can do every day to stay healthy. Follow-up care is a key part of your treatment and safety. Be sure to make and go to all appointments, and call your doctor if you are having problems. It's also a good idea to know your test results and keep a list of the medicines you take. How can you care for yourself at home? Medicines    · Be safe with medicines. Take your medicines exactly as prescribed. Call your doctor if you think you are having a problem with your medicine. You will get more details on the specific medicines your doctor prescribes.     · You will take medicines that lower your risk of a heart attack and lower your risk of dying early from heart disease. These medicines include:  ? Angiotensin-converting enzyme (ACE) inhibitors or angiotensin II receptor blockers (ARBs). They lower blood pressure. ? Aspirin and other blood thinners. They prevent blood clots that could cause a heart attack. ? Beta-blockers. They lower the heart's workload. ? Statins and other cholesterol medicines. They lower cholesterol.     · If your doctor has given you nitroglycerin for angina symptoms (such as chest pain or pressure) keep it with you at all times. If you have symptoms, sit down and rest, and take the first dose of nitroglycerin as directed. If your symptoms get worse or are not getting better within 5 minutes, call 911 right away. Stay on the phone.  The emergency  will give you further instructions.     · Do not take any over-the-counter medicines, vitamins, or herbal products without talking to your doctor first.   Lifestyle  Ask your doctor if a cardiac rehab program is right for you. Cardiac rehab can help you make lifestyle changes. In cardiac rehab, a team of health professionals provides education and support to help you make new, healthy habits.    · Do not smoke. Avoid secondhand smoke too. Smoking can increase your risk of a heart attack or stroke. If you need help quitting, talk to your doctor about stop-smoking programs and medicines. These can increase your chances of quitting for good.     · Eat heart-healthy foods. These include vegetables, fruits, nuts, beans, lean meat, fish, and whole grains. Limit saturated fat, sodium, and alcohol. Limit drinks and foods with added sugar.     · If your doctor recommends it, get more exercise. Ask your doctor what level of exercise is safe for you. Walking is a good choice. Bit by bit, increase the amount you walk every day. Try for at least 30 minutes on most days of the week. You also may want to swim, bike, or do other activities.     · Stay at a healthy weight. Lose weight if you need to.     · Manage other health problems. These include diabetes, high blood pressure, and high cholesterol. If you think you may have a problem with alcohol or drug use, talk to your doctor.     · If you have angina symptoms, pay attention to your symptoms. This can help you see what causes them and what is typical for you.     · Avoid colds and flu. Get a pneumococcal vaccine shot. If you have had one before, ask your doctor whether you need another dose. Get a flu vaccine every year. If you must be around people with colds or flu, wash your hands often.     · If you think you have symptoms of depression, talk to your doctor. Symptoms include feeling sad or hopeless most of the time, or losing interest in activities that used to make you happy. When should you call for help? Call 911 anytime you think you may need emergency care.  For example, call if:    · You have symptoms of a heart attack. These may include:  ? Chest pain or pressure, or a strange feeling in the chest.  ? Sweating. ? Shortness of breath. ? Nausea or vomiting. ? Pain, pressure, or a strange feeling in the back, neck, jaw, or upper belly or in one or both shoulders or arms. ? Lightheadedness or sudden weakness. ? A fast or irregular heartbeat. After you call 911, the  may tell you to chew 1 adult-strength or 2 to 4 low-dose aspirin. Wait for an ambulance. Do not try to drive yourself.     · You have angina symptoms (such as chest pain or pressure) that do not go away with rest or are not getting better within 5 minutes after you take a dose of nitroglycerin.     · You passed out (lost consciousness). Call your doctor now or seek immediate medical care if:    · You are having angina symptoms, such as chest pain or pressure, more often than usual, or they are different or worse than usual.     · You have new or increased shortness of breath.     · You are dizzy or lightheaded, or you feel like you may faint. Watch closely for changes in your health, and be sure to contact your doctor if you have any problems. Where can you learn more? Go to http://www.gray.com/  Enter J653 in the search box to learn more about \"Coronary Artery Disease: Care Instructions. \"  Current as of: December 16, 2019               Content Version: 12.6  © 3506-3225 [employer] Care instructions adapted under license by [employer] (which disclaims liability or warranty for this information). If you have questions about a medical condition or this instruction, always ask your healthcare professional. [place] [address] any warranty or liability for your use of this information. Patient Education   Ticagrelor (By mouth)   Ticagrelor (idg-LL-iwlk-or)  Helps prevent stroke, heart attack, and other heart problems. This medicine is a blood thinner.    Brand Name(s): Bociararemy   There may be other brand names for this medicine. When This Medicine Should Not Be Used: This medicine is not right for everyone. Do not use it if you had an allergic reaction to ticagrelor, or if you have bleeding problems (such as a bleeding stomach ulcer) or a history of bleeding in your brain. How to Use This Medicine:   Tablet  · Your doctor will tell you how much medicine to use. Do not use more than directed. Take this medicine at the same time every day. · Your doctor may tell you to take aspirin with this medicine. Do not use more than 100 milligrams of aspirin per day. Check the labels of other medicines to make sure they do not contain aspirin. · If you cannot swallow the tablet, you may do this:   ¨ Crush the tablet and mix it in a glass of water. Drink it right away. Rinse the glass with more water and drink that too, so you get all the medicine. ¨ You may give the tablet and water mixture through a nasogastric tube. Flush the tube with more water so you receive all the medicine. · This medicine should come with a Medication Guide. Ask your pharmacist for a copy if you do not have one. · Missed dose: Skip the missed dose and take your next dose as usual. Do not take extra medicine to make up for a missed dose. · Store the medicine in a closed container at room temperature, away from heat, moisture, and direct light. Drugs and Foods to Avoid:   Ask your doctor or pharmacist before using any other medicine, including over-the-counter medicines, vitamins, and herbal products. · Some medicines can affect how ticagrelor works.  Tell your doctor if you are using any of the following:  ¨ Atazanavir, carbamazepine, clarithromycin, digoxin, indinavir, itraconazole, ketoconazole, lovastatin, nefazodone, nelfinavir, phenobarbital, phenytoin, rifampin, ritonavir, saquinavir, simvastatin, telithromycin, or voriconazole  ¨ Blood thinner (including warfarin or heparin)  ¨ NSAID pain or arthritis medicine (including celecoxib, diclofenac, ibuprofen, naproxen)  Warnings While Using This Medicine:   · Tell your doctor if you are pregnant or breastfeeding, or if you have liver disease, heart rhythm problems (including slow heartbeat), lung or breathing problems (such as asthma or COPD), or a history of bleeding problems. · This medicine may cause you to bleed and bruise more easily, and it may take longer than usual for bleeding to stop. Be careful to avoid injuries. · Do not stop using this medicine unless your doctor tells you to. To stop it may increase your risk of a heart attack, blood clot, or other serious problem. · Tell any doctor or dentist who treats you that you are using this medicine. With your doctor's permission, you may need to stop using this medicine several days before you have surgery to reduce the risk of bleeding problems. Follow your doctor's instructions carefully. · Your doctor will do lab tests at regular visits to check on the effects of this medicine. Keep all appointments. · Keep all medicine out of the reach of children. Never share your medicine with anyone. Possible Side Effects While Using This Medicine:   Call your doctor right away if you notice any of these side effects:  · Allergic reaction: Itching or hives, swelling in your face or hands, swelling or tingling in your mouth or throat, chest tightness, trouble breathing  · Bloody or black, tarry stools, red or dark brown urine  · Fast, slow, or pounding heartbeat  · Trouble breathing  · Unusual bleeding, bruising, or weakness  · Vomiting of blood or material that looks like coffee grounds  If you notice other side effects that you think are caused by this medicine, tell your doctor. Call your doctor for medical advice about side effects.  You may report side effects to FDA at 3-406-FDA-9984  © 2017 [facility] Information is for End User's use only and may not be sold, redistributed or otherwise used for commercial purposes. The above information is an  only. It is not intended as medical advice for individual conditions or treatments. Talk to your doctor, nurse or pharmacist before following any medical regimen to see if it is safe and effective for you. Patient Education   Nitroglycerin, Rapid Release (By mouth)   Nitroglycerin (eyv-igax-ZTPQ-er-in)  Treats or prevents angina (chest pain). This medicine is a nitrate. Brand Name(s): NitroMist, Nitroglycerin Lingual Aerosol, Nitrolingual, Nitrostat   There may be other brand names for this medicine. When This Medicine Should Not Be Used: This medicine is not right for everyone. Do not use it if you had an allergic reaction to nitroglycerin or similar medicines. How to Use This Medicine:   Powder, Spray, Tablet  · Your doctor will tell you how much medicine to use. Do not use more than directed. Sit down before you take this medicine, because it could make you lightheaded. · You may use this medicine 5 to 10 minutes before an activity that can cause angina. This may help prevent the attack. · Read and follow the patient instructions that come with this medicine. Talk to your doctor or pharmacist if you have any questions. · Powder:   ¨ Empty the contents of a packet under your tongue. Close your mouth and breathe normally. Allow powder to dissolve without swallowing. Do not rinse or spit for 5 minutes after taking this medicine. Do not take more than 3 packets in 15 minutes. If you still have pain after you take a total of 3 packets, this is an emergency. Call 911. Do not drive yourself to the hospital.  ¨ Store the powder at room temperature, away from heat, moisture, and direct light. · Tablets:   ¨ Wet the tablet with saliva and place it under your tongue or inside your cheek. Let the tablet dissolve. Do not chew, crush, or swallow the tablet. Wait 5 minutes. If you still have pain, take a second tablet.  Do not take more than 3 tablets in 15 minutes. If you still have pain after you take a total of 3 tablets, this is an emergency. Call 911. Do not drive yourself to the hospital.  ¨ Store the tablets at room temperature in the original container, away from heat, moisture, and direct light. · Spray:   ¨ To prime the spray before you use it for the first time:   § Point the Nitrolingual® Pumpspray bottle away from your face. Pump the spray 5 times. The medicine is now ready to use. § If you do not use the medicine for 6 weeks, pump the spray 1 time to re-prime the bottle. § If you do not use the medicine for 3 months, pump the spray 5 times to re-prime the bottle. § Point the [employer] away from your face. Pump the spray 10 times. The medicine is now ready to use. § If you do not use the medicine for 6 weeks, pump the spray 2 times to re-prime the bottle. ¨ To use the spray:   § Hold the bottle upright and close to your mouth. Open your mouth and spray the medicine onto or under your tongue. Close your mouth right away. Do not inhale the spray or get it in your eyes. Do not rinse your mouth for at least 5 to 10 minutes. § Wait 5 minutes. If you still have pain, use another spray. Do not use more than 3 sprays within 15 minutes. § If you still have pain after you use a total of 3 sprays, this is an emergency. Call 911. Do not drive yourself to the hospital.  ¨ Do not shake the bottle. Do not use the spray near heat, open flame, or while smoking. ¨ Check the fluid level regularly according to the medicine directions. Refill your prescription before the level falls too low. ¨ Store the spray in an upright position at room temperature, away from heat. Do not open or burn the spray container. Drugs and Foods to Avoid:   Ask your doctor or pharmacist before using any other medicine, including over-the-counter medicines, vitamins, and herbal products.   · Do not use this medicine if you are also using avanafil, riociguat, sildenafil, tadalafil, or vardenafil. · Some foods and medicines can affect how nitroglycerin works. Tell your doctor if you are using any of the following:  ¨ Alteplase, aspirin, heparin  ¨ Blood pressure medicines  ¨ Diuretic (water pill)  ¨ Ergot medicines  · Tell your doctor if you are using any medicine that makes your mouth dry, such as medicines that treat depression. Warnings While Using This Medicine:   · Tell your doctor if you are pregnant or breastfeeding, or if you have kidney disease, anemia, low blood pressure, heart failure, heart disease, an enlarged heart, or a recent heart attack or stroke. Tell your doctor if you have a history of a head injury. · This medicine may cause severe low blood pressure. This can make you dizzy or lightheaded. Do not drive or do anything else that could be dangerous until you know how this medicine affects you. These symptoms may be worse if you drink alcohol. · Medicine that treats chest pain sometimes causes headaches when you first start using it. This is normal. Do not stop using the medicine to avoid headaches. Ask your doctor if you can take aspirin or acetaminophen to treat the headache. · Keep all medicine out of the reach of children. Never share your medicine with anyone.   Possible Side Effects While Using This Medicine:   Call your doctor right away if you notice any of these side effects:  · Allergic reaction: Itching or hives, swelling in your face or hands, swelling or tingling in your mouth or throat, chest tightness, trouble breathing  · Blurred vision  · Increased chest pain, fast or slow heartbeat  · Severe or ongoing dizziness, lightheadedness, or fainting  · Throbbing, severe, or ongoing headache, confusion, low fever, vision problems  · Trouble breathing, cold sweat, blue skin, lips, or nails  · Warmth or redness in your face, neck, arms, or upper chest  If you notice these less serious side effects, talk with your doctor:   · Dry mouth  · Nausea or vomiting  · Numbness or tingling in your hands, ankles, or feet  If you notice other side effects that you think are caused by this medicine, tell your doctor. Call your doctor for medical advice about side effects. You may report side effects to FDA at 4-157-VBF-5043  © 2017 [facility] [place][note 2] Information is for End User's use only and may not be sold, redistributed or otherwise used for commercial purposes. The above information is an  only. It is not intended as medical advice for individual conditions or treatments. Talk to your doctor, nurse or pharmacist before following any medical regimen to see if it is safe and effective for you.

## 2021-01-30 NOTE — PROGRESS NOTES
Pt is for discharge home today with no needs/supportive care orders recieved for CM at this time. Pt is to follow up with [facility] Out Pt Cardiac Rehab. Care Management Interventions  PCP Verified by CM: Yes(Dr. Kayla Wilhelm MD)  Mode of Transport at Discharge:  Other (see comment)(Family)  Transition of Care Consult (CM Consult): Discharge Planning  Current Support Network: Family Lives [place], Own Home  Confirm Follow Up Transport: Family  The Plan for Transition of Care is Related to the Following Treatment Goals : Return to baseline  Discharge Location  Discharge Placement: Home

## 2021-01-30 NOTE — ROUTINE PROCESS
Bedside shift change report given to Romaine Sandesr (oncoming nurse) by Sasha Pena RN (offgoing nurse). Report included the following information SBAR, Kardex, Procedure Summary, MAR, Recent Results, Med Rec Status and Cardiac Rhythm NSR with PVC.

## 2021-03-02 ENCOUNTER — HOSPITAL ENCOUNTER (OUTPATIENT)
Dept: CARDIAC REHAB | Age: 76
Discharge: HOME | End: 2021-03-02
Payer: MEDICARE

## 2021-03-02 NOTE — CARDIO/PULMONARY
Dear Dr. Zachary Farooq: Thank you for referring your patient, Digna Dennison Sr. (: 1945), to the Cardiopulmonary Rehabilitation Program at [facility].  Mr. Estela Richardson is a good candidate for the Cardiac Rehab Program and should see improvements with regular participation. We will be addressing appropriate interventions for modifiable risk factors with your patient during the next 12 weeks. We will contact you with any issues or concerns that may arise, or you can follow your patients progress through [address] [place] [address] at any time. A final summary will be available on [facility] when the program is completed. Again, thank you for your referral. If we can be of further assistance, please feel free to contact the Cardiopulmonary Rehab staff at [phone]. Sincerely, JEFF Gan, RN Cardiopulmonary Rehabilitation Nurse [facility]

## 2021-03-04 ENCOUNTER — HOSPITAL ENCOUNTER (OUTPATIENT)
Dept: CARDIAC REHAB | Age: 76
Discharge: HOME | End: 2021-03-04
Payer: MEDICARE

## 2021-03-04 VITALS — HEIGHT: 68 IN | BODY MASS INDEX: 30.16 KG/M2 | WEIGHT: 199 LBS

## 2021-03-04 PROCEDURE — 93798 PHYS/QHP OP CAR RHAB W/ECG: CPT

## 2021-03-05 ENCOUNTER — HOSPITAL ENCOUNTER (OUTPATIENT)
Dept: CARDIAC REHAB | Age: 76
Discharge: HOME | End: 2021-03-05
Payer: MEDICARE

## 2021-03-05 PROCEDURE — 93798 PHYS/QHP OP CAR RHAB W/ECG: CPT

## 2021-03-08 ENCOUNTER — HOSPITAL ENCOUNTER (OUTPATIENT)
Dept: CARDIAC REHAB | Age: 76
Discharge: HOME | End: 2021-03-08
Payer: MEDICARE

## 2021-03-08 PROCEDURE — 93798 PHYS/QHP OP CAR RHAB W/ECG: CPT

## 2021-03-10 ENCOUNTER — HOSPITAL ENCOUNTER (OUTPATIENT)
Dept: CARDIAC REHAB | Age: 76
Discharge: HOME | End: 2021-03-10
Payer: MEDICARE

## 2021-03-10 VITALS — BODY MASS INDEX: 30.71 KG/M2 | WEIGHT: 199 LBS

## 2021-03-10 DIAGNOSIS — R06.02 SOB (SHORTNESS OF BREATH): ICD-10-CM

## 2021-03-10 PROCEDURE — 93798 PHYS/QHP OP CAR RHAB W/ECG: CPT

## 2021-03-11 ENCOUNTER — TRANSCRIBE ORDER (OUTPATIENT)
Dept: REGISTRATION | Age: 76
End: 2021-03-11

## 2021-03-11 ENCOUNTER — HOSPITAL ENCOUNTER (OUTPATIENT)
Dept: ULTRASOUND IMAGING | Age: 76
Discharge: HOME | End: 2021-03-11
Attending: NURSE PRACTITIONER
Payer: MEDICARE

## 2021-03-11 ENCOUNTER — HOSPITAL ENCOUNTER (OUTPATIENT)
Dept: CT IMAGING | Age: 76
Discharge: HOME | End: 2021-03-11
Attending: NURSE PRACTITIONER
Payer: MEDICARE

## 2021-03-11 ENCOUNTER — HOSPITAL ENCOUNTER (OUTPATIENT)
Dept: GENERAL RADIOLOGY | Age: 76
Discharge: HOME | End: 2021-03-11
Payer: MEDICARE

## 2021-03-11 ENCOUNTER — HOSPITAL ENCOUNTER (OUTPATIENT)
Dept: GENERAL RADIOLOGY | Age: 76
End: 2021-03-11
Payer: MEDICARE

## 2021-03-11 DIAGNOSIS — C61 PROSTATE CANCER (HCC): ICD-10-CM

## 2021-03-11 DIAGNOSIS — R06.02 SHORTNESS OF BREATH: Primary | ICD-10-CM

## 2021-03-11 DIAGNOSIS — R06.02 SHORTNESS OF BREATH: ICD-10-CM

## 2021-03-11 DIAGNOSIS — R06.02 SOB (SHORTNESS OF BREATH): ICD-10-CM

## 2021-03-11 LAB — CREAT BLD-MCNC: 0.6 MG/DL (ref 0.8–1.5)

## 2021-03-11 PROCEDURE — 71260 CT THORAX DX C+: CPT

## 2021-03-11 PROCEDURE — 93970 EXTREMITY STUDY: CPT

## 2021-03-11 PROCEDURE — 71046 X-RAY EXAM CHEST 2 VIEWS: CPT

## 2021-03-11 PROCEDURE — 82565 ASSAY OF CREATININE: CPT

## 2021-03-11 PROCEDURE — 74011000636 HC RX REV CODE- 636: Performed by: NURSE PRACTITIONER

## 2021-03-11 RX ORDER — SODIUM CHLORIDE 0.9 % (FLUSH) 0.9 %
10 SYRINGE (ML) INJECTION
Status: COMPLETED | OUTPATIENT
Start: 2021-03-11 | End: 2021-03-11

## 2021-03-11 RX ADMIN — IOPAMIDOL 100 ML: 755 INJECTION, SOLUTION INTRAVENOUS at 17:20

## 2021-03-11 RX ADMIN — Medication 10 ML: at 17:20

## 2021-03-12 ENCOUNTER — HOSPITAL ENCOUNTER (OUTPATIENT)
Dept: CARDIAC REHAB | Age: 76
Discharge: HOME | End: 2021-03-12
Payer: MEDICARE

## 2021-03-12 PROCEDURE — 93798 PHYS/QHP OP CAR RHAB W/ECG: CPT

## 2021-03-15 ENCOUNTER — HOSPITAL ENCOUNTER (OUTPATIENT)
Dept: CARDIAC REHAB | Age: 76
Discharge: HOME | End: 2021-03-15
Payer: MEDICARE

## 2021-03-15 PROCEDURE — 93798 PHYS/QHP OP CAR RHAB W/ECG: CPT

## 2021-03-17 ENCOUNTER — HOSPITAL ENCOUNTER (OUTPATIENT)
Dept: CARDIAC REHAB | Age: 76
Discharge: HOME | End: 2021-03-17
Payer: MEDICARE

## 2021-03-17 VITALS — BODY MASS INDEX: 31.07 KG/M2 | WEIGHT: 198.4 LBS

## 2021-03-17 PROCEDURE — 93798 PHYS/QHP OP CAR RHAB W/ECG: CPT

## 2021-03-19 ENCOUNTER — HOSPITAL ENCOUNTER (OUTPATIENT)
Dept: CARDIAC REHAB | Age: 76
Discharge: HOME | End: 2021-03-19
Payer: MEDICARE

## 2021-03-19 ENCOUNTER — HOSPITAL ENCOUNTER (OUTPATIENT)
Dept: CT IMAGING | Age: 76
Discharge: HOME | End: 2021-03-19
Attending: NURSE PRACTITIONER
Payer: MEDICARE

## 2021-03-19 DIAGNOSIS — R93.89 ABNORMAL CT SCAN, CHEST: ICD-10-CM

## 2021-03-19 LAB — CREAT BLD-MCNC: 0.5 MG/DL (ref 0.8–1.5)

## 2021-03-19 PROCEDURE — 82565 ASSAY OF CREATININE: CPT

## 2021-03-19 PROCEDURE — 74178 CT ABD&PLV WO CNTR FLWD CNTR: CPT

## 2021-03-19 PROCEDURE — 74011000636 HC RX REV CODE- 636: Performed by: NURSE PRACTITIONER

## 2021-03-19 PROCEDURE — 74011000258 HC RX REV CODE- 258: Performed by: NURSE PRACTITIONER

## 2021-03-19 PROCEDURE — 93798 PHYS/QHP OP CAR RHAB W/ECG: CPT

## 2021-03-19 RX ORDER — SODIUM CHLORIDE 0.9 % (FLUSH) 0.9 %
10 SYRINGE (ML) INJECTION
Status: COMPLETED | OUTPATIENT
Start: 2021-03-19 | End: 2021-03-19

## 2021-03-19 RX ADMIN — Medication 10 ML: at 14:38

## 2021-03-19 RX ADMIN — SODIUM CHLORIDE 100 ML: 900 INJECTION, SOLUTION INTRAVENOUS at 14:38

## 2021-03-19 RX ADMIN — IOPAMIDOL 100 ML: 755 INJECTION, SOLUTION INTRAVENOUS at 14:38

## 2021-03-22 ENCOUNTER — APPOINTMENT (OUTPATIENT)
Dept: CARDIAC REHAB | Age: 76
End: 2021-03-22
Payer: MEDICARE

## 2021-03-22 ENCOUNTER — HOSPITAL ENCOUNTER (OUTPATIENT)
Dept: CARDIAC REHAB | Age: 76
Discharge: HOME | End: 2021-03-22
Payer: MEDICARE

## 2021-03-22 PROCEDURE — 93798 PHYS/QHP OP CAR RHAB W/ECG: CPT

## 2021-03-24 ENCOUNTER — APPOINTMENT (OUTPATIENT)
Dept: CARDIAC REHAB | Age: 76
End: 2021-03-24
Payer: MEDICARE

## 2021-03-24 ENCOUNTER — HOSPITAL ENCOUNTER (OUTPATIENT)
Dept: CARDIAC REHAB | Age: 76
Discharge: HOME | End: 2021-03-24
Payer: MEDICARE

## 2021-03-24 VITALS — WEIGHT: 200 LBS | BODY MASS INDEX: 31.32 KG/M2

## 2021-03-24 PROCEDURE — 93798 PHYS/QHP OP CAR RHAB W/ECG: CPT

## 2021-03-26 ENCOUNTER — HOSPITAL ENCOUNTER (OUTPATIENT)
Dept: CARDIAC REHAB | Age: 76
Discharge: HOME | End: 2021-03-26
Payer: MEDICARE

## 2021-03-26 PROCEDURE — 93798 PHYS/QHP OP CAR RHAB W/ECG: CPT

## 2021-03-26 NOTE — PROGRESS NOTES
77year old male significant CVD history s/p PCI on 1/29/2021,enrolled in cardiac rehabilitation, seen today for initial nutrition counseling. States good energy level, and no concerns with appetite today. Stated Nutrition Goals: to learn about heart healthy diet. Medical History: CAD, CA, HLD, former smoker, Afib  Nutrition related medications/supplements: statin, betablocker, started on Lasix and KCL on 3/23/2021  Nutrition Related Labs:  1/30/21: 70.2 (H for cardiac history), HDL 66 (cardioprotective)  Blood Sugar Monitoring:    Social History/Support System: Pt lives alone    Physical Activity: Rehabilitation 3x per week. Lifestyle, Culture Family Influence: no concerns voiced    Food and Nutrition Intake History:   Prepares most meals at home, likes to cook, bought an airfryer for himself. Chooses lean meat, poultry and fish. Eats fried food occasionally. Shops at [employer]. Does not look at food labels. Breakfast: oatmeal with water, sausage, whole wheat toast, coffee with 3 tsp brown sugar, OJ- 6 ounces. AM Snack: Ashley snaps    Lunch: Tuna sandwich with onions, salad dressing, water or cranberry juice  Snack: none  Dinner: anusha greens, mashed sweet potatoes with brown sugar, cinnamon and nutmeg, chicken. PM Snack: none  Beverages:< 24 ounces of water  Alcohol use: none    One day recall food recall appears high in sodium,   One day food recall appears inadequate in fiber,     GI Hx: /Digestive Issues: GERD    Anthropometric Data:  BMI: 31.32 kg/m²   Height: 5' 7\" (1.702 m). Weight: 90.7 kg (200 lb). Waist measurement (inches): 40- place pt at greater metabolic risk-- reviewed today.      Estimated Nutritional Needs:  Calories: MSJ x 1.2 (sedentary) for weight maintenance to start: 1900kcal/day  Protein: 100g (20% of estimated energy low end needs)  Stage of Behavior Change: reoperation   Nutrition Diagnosis:    Imbalanced intake of nutrient related to food choices, nutrition knowledged evidenced by diet recall. NUTRITION INTERVENTIONS:  1. Nutrition Prescription Recommendation:   Recommended Modifications of Meals, Snacks and Beverages:  o Include vegetables, fruits, whole grains, nuts/seeds, beans/legumes in all meals. o Less than 13 grams of saturated fat and avoid trans fat daily. o Include unsaturated fat sources (nuts, seeds, avocado). o Consuming fish 2 or more times weekly. o Daily-weekly consumption of dairy, poultry, and eggs  o Limit standard western diet foods include processed meat, processed carbohydrates and fried foods  o Water as primary beverage    2. Cardioprotective Nutrition Education:    Educated patient on cardioprotective meal pattern/ DASH including  o Guidelines for saturated fat (<7% total calories), sodium ( < 2000mg/day or follow MD recommendations), and added sugars ( < 25 grams for women/<36 grams for men) and high sources of each reviewed  o Consumption of daily fiber intake with emphasis on 7-13 grams of soluble fiber daily and food sources reviewed. o Emphasis and sources of unsaturated fats and specific benefits of omega 3 fatty acids reviewed for cardioprotective benefits. o Emphasis on cardioprotective benefits of daily intake of plant-focused eating pattern. o Demonstrated food label reading and     Handouts provided for home use:    [employer] plate method   Tips for reducing sodium    Nutrition Goals: Increase daily water intake by increasing to 16 ounces with AM and PM meds + between meals with goal of 48 ounces by the end of cardiac rehab. Monitoring/Evaluation: RD to follow up with participant during rehab sessions for questions and assessment of progression toward goals. Anticipated Compliance: Good- pt involved with goal setting and motivated to make change today. Pt verbalizes understanding to recommendations discussed.    Barriers: None identified at this time     Ivana Little, MS, RD, LD  Cardiopulmonary Rehab Dietitian

## 2021-03-29 ENCOUNTER — APPOINTMENT (OUTPATIENT)
Dept: CARDIAC REHAB | Age: 76
End: 2021-03-29
Payer: MEDICARE

## 2021-03-29 ENCOUNTER — HOSPITAL ENCOUNTER (OUTPATIENT)
Dept: CARDIAC REHAB | Age: 76
Discharge: HOME | End: 2021-03-29
Payer: MEDICARE

## 2021-03-29 PROCEDURE — 93798 PHYS/QHP OP CAR RHAB W/ECG: CPT

## 2021-03-31 ENCOUNTER — APPOINTMENT (OUTPATIENT)
Dept: CARDIAC REHAB | Age: 76
End: 2021-03-31
Payer: MEDICARE

## 2021-03-31 ENCOUNTER — HOSPITAL ENCOUNTER (OUTPATIENT)
Dept: CARDIAC REHAB | Age: 76
Discharge: HOME | End: 2021-03-31
Payer: MEDICARE

## 2021-03-31 VITALS — WEIGHT: 197.6 LBS | BODY MASS INDEX: 30.95 KG/M2

## 2021-03-31 PROCEDURE — 93798 PHYS/QHP OP CAR RHAB W/ECG: CPT

## 2021-04-01 ENCOUNTER — HOSPITAL ENCOUNTER (OUTPATIENT)
Dept: CARDIAC REHAB | Age: 76
Discharge: HOME | End: 2021-04-01
Payer: MEDICARE

## 2021-04-01 PROCEDURE — 93798 PHYS/QHP OP CAR RHAB W/ECG: CPT

## 2021-04-05 ENCOUNTER — HOSPITAL ENCOUNTER (OUTPATIENT)
Dept: CARDIAC REHAB | Age: 76
Discharge: HOME | End: 2021-04-05
Payer: MEDICARE

## 2021-04-05 ENCOUNTER — APPOINTMENT (OUTPATIENT)
Dept: CARDIAC REHAB | Age: 76
End: 2021-04-05
Payer: MEDICARE

## 2021-04-05 ENCOUNTER — APPOINTMENT (OUTPATIENT)
Dept: CARDIAC REHAB | Age: 76
End: 2021-04-05

## 2021-04-05 PROCEDURE — 93798 PHYS/QHP OP CAR RHAB W/ECG: CPT

## 2021-04-07 ENCOUNTER — HOSPITAL ENCOUNTER (OUTPATIENT)
Dept: CARDIAC REHAB | Age: 76
Discharge: HOME | End: 2021-04-07
Payer: MEDICARE

## 2021-04-07 ENCOUNTER — APPOINTMENT (OUTPATIENT)
Dept: CARDIAC REHAB | Age: 76
End: 2021-04-07
Payer: MEDICARE

## 2021-04-07 ENCOUNTER — APPOINTMENT (OUTPATIENT)
Dept: CARDIAC REHAB | Age: 76
End: 2021-04-07

## 2021-04-07 VITALS — BODY MASS INDEX: 30.95 KG/M2 | WEIGHT: 197.6 LBS

## 2021-04-07 PROCEDURE — 93798 PHYS/QHP OP CAR RHAB W/ECG: CPT

## 2021-04-09 ENCOUNTER — HOSPITAL ENCOUNTER (OUTPATIENT)
Dept: CARDIAC REHAB | Age: 76
Discharge: HOME | End: 2021-04-09
Payer: MEDICARE

## 2021-04-09 ENCOUNTER — HOSPITAL ENCOUNTER (OUTPATIENT)
Dept: LAB | Age: 76
Discharge: HOME | End: 2021-04-09
Payer: MEDICARE

## 2021-04-09 DIAGNOSIS — R06.02 SHORTNESS OF BREATH: ICD-10-CM

## 2021-04-09 DIAGNOSIS — Z95.1 HX OF CABG: ICD-10-CM

## 2021-04-09 LAB
ANION GAP SERPL CALC-SCNC: 4 MMOL/L (ref 7–16)
BUN SERPL-MCNC: 12 MG/DL (ref 8–23)
CALCIUM SERPL-MCNC: 9.4 MG/DL (ref 8.3–10.4)
CHLORIDE SERPL-SCNC: 102 MMOL/L (ref 98–107)
CO2 SERPL-SCNC: 28 MMOL/L (ref 21–32)
CREAT SERPL-MCNC: 0.58 MG/DL (ref 0.8–1.5)
GLUCOSE SERPL-MCNC: 115 MG/DL (ref 65–100)
POTASSIUM SERPL-SCNC: 3.9 MMOL/L (ref 3.5–5.1)
SODIUM SERPL-SCNC: 134 MMOL/L (ref 136–145)

## 2021-04-09 PROCEDURE — 93798 PHYS/QHP OP CAR RHAB W/ECG: CPT

## 2021-04-09 PROCEDURE — 36415 COLL VENOUS BLD VENIPUNCTURE: CPT

## 2021-04-09 PROCEDURE — 80048 BASIC METABOLIC PNL TOTAL CA: CPT

## 2021-04-12 ENCOUNTER — APPOINTMENT (OUTPATIENT)
Dept: CARDIAC REHAB | Age: 76
End: 2021-04-12
Payer: MEDICARE

## 2021-04-12 ENCOUNTER — HOSPITAL ENCOUNTER (OUTPATIENT)
Dept: CARDIAC REHAB | Age: 76
Discharge: HOME | End: 2021-04-12
Payer: MEDICARE

## 2021-04-12 ENCOUNTER — APPOINTMENT (OUTPATIENT)
Dept: CARDIAC REHAB | Age: 76
End: 2021-04-12

## 2021-04-12 PROCEDURE — 93798 PHYS/QHP OP CAR RHAB W/ECG: CPT

## 2021-04-14 ENCOUNTER — APPOINTMENT (OUTPATIENT)
Dept: CARDIAC REHAB | Age: 76
End: 2021-04-14
Payer: MEDICARE

## 2021-04-14 ENCOUNTER — HOSPITAL ENCOUNTER (OUTPATIENT)
Dept: CARDIAC REHAB | Age: 76
Discharge: HOME | End: 2021-04-14
Payer: MEDICARE

## 2021-04-14 ENCOUNTER — APPOINTMENT (OUTPATIENT)
Dept: CARDIAC REHAB | Age: 76
End: 2021-04-14

## 2021-04-14 VITALS — BODY MASS INDEX: 31.14 KG/M2 | WEIGHT: 198.8 LBS

## 2021-04-14 PROCEDURE — 93798 PHYS/QHP OP CAR RHAB W/ECG: CPT

## 2021-04-16 ENCOUNTER — HOSPITAL ENCOUNTER (OUTPATIENT)
Dept: CARDIAC REHAB | Age: 76
Discharge: HOME | End: 2021-04-16
Payer: MEDICARE

## 2021-04-16 PROCEDURE — 93798 PHYS/QHP OP CAR RHAB W/ECG: CPT

## 2021-04-19 ENCOUNTER — APPOINTMENT (OUTPATIENT)
Dept: CARDIAC REHAB | Age: 76
End: 2021-04-19
Payer: MEDICARE

## 2021-04-19 ENCOUNTER — HOSPITAL ENCOUNTER (OUTPATIENT)
Dept: CARDIAC REHAB | Age: 76
Discharge: HOME | End: 2021-04-19
Payer: MEDICARE

## 2021-04-19 ENCOUNTER — APPOINTMENT (OUTPATIENT)
Dept: CARDIAC REHAB | Age: 76
End: 2021-04-19

## 2021-04-19 PROCEDURE — 93798 PHYS/QHP OP CAR RHAB W/ECG: CPT

## 2021-04-21 ENCOUNTER — APPOINTMENT (OUTPATIENT)
Dept: CARDIAC REHAB | Age: 76
End: 2021-04-21

## 2021-04-21 ENCOUNTER — APPOINTMENT (OUTPATIENT)
Dept: CARDIAC REHAB | Age: 76
End: 2021-04-21
Payer: MEDICARE

## 2021-04-21 ENCOUNTER — HOSPITAL ENCOUNTER (OUTPATIENT)
Dept: CARDIAC REHAB | Age: 76
Discharge: HOME | End: 2021-04-21
Payer: MEDICARE

## 2021-04-21 VITALS — BODY MASS INDEX: 31.29 KG/M2 | WEIGHT: 199.8 LBS

## 2021-04-21 PROCEDURE — 93798 PHYS/QHP OP CAR RHAB W/ECG: CPT

## 2021-04-23 ENCOUNTER — APPOINTMENT (OUTPATIENT)
Dept: CARDIAC REHAB | Age: 76
End: 2021-04-23
Payer: MEDICARE

## 2021-04-26 ENCOUNTER — APPOINTMENT (OUTPATIENT)
Dept: CARDIAC REHAB | Age: 76
End: 2021-04-26

## 2021-04-26 ENCOUNTER — HOSPITAL ENCOUNTER (OUTPATIENT)
Dept: CARDIAC REHAB | Age: 76
Discharge: HOME | End: 2021-04-26
Payer: MEDICARE

## 2021-04-26 ENCOUNTER — APPOINTMENT (OUTPATIENT)
Dept: CARDIAC REHAB | Age: 76
End: 2021-04-26
Payer: MEDICARE

## 2021-04-26 PROCEDURE — 93798 PHYS/QHP OP CAR RHAB W/ECG: CPT

## 2021-04-28 ENCOUNTER — APPOINTMENT (OUTPATIENT)
Dept: CARDIAC REHAB | Age: 76
End: 2021-04-28
Payer: MEDICARE

## 2021-04-28 ENCOUNTER — APPOINTMENT (OUTPATIENT)
Dept: CARDIAC REHAB | Age: 76
End: 2021-04-28

## 2021-04-28 ENCOUNTER — HOSPITAL ENCOUNTER (OUTPATIENT)
Dept: CARDIAC REHAB | Age: 76
Discharge: HOME | End: 2021-04-28
Payer: MEDICARE

## 2021-04-28 VITALS — BODY MASS INDEX: 30.76 KG/M2 | WEIGHT: 196.4 LBS

## 2021-04-28 PROCEDURE — 93798 PHYS/QHP OP CAR RHAB W/ECG: CPT

## 2021-04-30 ENCOUNTER — HOSPITAL ENCOUNTER (OUTPATIENT)
Dept: CARDIAC REHAB | Age: 76
Discharge: HOME | End: 2021-04-30
Payer: MEDICARE

## 2021-04-30 PROCEDURE — 93798 PHYS/QHP OP CAR RHAB W/ECG: CPT

## 2021-05-03 ENCOUNTER — APPOINTMENT (OUTPATIENT)
Dept: CARDIAC REHAB | Age: 76
End: 2021-05-03
Payer: MEDICARE

## 2021-05-03 ENCOUNTER — APPOINTMENT (OUTPATIENT)
Dept: CARDIAC REHAB | Age: 76
End: 2021-05-03

## 2021-05-03 ENCOUNTER — HOSPITAL ENCOUNTER (OUTPATIENT)
Dept: CARDIAC REHAB | Age: 76
Discharge: HOME | End: 2021-05-03
Payer: MEDICARE

## 2021-05-03 PROCEDURE — 93798 PHYS/QHP OP CAR RHAB W/ECG: CPT

## 2021-05-05 ENCOUNTER — APPOINTMENT (OUTPATIENT)
Dept: CARDIAC REHAB | Age: 76
End: 2021-05-05

## 2021-05-05 ENCOUNTER — HOSPITAL ENCOUNTER (OUTPATIENT)
Dept: CARDIAC REHAB | Age: 76
Discharge: HOME | End: 2021-05-05
Payer: MEDICARE

## 2021-05-05 ENCOUNTER — APPOINTMENT (OUTPATIENT)
Dept: CARDIAC REHAB | Age: 76
End: 2021-05-05
Payer: MEDICARE

## 2021-05-05 VITALS — WEIGHT: 193.2 LBS | BODY MASS INDEX: 30.26 KG/M2

## 2021-05-05 PROCEDURE — 93798 PHYS/QHP OP CAR RHAB W/ECG: CPT

## 2021-05-07 ENCOUNTER — HOSPITAL ENCOUNTER (OUTPATIENT)
Dept: CARDIAC REHAB | Age: 76
Discharge: HOME | End: 2021-05-07
Payer: MEDICARE

## 2021-05-07 PROCEDURE — 93798 PHYS/QHP OP CAR RHAB W/ECG: CPT

## 2021-05-10 ENCOUNTER — APPOINTMENT (OUTPATIENT)
Dept: CARDIAC REHAB | Age: 76
End: 2021-05-10
Payer: MEDICARE

## 2021-05-10 ENCOUNTER — HOSPITAL ENCOUNTER (OUTPATIENT)
Dept: CARDIAC REHAB | Age: 76
Discharge: HOME | End: 2021-05-10
Payer: MEDICARE

## 2021-05-10 ENCOUNTER — APPOINTMENT (OUTPATIENT)
Dept: CARDIAC REHAB | Age: 76
End: 2021-05-10

## 2021-05-10 PROCEDURE — 93798 PHYS/QHP OP CAR RHAB W/ECG: CPT

## 2021-05-12 ENCOUNTER — HOSPITAL ENCOUNTER (OUTPATIENT)
Dept: CARDIAC REHAB | Age: 76
Discharge: HOME | End: 2021-05-12
Payer: MEDICARE

## 2021-05-12 ENCOUNTER — APPOINTMENT (OUTPATIENT)
Dept: CARDIAC REHAB | Age: 76
End: 2021-05-12

## 2021-05-12 ENCOUNTER — APPOINTMENT (OUTPATIENT)
Dept: CARDIAC REHAB | Age: 76
End: 2021-05-12
Payer: MEDICARE

## 2021-05-12 VITALS — WEIGHT: 193.2 LBS | BODY MASS INDEX: 30.26 KG/M2

## 2021-05-12 PROCEDURE — 93798 PHYS/QHP OP CAR RHAB W/ECG: CPT

## 2021-05-14 ENCOUNTER — HOSPITAL ENCOUNTER (OUTPATIENT)
Dept: CARDIAC REHAB | Age: 76
Discharge: HOME | End: 2021-05-14
Payer: MEDICARE

## 2021-05-14 PROCEDURE — 93798 PHYS/QHP OP CAR RHAB W/ECG: CPT

## 2021-05-17 ENCOUNTER — HOSPITAL ENCOUNTER (OUTPATIENT)
Dept: CARDIAC REHAB | Age: 76
Discharge: HOME | End: 2021-05-17
Payer: MEDICARE

## 2021-05-17 ENCOUNTER — APPOINTMENT (OUTPATIENT)
Dept: CARDIAC REHAB | Age: 76
End: 2021-05-17
Payer: MEDICARE

## 2021-05-17 ENCOUNTER — APPOINTMENT (OUTPATIENT)
Dept: CARDIAC REHAB | Age: 76
End: 2021-05-17

## 2021-05-17 PROCEDURE — 93798 PHYS/QHP OP CAR RHAB W/ECG: CPT

## 2021-05-18 ENCOUNTER — HOSPITAL ENCOUNTER (OUTPATIENT)
Dept: CARDIAC REHAB | Age: 76
Discharge: HOME | End: 2021-05-18
Payer: MEDICARE

## 2021-05-18 VITALS — WEIGHT: 195 LBS | BODY MASS INDEX: 30.54 KG/M2

## 2021-05-18 PROCEDURE — 93798 PHYS/QHP OP CAR RHAB W/ECG: CPT

## 2021-05-19 ENCOUNTER — APPOINTMENT (OUTPATIENT)
Dept: CARDIAC REHAB | Age: 76
End: 2021-05-19
Payer: MEDICARE

## 2021-05-19 ENCOUNTER — APPOINTMENT (OUTPATIENT)
Dept: CARDIAC REHAB | Age: 76
End: 2021-05-19

## 2021-05-21 ENCOUNTER — HOSPITAL ENCOUNTER (OUTPATIENT)
Dept: CARDIAC REHAB | Age: 76
Discharge: HOME | End: 2021-05-21
Payer: MEDICARE

## 2021-05-21 PROCEDURE — 93798 PHYS/QHP OP CAR RHAB W/ECG: CPT

## 2021-05-24 ENCOUNTER — APPOINTMENT (OUTPATIENT)
Dept: CARDIAC REHAB | Age: 76
End: 2021-05-24

## 2021-05-24 ENCOUNTER — APPOINTMENT (OUTPATIENT)
Dept: CARDIAC REHAB | Age: 76
End: 2021-05-24
Payer: MEDICARE

## 2021-05-24 ENCOUNTER — HOSPITAL ENCOUNTER (OUTPATIENT)
Dept: CARDIAC REHAB | Age: 76
Discharge: HOME | End: 2021-05-24
Payer: MEDICARE

## 2021-05-24 PROCEDURE — 93798 PHYS/QHP OP CAR RHAB W/ECG: CPT

## 2021-05-26 ENCOUNTER — APPOINTMENT (OUTPATIENT)
Dept: CARDIAC REHAB | Age: 76
End: 2021-05-26
Payer: MEDICARE

## 2021-05-26 ENCOUNTER — APPOINTMENT (OUTPATIENT)
Dept: CARDIAC REHAB | Age: 76
End: 2021-05-26

## 2021-05-26 ENCOUNTER — HOSPITAL ENCOUNTER (OUTPATIENT)
Dept: CARDIAC REHAB | Age: 76
Discharge: HOME | End: 2021-05-26
Payer: MEDICARE

## 2021-05-26 VITALS — BODY MASS INDEX: 30.48 KG/M2 | WEIGHT: 194.6 LBS

## 2021-05-26 PROCEDURE — 93798 PHYS/QHP OP CAR RHAB W/ECG: CPT

## 2021-05-28 ENCOUNTER — APPOINTMENT (OUTPATIENT)
Dept: CARDIAC REHAB | Age: 76
End: 2021-05-28
Payer: MEDICARE

## 2021-08-03 ENCOUNTER — HOSPITAL ENCOUNTER (OUTPATIENT)
Dept: MRI IMAGING | Age: 76
Discharge: HOME | End: 2021-08-03
Attending: SURGERY
Payer: MEDICARE

## 2021-08-03 DIAGNOSIS — M79.605 LEG PAIN, BILATERAL: ICD-10-CM

## 2021-08-03 DIAGNOSIS — M79.604 LEG PAIN, BILATERAL: ICD-10-CM

## 2021-08-03 PROCEDURE — 72148 MRI LUMBAR SPINE W/O DYE: CPT

## 2021-08-12 PROBLEM — I48.3 TYPICAL ATRIAL FLUTTER (HCC): Status: ACTIVE | Noted: 2021-08-12

## 2021-08-12 PROBLEM — I48.20 CHRONIC ATRIAL FIBRILLATION, UNSPECIFIED (HCC): Status: ACTIVE | Noted: 2021-08-12

## 2021-08-20 ENCOUNTER — HOSPITAL ENCOUNTER (OUTPATIENT)
Dept: ULTRASOUND IMAGING | Age: 76
Discharge: HOME | End: 2021-08-20
Attending: FAMILY MEDICINE
Payer: MEDICARE

## 2021-08-20 DIAGNOSIS — Z13.6 SCREENING FOR AAA (ABDOMINAL AORTIC ANEURYSM): ICD-10-CM

## 2021-08-20 PROCEDURE — 76706 US ABDL AORTA SCREEN AAA: CPT

## 2021-10-21 ENCOUNTER — ANESTHESIA EVENT (OUTPATIENT)
Dept: ENDOSCOPY | Age: 76
End: 2021-10-21
Payer: MEDICARE

## 2021-10-21 RX ORDER — SODIUM CHLORIDE, SODIUM LACTATE, POTASSIUM CHLORIDE, CALCIUM CHLORIDE 600; 310; 30; 20 MG/100ML; MG/100ML; MG/100ML; MG/100ML
100 INJECTION, SOLUTION INTRAVENOUS CONTINUOUS
Status: CANCELLED | OUTPATIENT
Start: 2021-10-21

## 2021-10-21 NOTE — PROGRESS NOTES
Attempted to call patient to confirm scheduled procedure for tomorrow. No answer on phone numbers provided.

## 2021-10-22 ENCOUNTER — ANESTHESIA (OUTPATIENT)
Dept: ENDOSCOPY | Age: 76
End: 2021-10-22
Payer: MEDICARE

## 2021-10-22 ENCOUNTER — HOSPITAL ENCOUNTER (OUTPATIENT)
Age: 76
Setting detail: OUTPATIENT SURGERY
Discharge: HOME | End: 2021-10-22
Attending: SURGERY | Admitting: SURGERY
Payer: MEDICARE

## 2021-10-22 VITALS
SYSTOLIC BLOOD PRESSURE: 120 MMHG | HEART RATE: 71 BPM | RESPIRATION RATE: 16 BRPM | OXYGEN SATURATION: 96 % | TEMPERATURE: 98.7 F | DIASTOLIC BLOOD PRESSURE: 72 MMHG

## 2021-10-22 DIAGNOSIS — D37.4: ICD-10-CM

## 2021-10-22 DIAGNOSIS — K57.30 PANCOLONIC DIVERTICULOSIS: ICD-10-CM

## 2021-10-22 DIAGNOSIS — K29.30 SUPERFICIAL GASTRITIS WITHOUT HEMORRHAGE, UNSPECIFIED CHRONICITY: ICD-10-CM

## 2021-10-22 DIAGNOSIS — D50.8 OTHER IRON DEFICIENCY ANEMIA: ICD-10-CM

## 2021-10-22 PROCEDURE — 77030009426 HC FCPS BIOP ENDOSC BSC -B: Performed by: SURGERY

## 2021-10-22 PROCEDURE — 88312 SPECIAL STAINS GROUP 1: CPT

## 2021-10-22 PROCEDURE — 88305 TISSUE EXAM BY PATHOLOGIST: CPT

## 2021-10-22 PROCEDURE — 45384 COLONOSCOPY W/LESION REMOVAL: CPT | Performed by: SURGERY

## 2021-10-22 PROCEDURE — 76040000026: Performed by: SURGERY

## 2021-10-22 PROCEDURE — 74011000250 HC RX REV CODE- 250: Performed by: ANESTHESIOLOGY

## 2021-10-22 PROCEDURE — 74011250636 HC RX REV CODE- 250/636: Performed by: ANESTHESIOLOGY

## 2021-10-22 PROCEDURE — 87081 CULTURE SCREEN ONLY: CPT

## 2021-10-22 PROCEDURE — 76060000032 HC ANESTHESIA 0.5 TO 1 HR: Performed by: SURGERY

## 2021-10-22 PROCEDURE — 43239 EGD BIOPSY SINGLE/MULTIPLE: CPT | Performed by: SURGERY

## 2021-10-22 PROCEDURE — 2709999900 HC NON-CHARGEABLE SUPPLY: Performed by: SURGERY

## 2021-10-22 RX ORDER — EPHEDRINE SULFATE/0.9% NACL/PF 50 MG/5 ML
SYRINGE (ML) INTRAVENOUS AS NEEDED
Status: DISCONTINUED | OUTPATIENT
Start: 2021-10-22 | End: 2021-10-22 | Stop reason: HOSPADM

## 2021-10-22 RX ORDER — PROPOFOL 10 MG/ML
INJECTION, EMULSION INTRAVENOUS AS NEEDED
Status: DISCONTINUED | OUTPATIENT
Start: 2021-10-22 | End: 2021-10-22 | Stop reason: HOSPADM

## 2021-10-22 RX ORDER — PROPOFOL 10 MG/ML
INJECTION, EMULSION INTRAVENOUS
Status: DISCONTINUED | OUTPATIENT
Start: 2021-10-22 | End: 2021-10-22 | Stop reason: HOSPADM

## 2021-10-22 RX ORDER — SODIUM CHLORIDE, SODIUM LACTATE, POTASSIUM CHLORIDE, CALCIUM CHLORIDE 600; 310; 30; 20 MG/100ML; MG/100ML; MG/100ML; MG/100ML
100 INJECTION, SOLUTION INTRAVENOUS CONTINUOUS
Status: DISCONTINUED | OUTPATIENT
Start: 2021-10-22 | End: 2021-10-25 | Stop reason: HOSPADM

## 2021-10-22 RX ORDER — LIDOCAINE HYDROCHLORIDE 20 MG/ML
INJECTION, SOLUTION EPIDURAL; INFILTRATION; INTRACAUDAL; PERINEURAL AS NEEDED
Status: DISCONTINUED | OUTPATIENT
Start: 2021-10-22 | End: 2021-10-22 | Stop reason: HOSPADM

## 2021-10-22 RX ADMIN — PROPOFOL 40 MG: 10 INJECTION, EMULSION INTRAVENOUS at 09:19

## 2021-10-22 RX ADMIN — Medication 10 MG: at 09:58

## 2021-10-22 RX ADMIN — LIDOCAINE HYDROCHLORIDE 60 MG: 20 INJECTION, SOLUTION EPIDURAL; INFILTRATION; INTRACAUDAL; PERINEURAL at 09:19

## 2021-10-22 RX ADMIN — PROPOFOL 30 MG: 10 INJECTION, EMULSION INTRAVENOUS at 09:20

## 2021-10-22 RX ADMIN — Medication 10 MG: at 09:47

## 2021-10-22 RX ADMIN — PROPOFOL 20 MG: 10 INJECTION, EMULSION INTRAVENOUS at 09:27

## 2021-10-22 RX ADMIN — SODIUM CHLORIDE, SODIUM LACTATE, POTASSIUM CHLORIDE, AND CALCIUM CHLORIDE: 600; 310; 30; 20 INJECTION, SOLUTION INTRAVENOUS at 09:14

## 2021-10-22 RX ADMIN — SODIUM CHLORIDE, SODIUM LACTATE, POTASSIUM CHLORIDE, AND CALCIUM CHLORIDE 100 ML/HR: 600; 310; 30; 20 INJECTION, SOLUTION INTRAVENOUS at 10:00

## 2021-10-22 RX ADMIN — PROPOFOL 140 MCG/KG/MIN: 10 INJECTION, EMULSION INTRAVENOUS at 09:19

## 2021-10-22 RX ADMIN — PROPOFOL 20 MG: 10 INJECTION, EMULSION INTRAVENOUS at 09:30

## 2021-10-22 RX ADMIN — PROPOFOL 30 MG: 10 INJECTION, EMULSION INTRAVENOUS at 09:21

## 2021-10-22 NOTE — H&P
History and Physical      Patient: Migdalia Baker Sr.    Physician: Shahbaz Andrews MD    Referring Physician: Felisha Fam DO    Chief Complaint: For colonoscopy/EGD    History of Present Illness: Pt presents for colonoscopy. His last colo 2018 with tubular adenoma. Had polyp(s) on outside exam approx 2011, with neg followup approx 2013. Hb has dropped to 10's and persisted for >1yr. Indices normal, Fe currently normal.    History:  Past Medical History:   Diagnosis Date    Arrhythmia 8/2015 at [facility]    A-Flutter ablation--- dr Radha Wall    Aspirin long-term use     CAD (coronary artery disease)     mi--2/2015 cabg 2/2015---- No stents    CAD (coronary artery disease) 01/29/2021    PCI    CAD in native artery 2/19/2015    2/19/15 (Dr Aroldo Mondragon) Coronary artery bypass grafting x2 with grafts consisting of bilateral mammaries     Cancer [facility])     prostate    Elevated hemidiaphragm 2/21/2015 2/20 barium swallow on 2/12/15 that revealed an unremarkable esophagus and paralyzed left diaphragm with an unusual rotation of the stomach in the left upper quadrant  esophagram 2/12/2015 which revealed an abnormal contour the left hemidiaphragm, suggesting diaphragmatic hernia. 2/20 Barium Swallow Barium swallow today to assess for any signs of possible torsion. If no torsion present, will likely recommend outpatient surgical consult and continued follow up with [facility]. Debbi Hopkins [facility].       Elevated hemoglobin A1c 2/20/2015    6.1 - 2/17/15     Full dentures 2/21/2015    GERD (gastroesophageal reflux disease)     controlled with med    Hernia of abdominal cavity 6/26/2015    History of atrial flutter 8/2015    ablation    History of complete eye exam 01/01/2015    [facility]    Hypercholesterolemia     Hyperlipidemia     Hyperlipidemia 2/18/2015    Hypertension     controlled with med    Hypoxemia 2/21/2015    MI, old 2/2015    NSTEMI (non-ST elevated myocardial infarction) ([address] 2/14/2015  OA (osteoarthritis) 2015    Pleural effusion on right 2015    Postoperative anemia due to acute blood loss 2015    S/P CABG x 2 2015    Thrombocytopenia due to extra corporeal by-pass circulation 2015    Patient denies    Varicose veins of lower extremities with other complications 1162    7/24/15 (Dr Joshua Hill) L GSV RFA ablation 5/15/14 (Dr. Joshua Hill) R GSV Radiofrequency ablation     Wears dentures      Past Surgical History:   Procedure Laterality Date    COLONOSCOPY N/A 3/9/2018    COLONOSCOPY/  per[facility] Hall [facility] ENDOSCOPY    HX COLONOSCOPY      HX CORONARY ARTERY BYPASS GRAFT  2/15    DR. Terrell    HX CORONARY STENT PLACEMENT  2021    Two 3.0 x 12 [place] drug-eluting stents    HX FRACTURE TX Right 2007    elbow fx    HX HEART CATHETERIZATION  2015 and 8/15    HX HEART CATHETERIZATION  2015     E-Couzrev-dewxpmep    HX HERNIA REPAIR  1934    umbilical    HX HERNIA REPAIR Left unsure of 2nd surgery    LIH and redo embilical, Both Repairs at same time    HX KNEE ARTHROSCOPY Left 2006    left knee    HX ORTHOPAEDIC Right 2007    right elbow fx    HX VEIN STRIPPING      NC CABG, ARTERY-VEIN, TWO      Dr. Mel Gaming FLX W/REMOVAL LESION BY HOT BX FORCEPS  3/9/2018         NC LEFT HEART CATH,PERCUTANEOUS  2021    angioplasty and stenting of the two high-grade lesions in the circumflex      Social History     Socioeconomic History    Marital status: SINGLE     Spouse name: Not on file    Number of children: Not on file    Years of education: Not on file    Highest education level: Not on file   Tobacco Use    Smoking status: Former Smoker     Packs/day: 0.25     Years: 40.00     Pack years: 10.00     Quit date: 2001     Years since quittin.5    Smokeless tobacco: Never Used   Vaping Use    Vaping Use: Never used   Substance and Sexual Activity    Alcohol use: No    Drug use: No   Other Topics Concern     Social Determinants of Health     Financial Resource Strain:     Difficulty of Paying Living Expenses:    Food Insecurity:     Worried About Running Out of Food in the Last Year:     [address] in the Last Year:    Transportation Needs:     Lack of Transportation (Medical):  Lack of Transportation (Non-Medical):    Physical Activity:     Days of Exercise per Week:     Minutes of Exercise per Session:    Stress:     Feeling of Stress :    Social Connections:     Frequency of Communication with Friends and Family:     Frequency of Social Gatherings with Friends and Family:     Attends Taoism Services:     Active Member of Clubs or Organizations:     Attends Club or Organization Meetings:     Marital Status:       Family History   Problem Relation Age of Onset    Heart Disease Brother     Hypertension Brother     Heart Disease Father     Hypertension Sister     Hypertension Brother     No Known Problems Mother        Medications:   Prior to Admission medications    Medication Sig Start Date End Date Taking? Authorizing Provider   COQ10, LIPOSOMAL UBIQUINOL, PO Take  by mouth daily. Yes Provider, Historical   magnesium 250 mg tab Take  by mouth daily. Yes Provider, Historical   potassium 99 mg tablet Take 99 mg by mouth daily. Yes Provider, Historical   SELENIUM PO Take  by mouth daily. Yes Provider, Historical   tamsulosin (FLOMAX) 0.4 mg capsule Take 1 Capsule by mouth nightly for 90 days. 9/2/21 12/1/21 Yes Ghislaine Anderson MD   ferrous sulfate 325 mg (65 mg iron) tablet Take 1 Tablet by mouth Daily (before breakfast). 8/23/21  Yes Di Lowry P, DO   doxazosin (CARDURA) 8 mg tablet Take 1 Tablet by mouth two (2) times a day. 8/12/21  Yes Di Lowry P, DO   clopidogreL (Plavix) 75 mg tab Take 1 Tablet by mouth daily. 8/12/21  Yes Di Lowry P, DO   metoprolol tartrate (LOPRESSOR) 25 mg tablet Take 0.5 Tablets by mouth every twelve (12) hours. 8/12/21  Yes Zane March P, DO   pravastatin (PRAVACHOL) 80 mg tablet Take 1 Tablet by mouth nightly. 8/12/21  Yes Reed West P, DO   omeprazole (PRILOSEC) 20 mg capsule TAKE 1 CAPSULE TWO TIMES A DAY FOR GASTROESOPHAGEAL REFLUX 8/12/21  Yes Zane March P, DO   lisinopriL (PRINIVIL, ZESTRIL) 20 mg tablet TAKE 1 TABLET TWICE DAILY FOR HYPERTENSION 8/12/21  Yes Zane March P, DO   zinc 50 mg tab tablet Take  by mouth daily. Yes Provider, Historical   aspirin delayed-release 81 mg tablet Take 1 Tab by mouth daily. 8/27/20  Yes Omega March P, DO   ascorbic acid, vitamin C, (Vitamin C) 500 mg tablet Take  by mouth daily. Yes Provider, Historical   Ca-D3-mag ox-zinc--perlita-bor (CALCIUM 600-D3 PLUS) 600 mg calcium- 800 unit-50 mg tab Take 1 Tab by mouth daily. Yes Provider, Historical   Fish Oil-Omega-3 Fatty Acids ([address] [place] [address] 3-6-9) 300-1,000 mg cpDR Take 2 Tabs by mouth every morning. Indications: last dose 3/7/18   Yes Provider, Historical   MULTIVITS-MINERALS/FA/LYCOPENE (ONE-A-DAY MEN'S PO) Take 1 Tab by mouth daily. Indications: last dose 3/7/18   Yes Provider, Historical   DISABLED PLACARD (DISABLED PLACARD) DMV Please provide patient with handicap placard 6/21/21   Kehinde Link NP   nitroglycerin (NITROSTAT) 0.4 mg SL tablet 1 Tab by SubLINGual route every five (5) minutes as needed for Chest Pain. Up to 3 doses. 1/30/21   Little Smith NP       Allergies: No Known Allergies    Physical Exam:     Vital Signs: There were no vitals taken for this visit. .    General: no distress      Heart: regular   Lungs: unlabored   Abdominal: soft   Neurological: Grossly normal        Findings/Diagnosis: Encounter for colorectal cancer screening due to personal history of adenomatous polyp(s), anemia. Plan of Care/Planned Procedure: EGD/Colonoscopy, possible polypectomy. Pt/designee has reviewed the colonoscopy information sheet. Any questions have been discussed.   They agree to proceed.       Signed:  Cassie Lyn MD   10/22/2021

## 2021-10-22 NOTE — DISCHARGE INSTRUCTIONS
Gastrointestinal Colonoscopy/Flexible Sigmoidoscopy - Lower Exam Discharge Instructions  1. Call Dr. Solomon Nielsen at [phone] for any problems or questions. 2. Contact the doctors office for follow up appointment as directed  3. Medication may cause drowsiness for several hours, therefore:  · Do not drive or operate machinery for reminder of the day. · No alcohol today. · Do not make any important or legal decisions for 24 hours. · Do not sign any legal documents for 24 hours. 4. Ordinarily, you may resume regular diet and activity after exam unless otherwise specified by your physician. 5. Because of air put into your colon during exam, you may experience some abdominal distension, relieved by the passage of gas, for several hours. 6. Contact your physician if you have any of the following:  · Excessive amount of bleeding - large amount when having a bowel movement. Occasional specks of blood with bowel movement would not be unusual.  · Severe abdominal pain  · Fever or Chills  7. Polyp Removal - follow these additional instructions  · Clear liquid diet for the next meal (jell-o, broth, clear drinks)  · Soft diet for 24 hours, then resume regular diet   · Take Metamucil - 1 tablespoon in juice every morning for 3 days  · No Aspirin, Advil, Aleve, Nuprin, Ibuprofen, or medications that contain these drugs for 2 weeks. Any additional instructions: Follow up with the office for pathology results  Restart plavix on wednesday        Gastrointestinal Esophagogastroduodenoscopy (EGD) - Upper Exam Discharge Instructions    1. Call  at [phone] for any problems or questions. 2. Contact the doctor's office for follow up appointment as directed. 3. Medication may cause drowsiness for several hours, therefore:  · Do not drive or operate machinery for remainder of the day. · No alcohol today. · Do not make any important or legal decisions for 24 hours.   · Do not sign any legal documents for 24 hours.    5. Ordinarily, you may resume regular diet and activity after exam unless otherwise  specified by your physician. 6. For mild soreness in your throat you may use Cepacol throat lozenges or warm  salt-water gargles as needed.     Any additional instructions:  ***    I

## 2021-10-22 NOTE — ANESTHESIA POSTPROCEDURE EVALUATION
Procedure(s):  ESOPHAGOGASTRODUODENOSCOPY (EGD)  COLONOSCOPY/BMI 30  ESOPHAGOGASTRODUODENAL (EGD) BIOPSY  ENDOSCOPIC POLYPECTOMY. total IV anesthesia    Anesthesia Post Evaluation      Multimodal analgesia: multimodal analgesia used between 6 hours prior to anesthesia start to PACU discharge  Patient location during evaluation: bedside  Patient participation: complete - patient participated  Level of consciousness: awake  Pain management: adequate  Airway patency: patent  Anesthetic complications: no  Cardiovascular status: acceptable  Respiratory status: spontaneous ventilation and acceptable  Hydration status: acceptable  Post anesthesia nausea and vomiting:  none      INITIAL Post-op Vital signs:   Vitals Value Taken Time   /72 10/22/21 1035   Temp     Pulse 94 10/22/21 1053   Resp 16 10/22/21 1034   SpO2 100 % 10/22/21 1053   Vitals shown include unvalidated device data.

## 2021-10-22 NOTE — PROCEDURES
Esophagogastroduodenoscopy Procedure Note      Patient: Laura Calhoun Sr. MRN: [id number]     YOB: 1945  Age: 77 y.o. Sex: male           Procedure in Detail:  Informed consent was obtained for the procedure, the patient was brought to the endoscopy suite and sedation was induced by anesthesia. The endoscope was inserted into the mouth and advanced under direct vision to duodenal bulb. A careful inspection was made as the gastroscope was withdrawn, including a retroflexed view of the proximal stomach; findings and interventions are described below, with appropriate photodocumentation obtained. Pt with endoscopic appearance of massive hiatal hernia with intrathoracic stomach; previous CT reviewed revealing severe eventration of left hemidiaphragm. Findings:   Oropharynx:  Normal  Esophagus:  Normal  Cardia of the stomach:  Normal  Body of the stomach:  Normal  Fundus of the stomach:  Normal  Antrum of the stomach:    - Flat lesions:     - mild gastritis  Duodenum:  Normal     - unable to advance scope through pylorus due to looping resulting from severe left hemidiaphragm eventration; visualtization of bulb through pylorus appears normal    Therapies:    biopsy of stomach pre pyloric antrum    EBL-  minimal    Specimens: Specimens were collected and sent to pathology. Complications:   None; patient tolerated the procedure well. Recommendations:  - Await pathology.   - Await LIBORIO test result and treat for Helicobacter pylori if positive.  -Colonoscopy    Signed by: Alexandra Mathew MD                         October 22, 2021

## 2021-10-22 NOTE — ANESTHESIA PREPROCEDURE EVALUATION
Anesthetic History               Review of Systems / Medical History  Patient summary reviewed and pertinent labs reviewed    Pulmonary                   Neuro/Psych              Cardiovascular    Hypertension: well controlled        Dysrhythmias (s/p a-flutter ablation)   Past MI (NSTEMI 2015), CAD, cardiac stents (1/2021), CABG (2015) and hyperlipidemia    Exercise tolerance: >4 METS     GI/Hepatic/Renal     GERD: well controlled           Endo/Other        Arthritis and cancer (Prostate)     Other Findings   Comments: L hemidiaphragmatic paralysis           Physical Exam    Airway  Mallampati: II  TM Distance: > 6 cm  Neck ROM: decreased range of motion   Mouth opening: Normal     Cardiovascular  Regular rate and rhythm,  S1 and S2 normal,  no murmur, click, rub, or gallop             Dental    Dentition: Full upper dentures and Full lower dentures     Pulmonary  Breath sounds clear to auscultation               Abdominal  GI exam deferred       Other Findings            Anesthetic Plan    ASA: 3  Anesthesia type: total IV anesthesia            Anesthetic plan and risks discussed with: Patient

## 2021-10-22 NOTE — PROCEDURES
Procedure in Detail:  Informed consent was obtained for the procedure. The patient was placed in the left lateral decubitus position and sedation was induced by anesthesia. The scope was inserted into the rectum and advanced under direct vision to the cecum, which was identified by the ileocecal valve and appendiceal orifice. The quality of the colonic preparation was good. A careful inspection was made as the colonoscope was withdrawn, including a retroflexed view of the rectum; findings and interventions are described below. Appropriate photodocumentation was obtained. Findings:   Rectum:   Normal  Sigmoid:     - Excavated lesions:     - Diverticulosis  Descending Colon:     - Excavated lesions:     - Diverticulum  Transverse Colon:     - Protruding lesions:     -splenic flexure flat Polyp(s) size 3 mm removed by polypectomy (hot biopsy)  Ascending Colon:     - Excavated lesions:     - Diverticulum  Cecum:   Normal          Specimens: Specimens were collected and sent to pathology. Complications: None; patient tolerated the procedure well. \    EBL - none    Recommendations:   - Await pathology.    - no endoscopic source of chronic (now stable) anemia  -resume plavix WED 10/27     Signed By: Sanam Brown MD                        October 22, 2021

## 2021-10-23 LAB
H PYLORI AG TISS QL IMSTN: NEGATIVE
H PYLORI AG TISS QL IMSTN: NEGATIVE

## 2022-03-18 PROBLEM — K21.00 GASTROESOPHAGEAL REFLUX DISEASE WITH ESOPHAGITIS: Status: ACTIVE | Noted: 2020-08-27

## 2022-03-19 PROBLEM — I25.10 CAD (CORONARY ARTERY DISEASE): Status: ACTIVE | Noted: 2021-01-29

## 2022-03-19 PROBLEM — I48.3 TYPICAL ATRIAL FLUTTER (HCC): Status: ACTIVE | Noted: 2021-08-12

## 2022-03-19 PROBLEM — Z95.1 HX OF CABG: Status: ACTIVE | Noted: 2018-01-03

## 2022-03-20 PROBLEM — I35.8 AORTIC VALVE SCLEROSIS: Status: ACTIVE | Noted: 2018-01-03

## 2022-03-20 PROBLEM — I48.20 CHRONIC ATRIAL FIBRILLATION, UNSPECIFIED (HCC): Status: ACTIVE | Noted: 2021-08-12

## 2022-03-20 PROBLEM — C61 PROSTATE CANCER (HCC): Status: ACTIVE | Noted: 2020-02-05

## 2022-06-23 ENCOUNTER — OFFICE VISIT (OUTPATIENT)
Dept: PULMONOLOGY | Age: 77
End: 2022-06-23
Payer: COMMERCIAL

## 2022-06-23 VITALS
OXYGEN SATURATION: 98 % | BODY MASS INDEX: 30.23 KG/M2 | WEIGHT: 192.6 LBS | HEIGHT: 67 IN | RESPIRATION RATE: 18 BRPM | SYSTOLIC BLOOD PRESSURE: 116 MMHG | DIASTOLIC BLOOD PRESSURE: 82 MMHG | HEART RATE: 63 BPM

## 2022-06-23 DIAGNOSIS — J44.9 CHRONIC OBSTRUCTIVE PULMONARY DISEASE, UNSPECIFIED COPD TYPE (HCC): Primary | ICD-10-CM

## 2022-06-23 DIAGNOSIS — Z87.891 FORMER TOBACCO USE: ICD-10-CM

## 2022-06-23 PROCEDURE — 1123F ACP DISCUSS/DSCN MKR DOCD: CPT | Performed by: NURSE PRACTITIONER

## 2022-06-23 PROCEDURE — 1036F TOBACCO NON-USER: CPT | Performed by: NURSE PRACTITIONER

## 2022-06-23 PROCEDURE — G8427 DOCREV CUR MEDS BY ELIG CLIN: HCPCS | Performed by: NURSE PRACTITIONER

## 2022-06-23 PROCEDURE — 3023F SPIROM DOC REV: CPT | Performed by: NURSE PRACTITIONER

## 2022-06-23 PROCEDURE — G8417 CALC BMI ABV UP PARAM F/U: HCPCS | Performed by: NURSE PRACTITIONER

## 2022-06-23 PROCEDURE — 99214 OFFICE O/P EST MOD 30 MIN: CPT | Performed by: NURSE PRACTITIONER

## 2022-06-23 RX ORDER — BUDESONIDE, GLYCOPYRROLATE, AND FORMOTEROL FUMARATE 160; 9; 4.8 UG/1; UG/1; UG/1
2 AEROSOL, METERED RESPIRATORY (INHALATION) 2 TIMES DAILY
Qty: 1 EACH | Refills: 11 | Status: SHIPPED | OUTPATIENT
Start: 2022-06-23 | End: 2022-06-23

## 2022-06-23 RX ORDER — OMEGA-3/DHA/EPA/FISH OIL 300-1000MG
2 CAPSULE ORAL
COMMUNITY

## 2022-06-23 RX ORDER — BUDESONIDE, GLYCOPYRROLATE, AND FORMOTEROL FUMARATE 160; 9; 4.8 UG/1; UG/1; UG/1
2 AEROSOL, METERED RESPIRATORY (INHALATION) 2 TIMES DAILY
Qty: 3 EACH | Refills: 4 | Status: SHIPPED | OUTPATIENT
Start: 2022-06-23 | End: 2022-09-22 | Stop reason: ALTCHOICE

## 2022-06-23 RX ORDER — VITAMIN B COMPLEX
TABLET ORAL DAILY
COMMUNITY

## 2022-07-25 ENCOUNTER — TELEPHONE (OUTPATIENT)
Dept: PULMONOLOGY | Age: 77
End: 2022-07-25

## 2022-07-25 NOTE — TELEPHONE ENCOUNTER
Patient is calling states he can not afford the medicine:    Budeson-Glycopyrrol-Formoterol (BREZTRI AEROSPHERE) 160-9-4.8 MCG/ACT AERO     Patient has requested list of formulary drugs but it will be 10-14 days before he receives this

## 2022-08-18 ENCOUNTER — NURSE ONLY (OUTPATIENT)
Dept: FAMILY MEDICINE CLINIC | Facility: CLINIC | Age: 77
End: 2022-08-18
Payer: COMMERCIAL

## 2022-08-18 DIAGNOSIS — I10 ESSENTIAL HYPERTENSION WITH GOAL BLOOD PRESSURE LESS THAN 140/90: ICD-10-CM

## 2022-08-18 DIAGNOSIS — Z00.00 LABORATORY EXAMINATION ORDERED AS PART OF A ROUTINE GENERAL MEDICAL EXAMINATION: Primary | ICD-10-CM

## 2022-08-18 DIAGNOSIS — C61 PROSTATE CANCER (HCC): ICD-10-CM

## 2022-08-18 DIAGNOSIS — E55.9 VITAMIN D DEFICIENCY: ICD-10-CM

## 2022-08-18 DIAGNOSIS — R97.20 ELEVATED PSA: ICD-10-CM

## 2022-08-18 DIAGNOSIS — E78.00 PURE HYPERCHOLESTEROLEMIA: ICD-10-CM

## 2022-08-18 LAB
ALBUMIN SERPL-MCNC: 3.7 G/DL (ref 3.2–4.6)
ALBUMIN/GLOB SERPL: 1.1 {RATIO} (ref 1.2–3.5)
ALP SERPL-CCNC: 74 U/L (ref 50–136)
ALT SERPL-CCNC: 18 U/L (ref 12–65)
ANION GAP SERPL CALC-SCNC: 0 MMOL/L (ref 7–16)
AST SERPL-CCNC: 20 U/L (ref 15–37)
BACTERIA URINE, POC: ABNORMAL
BASOPHILS # BLD: 0 K/UL (ref 0–0.2)
BASOPHILS NFR BLD: 1 % (ref 0–2)
BILIRUB SERPL-MCNC: 0.4 MG/DL (ref 0.2–1.1)
BILIRUBIN, URINE, POC: NEGATIVE
BLOOD URINE, POC: ABNORMAL
BUN SERPL-MCNC: 14 MG/DL (ref 8–23)
CALCIUM SERPL-MCNC: 8.9 MG/DL (ref 8.3–10.4)
CASTS URINE, POC: ABNORMAL
CHLORIDE SERPL-SCNC: 104 MMOL/L (ref 98–107)
CHOLEST SERPL-MCNC: 144 MG/DL
CO2 SERPL-SCNC: 30 MMOL/L (ref 21–32)
CREAT SERPL-MCNC: 0.7 MG/DL (ref 0.8–1.5)
DIFFERENTIAL METHOD BLD: ABNORMAL
EOSINOPHIL # BLD: 0.1 K/UL (ref 0–0.8)
EOSINOPHIL NFR BLD: 4 % (ref 0.5–7.8)
EPI CELLS URINE, POC: ABNORMAL
ERYTHROCYTE [DISTWIDTH] IN BLOOD BY AUTOMATED COUNT: 13.6 % (ref 11.9–14.6)
GLOBULIN SER CALC-MCNC: 3.3 G/DL (ref 2.3–3.5)
GLUCOSE SERPL-MCNC: 92 MG/DL (ref 65–100)
GLUCOSE URINE, POC: NEGATIVE
HCT VFR BLD AUTO: 39.1 % (ref 41.1–50.3)
HDLC SERPL-MCNC: 66 MG/DL (ref 40–60)
HDLC SERPL: 2.2 {RATIO}
HGB BLD-MCNC: 12.2 G/DL (ref 13.6–17.2)
IMM GRANULOCYTES # BLD AUTO: 0 K/UL (ref 0–0.5)
IMM GRANULOCYTES NFR BLD AUTO: 1 % (ref 0–5)
KETONES, URINE, POC: NEGATIVE
LDLC SERPL CALC-MCNC: 67.8 MG/DL
LEUKOCYTE ESTERASE, URINE, POC: ABNORMAL
LYMPHOCYTES # BLD: 0.7 K/UL (ref 0.5–4.6)
LYMPHOCYTES NFR BLD: 22 % (ref 13–44)
MCH RBC QN AUTO: 29.9 PG (ref 26.1–32.9)
MCHC RBC AUTO-ENTMCNC: 31.2 G/DL (ref 31.4–35)
MCV RBC AUTO: 95.8 FL (ref 79.6–97.8)
MONOCYTES # BLD: 0.6 K/UL (ref 0.1–1.3)
MONOCYTES NFR BLD: 17 % (ref 4–12)
NEUTS SEG # BLD: 1.8 K/UL (ref 1.7–8.2)
NEUTS SEG NFR BLD: 55 % (ref 43–78)
NITRITE, URINE, POC: POSITIVE
NRBC # BLD: 0 K/UL (ref 0–0.2)
PH, URINE, POC: 6 (ref 4.6–8)
PLATELET # BLD AUTO: 166 K/UL (ref 150–450)
PMV BLD AUTO: 9.4 FL (ref 9.4–12.3)
POTASSIUM SERPL-SCNC: 4 MMOL/L (ref 3.5–5.1)
PROT SERPL-MCNC: 7 G/DL (ref 6.3–8.2)
PROTEIN,URINE, POC: NEGATIVE
PSA SERPL-MCNC: 0.1 NG/ML
RBC # BLD AUTO: 4.08 M/UL (ref 4.23–5.6)
RBC, URINE, POC: ABNORMAL
SODIUM SERPL-SCNC: 134 MMOL/L (ref 136–145)
SPECIFIC GRAVITY, URINE, POC: 1.01 (ref 1–1.03)
TRICHOMONAS URINE, POC: ABNORMAL
TRIGL SERPL-MCNC: 51 MG/DL (ref 35–150)
TSH, 3RD GENERATION: 0.8 UIU/ML (ref 0.36–3.74)
URINALYSIS CLARITY, POC: CLEAR
URINALYSIS COLOR, POC: YELLOW
UROBILINOGEN, POC: NORMAL
VLDLC SERPL CALC-MCNC: 10.2 MG/DL (ref 6–23)
WBC # BLD AUTO: 3.3 K/UL (ref 4.3–11.1)
WBC, URINE, POC: ABNORMAL
YEAST, URINE, POC: ABNORMAL

## 2022-08-18 PROCEDURE — 81000 URINALYSIS NONAUTO W/SCOPE: CPT | Performed by: FAMILY MEDICINE

## 2022-08-20 LAB — 25(OH)D3 SERPL-MCNC: 29.4 NG/ML (ref 30–100)

## 2022-08-23 ENCOUNTER — OFFICE VISIT (OUTPATIENT)
Dept: FAMILY MEDICINE CLINIC | Facility: CLINIC | Age: 77
End: 2022-08-23
Payer: MEDICARE

## 2022-08-23 VITALS
DIASTOLIC BLOOD PRESSURE: 90 MMHG | BODY MASS INDEX: 30.61 KG/M2 | HEIGHT: 67 IN | SYSTOLIC BLOOD PRESSURE: 140 MMHG | WEIGHT: 195 LBS

## 2022-08-23 DIAGNOSIS — I48.3 TYPICAL ATRIAL FLUTTER (HCC): ICD-10-CM

## 2022-08-23 DIAGNOSIS — K21.9 GASTRO-ESOPHAGEAL REFLUX DISEASE WITHOUT ESOPHAGITIS: ICD-10-CM

## 2022-08-23 DIAGNOSIS — I25.10 CAD IN NATIVE ARTERY: ICD-10-CM

## 2022-08-23 DIAGNOSIS — E78.00 PURE HYPERCHOLESTEROLEMIA: ICD-10-CM

## 2022-08-23 DIAGNOSIS — R30.0 DYSURIA: ICD-10-CM

## 2022-08-23 DIAGNOSIS — D50.9 IRON DEFICIENCY ANEMIA, UNSPECIFIED IRON DEFICIENCY ANEMIA TYPE: ICD-10-CM

## 2022-08-23 DIAGNOSIS — I10 ESSENTIAL HYPERTENSION WITH GOAL BLOOD PRESSURE LESS THAN 140/90: ICD-10-CM

## 2022-08-23 DIAGNOSIS — Z00.00 MEDICARE ANNUAL WELLNESS VISIT, SUBSEQUENT: Primary | ICD-10-CM

## 2022-08-23 DIAGNOSIS — Z13.31 SCREENING FOR DEPRESSION: ICD-10-CM

## 2022-08-23 DIAGNOSIS — Z00.00 ROUTINE GENERAL MEDICAL EXAMINATION AT A HEALTH CARE FACILITY: ICD-10-CM

## 2022-08-23 LAB
BILIRUBIN, URINE, POC: NEGATIVE
BLOOD URINE, POC: ABNORMAL
GLUCOSE URINE, POC: NEGATIVE
KETONES, URINE, POC: NEGATIVE
LEUKOCYTE ESTERASE, URINE, POC: ABNORMAL
NITRITE, URINE, POC: POSITIVE
PH, URINE, POC: 6 (ref 4.6–8)
PROTEIN,URINE, POC: NEGATIVE
SPECIFIC GRAVITY, URINE, POC: 1.01 (ref 1–1.03)
URINALYSIS CLARITY, POC: CLEAR
URINALYSIS COLOR, POC: YELLOW
UROBILINOGEN, POC: 1

## 2022-08-23 PROCEDURE — 1123F ACP DISCUSS/DSCN MKR DOCD: CPT | Performed by: FAMILY MEDICINE

## 2022-08-23 PROCEDURE — G0439 PPPS, SUBSEQ VISIT: HCPCS | Performed by: FAMILY MEDICINE

## 2022-08-23 PROCEDURE — 81003 URINALYSIS AUTO W/O SCOPE: CPT | Performed by: FAMILY MEDICINE

## 2022-08-23 RX ORDER — FERROUS SULFATE 325(65) MG
325 TABLET ORAL
Qty: 90 TABLET | Refills: 3 | Status: SHIPPED | OUTPATIENT
Start: 2022-08-23

## 2022-08-23 RX ORDER — CLOPIDOGREL BISULFATE 75 MG/1
75 TABLET ORAL DAILY
Qty: 90 TABLET | Refills: 3 | Status: SHIPPED | OUTPATIENT
Start: 2022-08-23

## 2022-08-23 RX ORDER — DOXAZOSIN 8 MG/1
8 TABLET ORAL 2 TIMES DAILY
Qty: 180 TABLET | Refills: 3 | Status: SHIPPED | OUTPATIENT
Start: 2022-08-23

## 2022-08-23 RX ORDER — PRAVASTATIN SODIUM 80 MG/1
80 TABLET ORAL DAILY
Qty: 90 TABLET | Refills: 3 | Status: SHIPPED | OUTPATIENT
Start: 2022-08-23 | End: 2022-09-08 | Stop reason: SDUPTHER

## 2022-08-23 RX ORDER — OMEPRAZOLE 20 MG/1
20 CAPSULE, DELAYED RELEASE ORAL DAILY
Qty: 90 CAPSULE | Refills: 3 | Status: SHIPPED | OUTPATIENT
Start: 2022-08-23

## 2022-08-23 RX ORDER — LISINOPRIL 20 MG/1
20 TABLET ORAL DAILY
Qty: 90 TABLET | Refills: 3 | Status: SHIPPED | OUTPATIENT
Start: 2022-08-23

## 2022-08-23 ASSESSMENT — ENCOUNTER SYMPTOMS
SHORTNESS OF BREATH: 0
DIARRHEA: 0
COUGH: 0
CONSTIPATION: 0
SORE THROAT: 0
VOMITING: 0
NAUSEA: 0
ABDOMINAL PAIN: 0
WHEEZING: 0

## 2022-08-23 ASSESSMENT — PATIENT HEALTH QUESTIONNAIRE - PHQ9
SUM OF ALL RESPONSES TO PHQ QUESTIONS 1-9: 0
SUM OF ALL RESPONSES TO PHQ QUESTIONS 1-9: 0
2. FEELING DOWN, DEPRESSED OR HOPELESS: 0
SUM OF ALL RESPONSES TO PHQ QUESTIONS 1-9: 0
1. LITTLE INTEREST OR PLEASURE IN DOING THINGS: 0
SUM OF ALL RESPONSES TO PHQ QUESTIONS 1-9: 0
SUM OF ALL RESPONSES TO PHQ9 QUESTIONS 1 & 2: 0

## 2022-08-23 NOTE — PATIENT INSTRUCTIONS
Personalized Preventive Plan for Wayne Lugo Sr. - 8/23/2022  Medicare offers a range of preventive health benefits. Some of the tests and screenings are paid in full while other may be subject to a deductible, co-insurance, and/or copay. Some of these benefits include a comprehensive review of your medical history including lifestyle, illnesses that may run in your family, and various assessments and screenings as appropriate. After reviewing your medical record and screening and assessments performed today your provider may have ordered immunizations, labs, imaging, and/or referrals for you. A list of these orders (if applicable) as well as your Preventive Care list are included within your After Visit Summary for your review. Other Preventive Recommendations:    A preventive eye exam performed by an eye specialist is recommended every 1-2 years to screen for glaucoma; cataracts, macular degeneration, and other eye disorders. A preventive dental visit is recommended every 6 months. Try to get at least 150 minutes of exercise per week or 10,000 steps per day on a pedometer . Order or download the FREE \"Exercise & Physical Activity: Your Everyday Guide\" from [employer]. Call 5-866.230.3487 or search [employer] online. You need 7733-2463 mg of calcium and 1019-4051 IU of vitamin D per day. It is possible to meet your calcium requirement with diet alone, but a vitamin D supplement is usually necessary to meet this goal.  When exposed to the sun, use a sunscreen that protects against both UVA and UVB radiation with an SPF of 30 or greater. Reapply every 2 to 3 hours or after sweating, drying off with a towel, or swimming. Always wear a seat belt when traveling in a car. Always wear a helmet when riding a bicycle or motorcycle.

## 2022-08-23 NOTE — PROGRESS NOTES
PROGRESS NOTE    SUBJECTIVE:   Kalli Navarro Sr. is a 69 y.o. male seen for a follow up visit regarding the following chief complaint:     Chief Complaint   Patient presents with    Medicare AWV     subsequent    Discuss Labs    Rectal Bleeding     Occasional rectal bleeding, colonoscopy 2021           HPI presents office for a Medicare wellness visit and to go over his lab states he had some rectal bleeding the other day with a bowel movement due for his colonoscopy in 4 years last one was \"normal\" also complaining of a hernia in his right upper abdominal area and Dr. Jaylen Martinez told him to get his other family affairs in order apparently his daughter is very sick but once he starts feeling better he can call Dr. Jaylen Martinez and schedule for his hernia surgery      Past Medical History, Past Surgical History, Family history, Social History, and Medications were all reviewed with the patient today and updated as necessary. Current Outpatient Medications   Medication Sig Dispense Refill    clopidogrel (PLAVIX) 75 MG tablet Take 1 tablet by mouth daily 90 tablet 3    doxazosin (CARDURA) 8 MG tablet Take 1 tablet by mouth 2 times daily 180 tablet 3    ferrous sulfate (IRON 325) 325 (65 Fe) MG tablet Take 1 tablet by mouth every morning (before breakfast) 90 tablet 3    lisinopril (PRINIVIL;ZESTRIL) 20 MG tablet Take 1 tablet by mouth daily Take 1 tablet by mouth twice a day for hypertension 90 tablet 3    metoprolol tartrate (LOPRESSOR) 25 MG tablet Take 0.5 tablets by mouth in the morning and 0.5 tablets in the evening.  180 tablet 3    omeprazole (PRILOSEC) 20 MG delayed release capsule Take 1 capsule by mouth Daily TAKE 1 CAPSULE TWO TIMES A DAY FOR GASTROESOPHAGEAL REFLUX 90 capsule 3    potassium gluconate 550 mg tablet Take 1 tab  tablet 3    pravastatin (PRAVACHOL) 80 MG tablet Take 1 tablet by mouth daily 90 tablet 3    fish oil-omega-3 fatty acids 1000 MG capsule Take 2 tablets by mouth      zinc 50 MG 2/20 barium swallow on 2/12/15 that revealed an unremarkable esophagus and paralyzed left diaphragm with an unusual rotation of the stomach in the left upper quadrant  esophagram 2/12/2015 which revealed an abnormal contour the left hemidiaphragm, suggesting diaphragmatic hernia. 2/20 Barium Swallow Barium swallow today to assess for any signs of possible torsion.  If no torsion present, will likel    Elevated hemoglobin A1c 2/20/2015    6.1 - 2/17/15     Full dentures 2/21/2015    GERD (gastroesophageal reflux disease)     controlled with med    Hernia of abdominal cavity 6/26/2015    History of atrial flutter 8/2015    ablation    History of complete eye exam 01/01/2015    [facility]    Hypercholesterolemia     Hyperlipidemia 2/18/2015    Hyperlipidemia     Hypertension     controlled with med    Hypoxemia 2/21/2015    MI, old 2/2015    NSTEMI (non-ST elevated myocardial infarction) ([facility] [address] 2/14/2015    OA (osteoarthritis) 2/21/2015    Pleural effusion on right 2/21/2015    Postoperative anemia due to acute blood loss 2/19/2015    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 Ortiz) R [facility] Radiofrequency ablation     Wears dentures      Past Surgical History:   Procedure Laterality Date    CABG, ARTERY-VEIN, TWO  2015    Dr. Negron Bolds  2/2015 and 8/15    CARDIAC CATHETERIZATION  8/2015     [id number]    COLONOSCOPY N/A 10/22/2021    COLONOSCOPY/BMI 30 performed by Cullen Litten, MD at [address] [place] [address] N/A 3/9/2018    COLONOSCOPY/ 28 performed by Jamil Pond MD at [address] [place] [address]  2012    COLSC FLX W/REMOVAL LESION BY HOT BX FORCEPS  10/22/2021         COLSC FLX W/REMOVAL LESION BY HOT BX FORCEPS  3/9/2018         CORONARY ANGIOPLASTY WITH STENT PLACEMENT  2021    Two 3.0 x 12 Clem drug-eluting stents    CORONARY ARTERY BYPASS GRAFT  2/15    DR. Pimentel    EGD TRANSORAL BIOPSY SINGLE/MULTIPLE  10/22/2021         FRACTURE SURGERY Right 2007    elbow fx    HERNIA REPAIR  0078    umbilical    HERNIA REPAIR Left unsure of 2nd surgery    LIH and redo embilical, Both Repairs at same time    KNEE ARTHROSCOPY Left     left knee    LEFT HEART CATH,PERCUTANEOUS  2021    angioplasty and stenting of the two high-grade lesions in the circumflex    ORTHOPEDIC SURGERY Right 2007    right elbow fx    VEIN SURGERY       Family History   Problem Relation Age of Onset    Heart Disease Father     Hypertension Brother     No Known Problems Mother     Heart Disease Brother     Hypertension Brother     Hypertension Sister      Social History     Tobacco Use    Smoking status: Former     Packs/day: 0.25     Years: 20.00     Pack years: 5.00     Types: Cigarettes     Quit date: 2001     Years since quittin.3     Passive exposure: Past    Smokeless tobacco: Never   Substance Use Topics    Alcohol use: No         Review of Systems   Constitutional:  Negative for chills and fever. HENT:  Negative for sore throat. Eyes:  Negative for visual disturbance. Respiratory:  Negative for cough, shortness of breath and wheezing. Cardiovascular:  Negative for chest pain and palpitations. Gastrointestinal:  Negative for abdominal pain, constipation, diarrhea, nausea and vomiting. Endocrine: Negative for cold intolerance and heat intolerance. Genitourinary:  Negative for decreased urine volume, dysuria, penile discharge and testicular pain. Musculoskeletal:  Negative for arthralgias and myalgias. Skin:  Negative for rash. Neurological:  Negative for weakness and light-headedness. Psychiatric/Behavioral: Negative.          OBJECTIVE:  BP (!) 140/90 (Site: Left Upper Arm, Position: Sitting, Cuff Size: Small Adult)   Ht 5' 7\" (1.702 m)   Wt 195 lb (88.5 kg)   BMI 30.54 kg/m²      Physical Exam  Vitals and nursing note reviewed. Constitutional:       Appearance: Normal appearance. HENT:      Head: Normocephalic and atraumatic. Right Ear: Tympanic membrane normal.      Left Ear: Tympanic membrane normal.      Nose: Nose normal.      Mouth/Throat:      Mouth: Mucous membranes are moist.      Pharynx: No oropharyngeal exudate or posterior oropharyngeal erythema. Eyes:      Extraocular Movements: Extraocular movements intact. Conjunctiva/sclera: Conjunctivae normal.      Pupils: Pupils are equal, round, and reactive to light. Cardiovascular:      Rate and Rhythm: Normal rate and regular rhythm. Pulses: Normal pulses. Heart sounds: Normal heart sounds. Pulmonary:      Effort: Pulmonary effort is normal.      Breath sounds: Normal breath sounds. Abdominal:      General: Abdomen is flat. Bowel sounds are normal.      Palpations: Abdomen is soft. Hernia: A hernia is present. Hernia is present in the ventral area. Genitourinary:     Penis: Normal.       Testes: Normal.      Prostate: Normal.      Rectum: Normal. Guaiac result negative. Musculoskeletal:         General: Normal range of motion. Cervical back: Normal range of motion and neck supple. Skin:     General: Skin is warm and dry. Capillary Refill: Capillary refill takes less than 2 seconds. Neurological:      General: No focal deficit present. Mental Status: He is alert and oriented to person, place, and time. Psychiatric:         Mood and Affect: Mood normal.         Behavior: Behavior normal.         Thought Content: Thought content normal.         Judgment: Judgment normal.        Medical problems and test results were reviewed with the patient today.      Recent Results (from the past 672 hour(s))   AMB POC URINALYSIS DIP STICK MANUAL W/ MICRO [facility]    Collection Time: 08/18/22 10:00 AM   Result Value Ref Range    Color (UA POC) Yellow     Clarity Chol/HDL Ratio 2.2     CBC with Auto Differential    Collection Time: 08/18/22 12:19 PM   Result Value Ref Range    WBC 3.3 (L) 4.3 - 11.1 K/uL    RBC 4.08 (L) 4.23 - 5.6 M/uL    Hemoglobin 12.2 (L) 13.6 - 17.2 g/dL    Hematocrit 39.1 (L) 41.1 - 50.3 %    MCV 95.8 79.6 - 97.8 FL    MCH 29.9 26.1 - 32.9 PG    MCHC 31.2 (L) 31.4 - 35.0 g/dL    RDW 13.6 11.9 - 14.6 %    Platelets 533 079 - 136 K/uL    MPV 9.4 9.4 - 12.3 FL    nRBC 0.00 0.0 - 0.2 K/uL    Differential Type AUTOMATED      Seg Neutrophils 55 43 - 78 %    Lymphocytes 22 13 - 44 %    Monocytes 17 (H) 4.0 - 12.0 %    Eosinophils % 4 0.5 - 7.8 %    Basophils 1 0.0 - 2.0 %    Immature Granulocytes 1 0.0 - 5.0 %    Segs Absolute 1.8 1.7 - 8.2 K/UL    Absolute Lymph # 0.7 0.5 - 4.6 K/UL    Absolute Mono # 0.6 0.1 - 1.3 K/UL    Absolute Eos # 0.1 0.0 - 0.8 K/UL    Basophils Absolute 0.0 0.0 - 0.2 K/UL    Absolute Immature Granulocyte 0.0 0.0 - 0.5 K/UL   AMB POC URINALYSIS DIP STICK AUTO W/O MICRO    Collection Time: 08/23/22  5:01 PM   Result Value Ref Range    Color, Urine, POC yellow     Clarity, Urine, POC clear     Glucose, Urine, POC Negative Negative    Bilirubin, Urine, POC Negative Negative    Ketones, Urine, POC Negative Negative    Specific Gravity, Urine, POC 1.015 1.001 - 1.035    Blood, Urine, POC trace Negative    pH, Urine, POC 6.0 4.6 - 8.0    Protein, Urine, POC Negative Negative    Urobilinogen, POC 1.0     Nitrate, Urine, POC positive Negative    Leukocyte Esterase, Urine, POC 1+ Negative       ASSESSMENT and PLAN    Visit Diagnoses and Associated Orders       Essential hypertension with goal blood pressure less than 140/90    -  Primary    doxazosin (CARDURA) 8 MG tablet [9897]      lisinopril (PRINIVIL;ZESTRIL) 20 MG tablet [1287]      metoprolol tartrate (LOPRESSOR) 25 MG tablet [38860]      potassium gluconate 550 mg tablet [9373]           Pure hypercholesterolemia        pravastatin (PRAVACHOL) 80 MG tablet [71914] Gastro-esophageal reflux disease without esophagitis        omeprazole (PRILOSEC) 20 MG delayed release capsule [77719]           Typical atrial flutter (HCC)        clopidogrel (PLAVIX) 75 MG tablet [94808]           CAD in native artery        clopidogrel (PLAVIX) 75 MG tablet [10147]           Iron deficiency anemia, unspecified iron deficiency anemia type        ferrous sulfate (IRON 325) 325 (65 Fe) MG tablet [3074]           Dysuria        AMB POC URINALYSIS DIP STICK AUTO W/O MICRO [28608 CPT(R)]           Routine general medical examination at a health care facility             Screening for depression             Medicare annual wellness visit, subsequent                         Diagnosis Orders   1. Essential hypertension with goal blood pressure less than 140/90  doxazosin (CARDURA) 8 MG tablet    lisinopril (PRINIVIL;ZESTRIL) 20 MG tablet    metoprolol tartrate (LOPRESSOR) 25 MG tablet    potassium gluconate 550 mg tablet      2. Pure hypercholesterolemia  pravastatin (PRAVACHOL) 80 MG tablet      3. Gastro-esophageal reflux disease without esophagitis  omeprazole (PRILOSEC) 20 MG delayed release capsule      4. Typical atrial flutter (HCC)  clopidogrel (PLAVIX) 75 MG tablet      5. CAD in native artery  clopidogrel (PLAVIX) 75 MG tablet      6. Iron deficiency anemia, unspecified iron deficiency anemia type  ferrous sulfate (IRON 325) 325 (65 Fe) MG tablet      7. Dysuria  AMB POC URINALYSIS DIP STICK AUTO W/O MICRO      8. Routine general medical examination at a health care facility        9. Screening for depression        10. Medicare annual wellness visit, subsequent        , Vik Jama was seen today for medicare awv, discuss labs and rectal bleeding. Diagnoses and all orders for this visit:    Essential hypertension with goal blood pressure less than 140/90  -     doxazosin (CARDURA) 8 MG tablet; Take 1 tablet by mouth 2 times daily  -     lisinopril (PRINIVIL;ZESTRIL) 20 MG tablet;  Take 1 tablet by mouth daily Take 1 tablet by mouth twice a day for hypertension  -     metoprolol tartrate (LOPRESSOR) 25 MG tablet; Take 0.5 tablets by mouth in the morning and 0.5 tablets in the evening.  -     potassium gluconate 550 mg tablet; Take 1 tab BID    Pure hypercholesterolemia  -     pravastatin (PRAVACHOL) 80 MG tablet; Take 1 tablet by mouth daily    Gastro-esophageal reflux disease without esophagitis  -     omeprazole (PRILOSEC) 20 MG delayed release capsule; Take 1 capsule by mouth Daily TAKE 1 CAPSULE TWO TIMES A DAY FOR GASTROESOPHAGEAL REFLUX    Typical atrial flutter (HCC)  -     clopidogrel (PLAVIX) 75 MG tablet; Take 1 tablet by mouth daily    CAD in native artery  -     clopidogrel (PLAVIX) 75 MG tablet; Take 1 tablet by mouth daily    Iron deficiency anemia, unspecified iron deficiency anemia type  -     ferrous sulfate (IRON 325) 325 (65 Fe) MG tablet;  Take 1 tablet by mouth every morning (before breakfast)    Dysuria  -     AMB POC URINALYSIS DIP STICK AUTO W/O MICRO    Routine general medical examination at a health care facility    Screening for depression    Medicare annual wellness visit, subsequent    , Reviewed his labs answered all his questions repeated a urine which came back showing he has urinary tract infection we will start him on Cipro 500 mg 1 p.o. twice daily #14 prescription was written and handed to him along with his other prescriptions supportive care given we will follow-up and see him next year for his physical unless he has other problems or complications call back sooner

## 2022-09-08 ENCOUNTER — OFFICE VISIT (OUTPATIENT)
Dept: FAMILY MEDICINE CLINIC | Facility: CLINIC | Age: 77
End: 2022-09-08
Payer: MEDICARE

## 2022-09-08 VITALS
HEIGHT: 67 IN | DIASTOLIC BLOOD PRESSURE: 80 MMHG | SYSTOLIC BLOOD PRESSURE: 136 MMHG | WEIGHT: 198 LBS | BODY MASS INDEX: 31.08 KG/M2

## 2022-09-08 DIAGNOSIS — E78.00 PURE HYPERCHOLESTEROLEMIA: ICD-10-CM

## 2022-09-08 DIAGNOSIS — R30.0 DYSURIA: Primary | ICD-10-CM

## 2022-09-08 DIAGNOSIS — I10 ESSENTIAL HYPERTENSION WITH GOAL BLOOD PRESSURE LESS THAN 140/90: ICD-10-CM

## 2022-09-08 LAB
BILIRUBIN, URINE, POC: NEGATIVE
BLOOD URINE, POC: ABNORMAL
GLUCOSE URINE, POC: NEGATIVE
KETONES, URINE, POC: NEGATIVE
LEUKOCYTE ESTERASE, URINE, POC: ABNORMAL
NITRITE, URINE, POC: POSITIVE
PH, URINE, POC: 7.5 (ref 4.6–8)
PROTEIN,URINE, POC: NEGATIVE
SPECIFIC GRAVITY, URINE, POC: 1.02 (ref 1–1.03)
URINALYSIS CLARITY, POC: CLEAR
URINALYSIS COLOR, POC: YELLOW
UROBILINOGEN, POC: NORMAL

## 2022-09-08 PROCEDURE — 99213 OFFICE O/P EST LOW 20 MIN: CPT | Performed by: FAMILY MEDICINE

## 2022-09-08 PROCEDURE — 1123F ACP DISCUSS/DSCN MKR DOCD: CPT | Performed by: FAMILY MEDICINE

## 2022-09-08 PROCEDURE — 81003 URINALYSIS AUTO W/O SCOPE: CPT | Performed by: FAMILY MEDICINE

## 2022-09-08 RX ORDER — PRAVASTATIN SODIUM 80 MG/1
80 TABLET ORAL DAILY
Qty: 90 TABLET | Refills: 3 | Status: SHIPPED | OUTPATIENT
Start: 2022-09-08

## 2022-09-08 ASSESSMENT — PATIENT HEALTH QUESTIONNAIRE - PHQ9
1. LITTLE INTEREST OR PLEASURE IN DOING THINGS: 0
2. FEELING DOWN, DEPRESSED OR HOPELESS: 0
SUM OF ALL RESPONSES TO PHQ QUESTIONS 1-9: 0
SUM OF ALL RESPONSES TO PHQ9 QUESTIONS 1 & 2: 0
SUM OF ALL RESPONSES TO PHQ QUESTIONS 1-9: 0

## 2022-09-08 ASSESSMENT — ENCOUNTER SYMPTOMS
SHORTNESS OF BREATH: 0
NAUSEA: 0
VOMITING: 0

## 2022-09-08 NOTE — PROGRESS NOTES
PROGRESS NOTE    SUBJECTIVE:   Michael Porter Sr. is a 69 y.o. male seen for a follow up visit regarding the following chief complaint:     Chief Complaint   Patient presents with    Urinary Tract Infection     Follow up, asymptomatic           HPI patient presents office today follow-up of his UTI finished a round of antibiotics also needs refill on his medication and had some questions      Past Medical History, Past Surgical History, Family history, Social History, and Medications were all reviewed with the patient today and updated as necessary. Current Outpatient Medications   Medication Sig Dispense Refill    metoprolol tartrate (LOPRESSOR) 25 MG tablet Take 0.5 tablets by mouth in the morning and 0.5 tablets in the evening.  180 tablet 3    pravastatin (PRAVACHOL) 80 MG tablet Take 1 tablet by mouth daily 90 tablet 3    clopidogrel (PLAVIX) 75 MG tablet Take 1 tablet by mouth daily 90 tablet 3    doxazosin (CARDURA) 8 MG tablet Take 1 tablet by mouth 2 times daily 180 tablet 3    ferrous sulfate (IRON 325) 325 (65 Fe) MG tablet Take 1 tablet by mouth every morning (before breakfast) 90 tablet 3    lisinopril (PRINIVIL;ZESTRIL) 20 MG tablet Take 1 tablet by mouth daily Take 1 tablet by mouth twice a day for hypertension 90 tablet 3    omeprazole (PRILOSEC) 20 MG delayed release capsule Take 1 capsule by mouth Daily TAKE 1 CAPSULE TWO TIMES A DAY FOR GASTROESOPHAGEAL REFLUX 90 capsule 3    potassium gluconate 550 mg tablet Take 1 tab  tablet 3    fish oil-omega-3 fatty acids 1000 MG capsule Take 2 tablets by mouth      zinc 50 MG CAPS Take by mouth daily      Coenzyme Q10 (COQ10) 100 MG CAPS Take by mouth daily      Calcium Carbonate-Vit D-Min (CALCIUM 600+D3 PLUS MINERALS) 600-800 MG-UNIT CHEW Take 1 tablet by mouth daily      Multiple Vitamins-Minerals (ONE-A-DAY MENS 50+ ADVANTAGE PO) Take 1 tablet by mouth daily      Budeson-Glycopyrrol-Formoterol (BREZTRI AEROSPHERE) 160-9-4.8 MCG/ACT AERO Inhale 2 puffs into the lungs 2 times daily 3 each 4    SELENIUM PO Take by mouth daily      albuterol sulfate  (90 Base) MCG/ACT inhaler Inhale 2 puffs into the lungs every 6 hours as needed      ascorbic acid (VITAMIN C) 500 MG tablet Take by mouth daily      aspirin 81 MG EC tablet Take 81 mg by mouth daily      nitroGLYCERIN (NITROSTAT) 0.4 MG SL tablet Place 0.4 mg under the tongue      tamsulosin (FLOMAX) 0.4 MG capsule Take 0.4 mg by mouth 2 times daily       No current facility-administered medications for this visit. No Known Allergies  Patient Active Problem List   Diagnosis    Arthritis    Gastroesophageal reflux disease with esophagitis    Typical atrial flutter (HCC)    CAD in native artery    Acquired elevated diaphragm    Elevated PSA    CAD (coronary artery disease)    Hx of CABG    Pure hypercholesterolemia    Prostate cancer ([facility] [address]    Essential hypertension with goal blood pressure less than 140/90    Aortic valve sclerosis    Chronic atrial fibrillation, unspecified ([facility] [address]     Past Medical History:   Diagnosis Date    Arrhythmia 8/2015 at [facility]    A-Flutter ablation--- dr Francois Beckwith    Aspirin long-term use     CAD (coronary artery disease) 01/29/2021    PCI    CAD (coronary artery disease)     mi--2/2015 cabg 2/2015---- No stents    CAD in native artery 2/19/2015    2/19/15 (Dr Halley Sloan) Coronary artery bypass grafting x2 with grafts consisting of bilateral mammaries     Cancer ([facility] [address]     prostate    Elevated hemidiaphragm 2/21/2015 2/20 barium swallow on 2/12/15 that revealed an unremarkable esophagus and paralyzed left diaphragm with an unusual rotation of the stomach in the left upper quadrant  esophagram 2/12/2015 which revealed an abnormal contour the left hemidiaphragm, suggesting diaphragmatic hernia. 2/20 Barium Swallow Barium swallow today to assess for any signs of possible torsion.  If no torsion present, will likel    Elevated hemoglobin A1c 2/20/2015    6.1 - 2/17/15     Full dentures 2/21/2015    GERD (gastroesophageal reflux disease)     controlled with med    Hernia of abdominal cavity 6/26/2015    History of atrial flutter 8/2015    ablation    History of complete eye exam 01/01/2015    [place] [facility]    Hypercholesterolemia     Hyperlipidemia 2/18/2015    Hyperlipidemia     Hypertension     controlled with med    Hypoxemia 2/21/2015    MI, old 2/2015    NSTEMI (non-ST elevated myocardial infarction) ([address] 2/14/2015    OA (osteoarthritis) 2/21/2015    Pleural effusion on right 2/21/2015    Postoperative anemia due to acute blood loss 2/19/2015    S/P CABG x 2 2/2015    Thrombocytopenia due to extra corporeal by-pass circulation 2/19/2015    Patient denies    Varicose veins of lower extremities with other complications 1/63/0685    7/24/15 (Dr Gloria Valadez) L GSV RFA ablation 5/15/14 (Dr. Gloria Valadez) R GSV Radiofrequency ablation     Wears dentures      Past Surgical History:   Procedure Laterality Date    CABG, ARTERY-VEIN, TWO  2015    Dr. Yesica Holden  2/2015 and 8/15    CARDIAC CATHETERIZATION  8/2015     [id number]    COLONOSCOPY N/A 10/22/2021    COLONOSCOPY/BMI 30 performed by Catarina Graham MD at [address] [place] [address] 3/9/2018    COLONOSCOPY/ 28 performed by Deanna Casey MD at [facility] ENDOSCOPY    COLONOSCOPY  2012    COLSC FLX W/REMOVAL LESION BY HOT BX FORCEPS  10/22/2021         COLSC FLX W/REMOVAL LESION BY HOT BX FORCEPS  3/9/2018         CORONARY ANGIOPLASTY WITH STENT PLACEMENT  01/29/2021    Two 3.0 x 12 [place] drug-eluting stents    CORONARY ARTERY BYPASS GRAFT  2/15    DR. Pimentel    EGD TRANSORAL BIOPSY SINGLE/MULTIPLE  10/22/2021         FRACTURE SURGERY Right 2007    elbow fx    HERNIA REPAIR  7345    umbilical    HERNIA REPAIR Left unsure of 2nd surgery    LIH and redo embilical, Both Repairs at same time    KNEE ARTHROSCOPY Left 2006    left knee    LEFT HEART CATH,PERCUTANEOUS  01/29/2021    angioplasty and stenting of the two high-grade lesions in the circumflex    ORTHOPEDIC SURGERY Right 2007    right elbow fx    VEIN SURGERY       Family History   Problem Relation Age of Onset    Heart Disease Father     Hypertension Brother     No Known Problems Mother     Heart Disease Brother     Hypertension Brother     Hypertension Sister      Social History     Tobacco Use    Smoking status: Former     Packs/day: 0.25     Years: 20.00     Pack years: 5.00     Types: Cigarettes     Quit date: 2001     Years since quittin.3     Passive exposure: Past    Smokeless tobacco: Never   Substance Use Topics    Alcohol use: No         Review of Systems   Constitutional:  Negative for fatigue and fever. Respiratory:  Negative for shortness of breath. Cardiovascular:  Negative for chest pain. Gastrointestinal:  Negative for nausea and vomiting. OBJECTIVE:  /80 (Site: Left Upper Arm, Position: Sitting, Cuff Size: Large Adult)   Ht 5' 7\" (1.702 m)   Wt 198 lb (89.8 kg)   BMI 31.01 kg/m²      Physical Exam  Vitals and nursing note reviewed. Constitutional:       Appearance: Normal appearance. Eyes:      Pupils: Pupils are equal, round, and reactive to light. Cardiovascular:      Rate and Rhythm: Normal rate and regular rhythm. Pulmonary:      Effort: Pulmonary effort is normal.      Breath sounds: Normal breath sounds. Neurological:      Mental Status: He is alert. Psychiatric:         Mood and Affect: Mood normal.         Behavior: Behavior normal.        Medical problems and test results were reviewed with the patient today.      Recent Results (from the past 672 hour(s))   AMB POC URINALYSIS DIP STICK MANUAL W/ MICRO BSSC    Collection Time: 22 10:00 AM   Result Value Ref Range    Color (UA POC) Yellow     Clarity (UA POC) Clear     Glucose, Urine, POC Negative Negative    Bilirubin, Urine, POC Negative Negative    Ketones, Urine, POC Negative Negative    Specific Gravity, Urine, POC 1.015 1.001 - 1.035    Blood (UA POC) Trace Negative    pH, Urine, POC 6.0 4.6 - 8.0    Protein, Urine, POC Negative Negative    Urobilinogen, POC Normal     Nitrite, Urine, POC Positive Negative    Leukocyte Esterase, Urine, POC 1+ Negative    RBC, Urine, POC 0-3     WBC, Urine, POC 4-6     Epi Cells Urine, POC      Bacteria Urine, POC 1+ Negative    Casts Urine, POC      Yeast, Urine, POC      Trichomonas Urine, POC     Vitamin D 25 Hydroxy    Collection Time: 08/18/22 12:19 PM   Result Value Ref Range    Vit D, 25-Hydroxy 29.4 (L) 30.0 - 100.0 ng/mL   TSH    Collection Time: 08/18/22 12:19 PM   Result Value Ref Range    TSH, 3RD GENERATION 0.796 0.358 - 3.740 uIU/mL   Comprehensive Metabolic Panel    Collection Time: 08/18/22 12:19 PM   Result Value Ref Range    Sodium 134 (L) 136 - 145 mmol/L    Potassium 4.0 3.5 - 5.1 mmol/L    Chloride 104 98 - 107 mmol/L    CO2 30 21 - 32 mmol/L    Anion Gap 0 (L) 7 - 16 mmol/L    Glucose 92 65 - 100 mg/dL    BUN 14 8 - 23 MG/DL    Creatinine 0.70 (L) 0.8 - 1.5 MG/DL    GFR African American >60 >60 ml/min/1.73m2    GFR Non- >60 >60 ml/min/1.73m2    Calcium 8.9 8.3 - 10.4 MG/DL    Total Bilirubin 0.4 0.2 - 1.1 MG/DL    ALT 18 12 - 65 U/L    AST 20 15 - 37 U/L    Alk Phosphatase 74 50 - 136 U/L    Total Protein 7.0 6.3 - 8.2 g/dL    Albumin 3.7 3.2 - 4.6 g/dL    Globulin 3.3 2.3 - 3.5 g/dL    Albumin/Globulin Ratio 1.1 (L) 1.2 - 3.5     PSA Screening    Collection Time: 08/18/22 12:19 PM   Result Value Ref Range    PSA 0.1 <4.0 ng/mL   Lipid Panel    Collection Time: 08/18/22 12:19 PM   Result Value Ref Range    Cholesterol, Total 144 <200 MG/DL    Triglycerides 51 35 - 150 MG/DL    HDL 66 (H) 40 - 60 MG/DL    LDL Calculated 67.8 <100 MG/DL    VLDL Cholesterol Calculated 10.2 6.0 - 23.0 MG/DL    Chol/HDL Ratio 2.2     CBC with Auto Differential    Collection Time: 08/18/22 12:19 PM   Result Value Ref Range    WBC 3.3 (L) 4.3 - 11.1 K/uL    RBC 4.08 (L) 4.23 - 5.6 M/uL Hemoglobin 12.2 (L) 13.6 - 17.2 g/dL    Hematocrit 39.1 (L) 41.1 - 50.3 %    MCV 95.8 79.6 - 97.8 FL    MCH 29.9 26.1 - 32.9 PG    MCHC 31.2 (L) 31.4 - 35.0 g/dL    RDW 13.6 11.9 - 14.6 %    Platelets 532 117 - 605 K/uL    MPV 9.4 9.4 - 12.3 FL    nRBC 0.00 0.0 - 0.2 K/uL    Differential Type AUTOMATED      Seg Neutrophils 55 43 - 78 %    Lymphocytes 22 13 - 44 %    Monocytes 17 (H) 4.0 - 12.0 %    Eosinophils % 4 0.5 - 7.8 %    Basophils 1 0.0 - 2.0 %    Immature Granulocytes 1 0.0 - 5.0 %    Segs Absolute 1.8 1.7 - 8.2 K/UL    Absolute Lymph # 0.7 0.5 - 4.6 K/UL    Absolute Mono # 0.6 0.1 - 1.3 K/UL    Absolute Eos # 0.1 0.0 - 0.8 K/UL    Basophils Absolute 0.0 0.0 - 0.2 K/UL    Absolute Immature Granulocyte 0.0 0.0 - 0.5 K/UL   AMB POC URINALYSIS DIP STICK AUTO W/O MICRO    Collection Time: 08/23/22  5:01 PM   Result Value Ref Range    Color, Urine, POC yellow     Clarity, Urine, POC clear     Glucose, Urine, POC Negative Negative    Bilirubin, Urine, POC Negative Negative    Ketones, Urine, POC Negative Negative    Specific Gravity, Urine, POC 1.015 1.001 - 1.035    Blood, Urine, POC trace Negative    pH, Urine, POC 6.0 4.6 - 8.0    Protein, Urine, POC Negative Negative    Urobilinogen, POC 1.0     Nitrate, Urine, POC positive Negative    Leukocyte Esterase, Urine, POC 1+ Negative   AMB POC URINALYSIS DIP STICK AUTO W/O MICRO    Collection Time: 09/08/22  8:57 AM   Result Value Ref Range    Color, Urine, POC yellow     Clarity, Urine, POC clear     Glucose, Urine, POC Negative Negative    Bilirubin, Urine, POC Negative Negative    Ketones, Urine, POC Negative Negative    Specific Gravity, Urine, POC 1.020 1.001 - 1.035    Blood, Urine, POC trace Negative    pH, Urine, POC 7.5 4.6 - 8.0    Protein, Urine, POC Negative Negative    Urobilinogen, POC normal     Nitrate, Urine, POC POSITIVE Negative    Leukocyte Esterase, Urine, POC Trace Negative       ASSESSMENT and PLAN    Visit Diagnoses and Associated Orders Dysuria    -  Primary    AMB POC URINALYSIS DIP STICK AUTO W/O MICRO [44732 CPT(R)]      Culture, Urine [[id number] Custom]   - Future Order    Culture, Urine [[id number] Custom]           Essential hypertension with goal blood pressure less than 140/90        metoprolol tartrate (LOPRESSOR) 25 MG tablet [[id number]]           Pure hypercholesterolemia        pravastatin (PRAVACHOL) 80 MG tablet [[id number]]                       Diagnosis Orders   1. Dysuria  AMB POC URINALYSIS DIP STICK AUTO W/O MICRO    Culture, Urine    Culture, Urine      2. Essential hypertension with goal blood pressure less than 140/90  metoprolol tartrate (LOPRESSOR) 25 MG tablet      3. Pure hypercholesterolemia  pravastatin (PRAVACHOL) 80 MG tablet      , Thanh Barrientos was seen today for urinary tract infection. Diagnoses and all orders for this visit:    Dysuria  -     AMB POC URINALYSIS DIP STICK AUTO W/O MICRO  -     Culture, Urine; Future  -     Culture, Urine    Essential hypertension with goal blood pressure less than 140/90  -     metoprolol tartrate (LOPRESSOR) 25 MG tablet; Take 0.5 tablets by mouth in the morning and 0.5 tablets in the evening. Pure hypercholesterolemia  -     pravastatin (PRAVACHOL) 80 MG tablet;  Take 1 tablet by mouth daily    , We we will culture his urine and call him back with results and plan

## 2022-09-11 LAB
BACTERIA SPEC CULT: ABNORMAL
BACTERIA SPEC CULT: ABNORMAL
SERVICE CMNT-IMP: ABNORMAL

## 2022-09-13 ENCOUNTER — TELEPHONE (OUTPATIENT)
Dept: FAMILY MEDICINE CLINIC | Facility: CLINIC | Age: 77
End: 2022-09-13

## 2022-09-13 DIAGNOSIS — N39.0 URINARY TRACT INFECTION WITHOUT COMPLICATION: Primary | ICD-10-CM

## 2022-09-13 RX ORDER — AMOXICILLIN AND CLAVULANATE POTASSIUM 875; 125 MG/1; MG/1
1 TABLET, FILM COATED ORAL 2 TIMES DAILY
Qty: 14 TABLET | Refills: 0 | Status: SHIPPED | OUTPATIENT
Start: 2022-09-13 | End: 2022-09-20

## 2022-09-22 ENCOUNTER — NURSE ONLY (OUTPATIENT)
Dept: FAMILY MEDICINE CLINIC | Facility: CLINIC | Age: 77
End: 2022-09-22
Payer: COMMERCIAL

## 2022-09-22 ENCOUNTER — OFFICE VISIT (OUTPATIENT)
Dept: FAMILY MEDICINE CLINIC | Facility: CLINIC | Age: 77
End: 2022-09-22
Payer: MEDICARE

## 2022-09-22 VITALS
SYSTOLIC BLOOD PRESSURE: 130 MMHG | WEIGHT: 197 LBS | DIASTOLIC BLOOD PRESSURE: 80 MMHG | BODY MASS INDEX: 30.92 KG/M2 | HEIGHT: 67 IN

## 2022-09-22 DIAGNOSIS — N39.0 URINARY TRACT INFECTION WITHOUT COMPLICATION: Primary | ICD-10-CM

## 2022-09-22 LAB
BACTERIA URINE, POC: ABNORMAL
BILIRUBIN, URINE, POC: NEGATIVE
BLOOD URINE, POC: ABNORMAL
CASTS URINE, POC: ABNORMAL
EPI CELLS URINE, POC: ABNORMAL
GLUCOSE URINE, POC: NEGATIVE
KETONES, URINE, POC: NEGATIVE
LEUKOCYTE ESTERASE, URINE, POC: NEGATIVE
NITRITE, URINE, POC: NEGATIVE
PH, URINE, POC: 5.5 (ref 4.6–8)
PROTEIN,URINE, POC: NEGATIVE
RBC, URINE, POC: ABNORMAL
SPECIFIC GRAVITY, URINE, POC: 1.01 (ref 1–1.03)
TRICHOMONAS URINE, POC: ABNORMAL
URINALYSIS CLARITY, POC: CLEAR
URINALYSIS COLOR, POC: YELLOW
UROBILINOGEN, POC: NORMAL
WBC, URINE, POC: ABNORMAL
YEAST, URINE, POC: ABNORMAL

## 2022-09-22 PROCEDURE — 1123F ACP DISCUSS/DSCN MKR DOCD: CPT | Performed by: FAMILY MEDICINE

## 2022-09-22 PROCEDURE — 81000 URINALYSIS NONAUTO W/SCOPE: CPT | Performed by: FAMILY MEDICINE

## 2022-09-22 PROCEDURE — 99213 OFFICE O/P EST LOW 20 MIN: CPT | Performed by: FAMILY MEDICINE

## 2022-09-22 ASSESSMENT — PATIENT HEALTH QUESTIONNAIRE - PHQ9
1. LITTLE INTEREST OR PLEASURE IN DOING THINGS: 0
SUM OF ALL RESPONSES TO PHQ QUESTIONS 1-9: 0
2. FEELING DOWN, DEPRESSED OR HOPELESS: 0
SUM OF ALL RESPONSES TO PHQ QUESTIONS 1-9: 0
SUM OF ALL RESPONSES TO PHQ QUESTIONS 1-9: 0
SUM OF ALL RESPONSES TO PHQ9 QUESTIONS 1 & 2: 0
SUM OF ALL RESPONSES TO PHQ QUESTIONS 1-9: 0

## 2022-09-22 ASSESSMENT — ENCOUNTER SYMPTOMS
NAUSEA: 0
SHORTNESS OF BREATH: 0
VOMITING: 0

## 2022-09-22 NOTE — PROGRESS NOTES
PROGRESS NOTE    SUBJECTIVE:   Hafsa Garner Sr. is a 69 y.o. male seen for a follow up visit regarding the following chief complaint:     Chief Complaint   Patient presents with    Urinary Tract Infection     Follow up           HPI patient presents office follow-up of a recent urinary tract infection without complaints states he is feeling better      Past Medical History, Past Surgical History, Family history, Social History, and Medications were all reviewed with the patient today and updated as necessary. Current Outpatient Medications   Medication Sig Dispense Refill    metoprolol tartrate (LOPRESSOR) 25 MG tablet Take 0.5 tablets by mouth in the morning and 0.5 tablets in the evening.  180 tablet 3    pravastatin (PRAVACHOL) 80 MG tablet Take 1 tablet by mouth daily 90 tablet 3    clopidogrel (PLAVIX) 75 MG tablet Take 1 tablet by mouth daily 90 tablet 3    doxazosin (CARDURA) 8 MG tablet Take 1 tablet by mouth 2 times daily 180 tablet 3    ferrous sulfate (IRON 325) 325 (65 Fe) MG tablet Take 1 tablet by mouth every morning (before breakfast) 90 tablet 3    lisinopril (PRINIVIL;ZESTRIL) 20 MG tablet Take 1 tablet by mouth daily Take 1 tablet by mouth twice a day for hypertension 90 tablet 3    omeprazole (PRILOSEC) 20 MG delayed release capsule Take 1 capsule by mouth Daily TAKE 1 CAPSULE TWO TIMES A DAY FOR GASTROESOPHAGEAL REFLUX 90 capsule 3    potassium gluconate 550 mg tablet Take 1 tab  tablet 3    fish oil-omega-3 fatty acids 1000 MG capsule Take 2 tablets by mouth      zinc 50 MG CAPS Take by mouth daily      Coenzyme Q10 (COQ10) 100 MG CAPS Take by mouth daily      Calcium Carbonate-Vit D-Min (CALCIUM 600+D3 PLUS MINERALS) 600-800 MG-UNIT CHEW Take 1 tablet by mouth daily      Multiple Vitamins-Minerals (ONE-A-DAY MENS 50+ ADVANTAGE PO) Take 1 tablet by mouth daily      SELENIUM PO Take by mouth daily      albuterol sulfate  (90 Base) MCG/ACT inhaler Inhale 2 puffs into the lungs every 6 hours as needed      ascorbic acid (VITAMIN C) 500 MG tablet Take by mouth daily      aspirin 81 MG EC tablet Take 81 mg by mouth daily      nitroGLYCERIN (NITROSTAT) 0.4 MG SL tablet Place 0.4 mg under the tongue      tamsulosin (FLOMAX) 0.4 MG capsule Take 0.4 mg by mouth 2 times daily       No current facility-administered medications for this visit. No Known Allergies  Patient Active Problem List   Diagnosis    Arthritis    Gastroesophageal reflux disease with esophagitis    Typical atrial flutter (HCC)    CAD in native artery    Acquired elevated diaphragm    Elevated PSA    CAD (coronary artery disease)    Hx of CABG    Pure hypercholesterolemia    Prostate cancer ([address]    Essential hypertension with goal blood pressure less than 140/90    Aortic valve sclerosis    Chronic atrial fibrillation, unspecified ([address]     Past Medical History:   Diagnosis Date    Arrhythmia 8/2015 at [facility]    A-Flutter ablation--- dr Roseanne Segundo    Aspirin long-term use     CAD (coronary artery disease) 01/29/2021    PCI    CAD (coronary artery disease)     mi--2/2015 cabg 2/2015---- No stents    CAD in native artery 2/19/2015    2/19/15 (Dr She Cisneros) Coronary artery bypass grafting x2 with grafts consisting of bilateral mammaries     Cancer ([facility] [address]     prostate    Elevated hemidiaphragm 2/21/2015 2/20 barium swallow on 2/12/15 that revealed an unremarkable esophagus and paralyzed left diaphragm with an unusual rotation of the stomach in the left upper quadrant  esophagram 2/12/2015 which revealed an abnormal contour the left hemidiaphragm, suggesting diaphragmatic hernia. 2/20 Barium Swallow Barium swallow today to assess for any signs of possible torsion.  If no torsion present, will likel    Elevated hemoglobin A1c 2/20/2015    6.1 - 2/17/15     Full dentures 2/21/2015    GERD (gastroesophageal reflux disease)     controlled with med    Hernia of abdominal cavity 6/26/2015    History of atrial flutter 8/2015 ablation    History of complete eye exam 01/01/2015    [facility] [facility]    Hypercholesterolemia     Hyperlipidemia 2/18/2015    Hyperlipidemia     Hypertension     controlled with med    Hypoxemia 2/21/2015    MI, old 2/2015    NSTEMI (non-ST elevated myocardial infarction) ([facility] [address] 2/14/2015    OA (osteoarthritis) 2/21/2015    Pleural effusion on right 2/21/2015    Postoperative anemia due to acute blood loss 2/19/2015    S/P CABG x 2 2/2015    Thrombocytopenia due to extra corporeal by-pass circulation 2/19/2015    Patient denies    Varicose veins of lower extremities with other complications 9/10/1445    7/24/15 (Dr Cristina Umaña) L GSV RFA ablation 5/15/14 (Dr. Cristina Umaña) R GSV Radiofrequency ablation     Wears dentures      Past Surgical History:   Procedure Laterality Date    CABG, ARTERY-VEIN, TWO  2015    Dr. Lorena Barker  2/2015 and 8/15    CARDIAC CATHETERIZATION  8/2015     [id number]    COLONOSCOPY N/A 10/22/2021    COLONOSCOPY/BMI 30 performed by Alma Rosa Zafar MD at [address] [place] [address] 3/9/2018    COLONOSCOPY/ 28 performed by Kenton Bowden MD at [facility] ENDOSCOPY    COLONOSCOPY  2012    COLSC FLX W/REMOVAL LESION BY HOT BX FORCEPS  10/22/2021         COLSC FLX W/REMOVAL LESION BY HOT BX FORCEPS  3/9/2018         CORONARY ANGIOPLASTY WITH STENT PLACEMENT  01/29/2021    Two 3.0 x 12 [place] drug-eluting stents    CORONARY ARTERY BYPASS GRAFT  2/15    DR. Pimentel    EGD TRANSORAL BIOPSY SINGLE/MULTIPLE  10/22/2021         FRACTURE SURGERY Right 2007    elbow fx    HERNIA REPAIR  0107    umbilical    HERNIA REPAIR Left unsure of 2nd surgery    LIH and redo embilical, Both Repairs at same time    KNEE ARTHROSCOPY Left 2006    left knee    LEFT HEART CATH,PERCUTANEOUS  01/29/2021    angioplasty and stenting of the two high-grade lesions in the circumflex    ORTHOPEDIC SURGERY Right 2007    right elbow fx    VEIN SURGERY       Family History   Problem Relation Age of Onset Heart Disease Father     Hypertension Brother     No Known Problems Mother     Heart Disease Brother     Hypertension Brother     Hypertension Sister      Social History     Tobacco Use    Smoking status: Former     Packs/day: 0.25     Years: 20.00     Pack years: 5.00     Types: Cigarettes     Quit date: 2001     Years since quittin.4     Passive exposure: Past    Smokeless tobacco: Never   Substance Use Topics    Alcohol use: No         Review of Systems   Constitutional:  Negative for fatigue and fever. Respiratory:  Negative for shortness of breath. Cardiovascular:  Negative for chest pain. Gastrointestinal:  Negative for nausea and vomiting. OBJECTIVE:  /80 (Site: Left Lower Arm, Position: Sitting, Cuff Size: Large Adult)   Ht 5' 7\" (1.702 m)   Wt 197 lb (89.4 kg)   BMI 30.85 kg/m²      Physical Exam  Vitals and nursing note reviewed. Constitutional:       Appearance: Normal appearance. Eyes:      Pupils: Pupils are equal, round, and reactive to light. Cardiovascular:      Rate and Rhythm: Normal rate and regular rhythm. Pulmonary:      Effort: Pulmonary effort is normal.      Breath sounds: Normal breath sounds. Neurological:      Mental Status: He is alert. Psychiatric:         Mood and Affect: Mood normal.         Behavior: Behavior normal.        Medical problems and test results were reviewed with the patient today.      Recent Results (from the past 672 hour(s))   AMB POC URINALYSIS DIP STICK AUTO W/O MICRO    Collection Time: 22  8:57 AM   Result Value Ref Range    Color, Urine, POC yellow     Clarity, Urine, POC clear     Glucose, Urine, POC Negative Negative    Bilirubin, Urine, POC Negative Negative    Ketones, Urine, POC Negative Negative    Specific Gravity, Urine, POC 1.020 1.001 - 1.035    Blood, Urine, POC trace Negative    pH, Urine, POC 7.5 4.6 - 8.0    Protein, Urine, POC Negative Negative    Urobilinogen, POC normal     Nitrate, Urine, POC infection without complication  , Repeat UA came back clear except for +1 blood in his urine left a message with Dr. Etienne Hurtado apparently patient's had issues with hematuria no kidney stones on previous CT we will recheck a UA when he sees Dr. Etienne Hurtado and he is aware supportive care otherwise follow-up as needed

## 2022-10-03 ENCOUNTER — NURSE ONLY (OUTPATIENT)
Dept: UROLOGY | Age: 77
End: 2022-10-03

## 2022-10-03 DIAGNOSIS — C61 MALIGNANT NEOPLASM OF PROSTATE (HCC): ICD-10-CM

## 2022-10-03 DIAGNOSIS — C61 MALIGNANT NEOPLASM OF PROSTATE (HCC): Primary | ICD-10-CM

## 2022-10-04 LAB — PSA SERPL-MCNC: 0.1 NG/ML

## 2022-10-06 ENCOUNTER — OFFICE VISIT (OUTPATIENT)
Dept: UROLOGY | Age: 77
End: 2022-10-06
Payer: COMMERCIAL

## 2022-10-06 DIAGNOSIS — N40.1 BENIGN PROSTATIC HYPERPLASIA WITH URINARY OBSTRUCTION: ICD-10-CM

## 2022-10-06 DIAGNOSIS — C61 MALIGNANT NEOPLASM OF PROSTATE (HCC): Primary | ICD-10-CM

## 2022-10-06 DIAGNOSIS — N13.8 BENIGN PROSTATIC HYPERPLASIA WITH URINARY OBSTRUCTION: ICD-10-CM

## 2022-10-06 LAB
BILIRUBIN, URINE, POC: NEGATIVE
BLOOD URINE, POC: NEGATIVE
GLUCOSE URINE, POC: NEGATIVE
KETONES, URINE, POC: NEGATIVE
LEUKOCYTE ESTERASE, URINE, POC: NEGATIVE
NITRITE, URINE, POC: NEGATIVE
PH, URINE, POC: 6.5 (ref 4.6–8)
PROTEIN,URINE, POC: NEGATIVE
SPECIFIC GRAVITY, URINE, POC: 1.03 (ref 1–1.03)
URINALYSIS CLARITY, POC: NORMAL
URINALYSIS COLOR, POC: NORMAL
UROBILINOGEN, POC: NORMAL

## 2022-10-06 PROCEDURE — 99214 OFFICE O/P EST MOD 30 MIN: CPT | Performed by: UROLOGY

## 2022-10-06 PROCEDURE — 81003 URINALYSIS AUTO W/O SCOPE: CPT | Performed by: UROLOGY

## 2022-10-06 PROCEDURE — 1123F ACP DISCUSS/DSCN MKR DOCD: CPT | Performed by: UROLOGY

## 2022-10-06 RX ORDER — TAMSULOSIN HYDROCHLORIDE 0.4 MG/1
0.4 CAPSULE ORAL EVERY EVENING
Qty: 90 CAPSULE | Refills: 3 | Status: SHIPPED | OUTPATIENT
Start: 2022-10-06

## 2022-10-06 ASSESSMENT — ENCOUNTER SYMPTOMS: NAUSEA: 0

## 2022-10-06 NOTE — PROGRESS NOTES
[facility]rology  [address] [facility]    Leander Mcpherson [address], [address] [facility]  [phone]          Lazarus Cavanaugh Sr.  : 1945    Chief Complaint   Patient presents with    Follow-up          HPI     Lazarus Cavanaugh Sr. is a 69 y.o. male followed in regards to prostate cancer. He was referred by Dr. Madeline Butterfield in regards to need for TRUS guided prostate biopsy in an operative setting. Patient has a + family history of ACP with his brother being diagnosed in his mid 63's. Patient has mild to moderate LUTS. He has CAD s/p CABG and is on 81 mg aspirin. He has undergone 1 previous prostate biopsy by Dr. Luc Tao and had a BAD experience with a lot of pain during the procedure. TRUS guided prostate biopsy in the OR 19 revealed Nany 6,7, and 8 prostate cancer. Gland was 53 grams. He has undergone previous inguinal and umbilical hernia repairs with mesh. He finished EBRT with Dr. Bartolo Hale in . He finished 4 45 mg lupron injections. First 45 mg lupron injection was given 20. Last was given 21. He is on tamsulosin. Despite the fact he has been on cardura for years from PCP, adding tamsulosin did improve LUTS.           PSA: 10/15 6.0,  5.5, 10/16 7.8,  9.3,  11.38,  16.2,  21.3,  21.5, 21 <0.1,  <0.006, 3/22 <0.1, 10/22 0.1          Past Medical History:   Diagnosis Date    Arrhythmia 2015 at [facility]    A-Flutter ablation--- dr Morrissey Stage    Aspirin long-term use     CAD (coronary artery disease) 2021    PCI    CAD (coronary artery disease)     mi--2015 cabg 2015---- No stents    CAD in native artery 2015    2/19/15 (Dr Lori Arizmendi) Coronary artery bypass grafting x2 with grafts consisting of bilateral mammaries     Cancer ([facility] [address]     prostate    Elevated hemidiaphragm 2015 barium swallow on 2/12/15 that revealed an unremarkable esophagus and paralyzed left diaphragm with an unusual rotation of the stomach in the left upper quadrant  esophagram 2/12/2015 which revealed an abnormal contour the left hemidiaphragm, suggesting diaphragmatic hernia. 2/20 Barium Swallow Barium swallow today to assess for any signs of possible torsion. If no torsion present, will likel    Elevated hemoglobin A1c 2/20/2015    6.1 - 2/17/15     Full dentures 2/21/2015    GERD (gastroesophageal reflux disease)     controlled with med    Hernia of abdominal cavity 6/26/2015    History of atrial flutter 8/2015    ablation    History of complete eye exam 01/01/2015    [facility] [facility]    Hypercholesterolemia     Hyperlipidemia 2/18/2015    Hyperlipidemia     Hypertension     controlled with med    Hypoxemia 2/21/2015    MI, old 2/2015    NSTEMI (non-ST elevated myocardial infarction) ([facility] [address] 2/14/2015    OA (osteoarthritis) 2/21/2015    Pleural effusion on right 2/21/2015    Postoperative anemia due to acute blood loss 2/19/2015    S/P CABG x 2 2/2015    Thrombocytopenia due to extra corporeal by-pass circulation 2/19/2015    Patient denies    Varicose veins of lower extremities with other complications 3/00/1132    7/24/15 (Dr Manuel Ross) L GSV RFA ablation 5/15/14 (Dr. Manuel Ross) R GSV Radiofrequency ablation     Wears dentures      Past Surgical History:   Procedure Laterality Date    CABG, ARTERY-VEIN, TWO  2015    Dr. Maria D Shore  2/2015 and 8/15    CARDIAC CATHETERIZATION  8/2015     [id number]    COLONOSCOPY N/A 10/22/2021    COLONOSCOPY/BMI 30 performed by Deep Redding MD at [facility] N/A 3/9/2018    COLONOSCOPY/ 28 performed by Berry Oakes MD at [facility] ENDOSCOPY    COLONOSCOPY  2012    COLSC FLX W/REMOVAL LESION BY HOT BX FORCEPS  10/22/2021         COLSC FLX W/REMOVAL LESION BY HOT BX FORCEPS  3/9/2018         CORONARY ANGIOPLASTY WITH STENT PLACEMENT  01/29/2021    Two 3.0 x 12 [place] drug-eluting stents    CORONARY ARTERY BYPASS GRAFT  2/15    DR. Pimentel    EGD TRANSORAL BIOPSY SINGLE/MULTIPLE  10/22/2021         FRACTURE SURGERY Right 2007    elbow fx    HERNIA REPAIR  8624    umbilical    HERNIA REPAIR Left unsure of 2nd surgery    LIH and redo embilical, Both Repairs at same time    KNEE ARTHROSCOPY Left 2006    left knee    LEFT HEART CATH,PERCUTANEOUS  01/29/2021    angioplasty and stenting of the two high-grade lesions in the circumflex    ORTHOPEDIC SURGERY Right 2007    right elbow fx    VEIN SURGERY       Current Outpatient Medications   Medication Sig Dispense Refill    tamsulosin (FLOMAX) 0.4 MG capsule Take 1 capsule by mouth every evening 90 capsule 3    metoprolol tartrate (LOPRESSOR) 25 MG tablet Take 0.5 tablets by mouth in the morning and 0.5 tablets in the evening.  180 tablet 3    pravastatin (PRAVACHOL) 80 MG tablet Take 1 tablet by mouth daily 90 tablet 3    clopidogrel (PLAVIX) 75 MG tablet Take 1 tablet by mouth daily 90 tablet 3    doxazosin (CARDURA) 8 MG tablet Take 1 tablet by mouth 2 times daily 180 tablet 3    ferrous sulfate (IRON 325) 325 (65 Fe) MG tablet Take 1 tablet by mouth every morning (before breakfast) 90 tablet 3    lisinopril (PRINIVIL;ZESTRIL) 20 MG tablet Take 1 tablet by mouth daily Take 1 tablet by mouth twice a day for hypertension 90 tablet 3    omeprazole (PRILOSEC) 20 MG delayed release capsule Take 1 capsule by mouth Daily TAKE 1 CAPSULE TWO TIMES A DAY FOR GASTROESOPHAGEAL REFLUX 90 capsule 3    potassium gluconate 550 mg tablet Take 1 tab  tablet 3    fish oil-omega-3 fatty acids 1000 MG capsule Take 2 tablets by mouth      zinc 50 MG CAPS Take by mouth daily      Coenzyme Q10 (COQ10) 100 MG CAPS Take by mouth daily      Calcium Carbonate-Vit D-Min (CALCIUM 600+D3 PLUS MINERALS) 600-800 MG-UNIT CHEW Take 1 tablet by mouth daily      Multiple Vitamins-Minerals (ONE-A-DAY MENS 50+ ADVANTAGE PO) Take 1 tablet by mouth daily      SELENIUM PO Take by mouth daily      albuterol sulfate  (90 Base) MCG/ACT inhaler Inhale 2 puffs into the lungs every 6 hours as needed      ascorbic acid (VITAMIN C) 500 MG tablet Take by mouth daily      aspirin 81 MG EC tablet Take 81 mg by mouth daily      nitroGLYCERIN (NITROSTAT) 0.4 MG SL tablet Place 0.4 mg under the tongue       No current facility-administered medications for this visit. No Known Allergies  Social History     Socioeconomic History    Marital status: Single     Spouse name: Not on file    Number of children: Not on file    Years of education: Not on file    Highest education level: Not on file   Occupational History    Not on file   Tobacco Use    Smoking status: Former     Packs/day: 0.25     Years: 20.00     Pack years: 5.00     Types: Cigarettes     Quit date: 2001     Years since quittin.4     Passive exposure: Past    Smokeless tobacco: Never   Vaping Use    Vaping Use: Never used   Substance and Sexual Activity    Alcohol use: No    Drug use: No    Sexual activity: Not on file   Other Topics Concern    Not on file   Social History Narrative    Not on file     Social Determinants of Health     Financial Resource Strain: Not on file   Food Insecurity: Not on file   Transportation Needs: Not on file   Physical Activity: Not on file   Stress: Not on file   Social Connections: Not on file   Intimate Partner Violence: Not on file   Housing Stability: Not on file     Family History   Problem Relation Age of Onset    Heart Disease Father     Hypertension Brother     No Known Problems Mother     Heart Disease Brother     Hypertension Brother     Hypertension Sister        Review of Systems  Constitutional:   Negative for fever. GI:  Negative for nausea. Genitourinary:  Negative for flank pain.     Urinalysis  UA - Dipstick  Results for orders placed or performed in visit on 10/06/22   AMB POC URINALYSIS DIP STICK AUTO W/O MICRO   Result Value Ref Range    Color (UA POC)      Clarity (UA POC)      Glucose, Urine, POC Negative Negative    Bilirubin, Urine, POC Negative Negative    KETONES, Urine, POC Negative Negative    Specific Gravity, Urine, POC 1.030 1.001 - 1.035    Blood (UA POC) Negative Negative    pH, Urine, POC 6.5 4.6 - 8.0    Protein, Urine, POC Negative Negative    Urobilinogen, POC 1 mg/dL     Nitrite, Urine, POC Negative Negative    Leukocyte Esterase, Urine, POC Negative Negative   Results for orders placed or performed in visit on 09/08/22   AMB POC URINALYSIS DIP STICK AUTO W/O MICRO   Result Value Ref Range    Color, Urine, POC yellow     Clarity, Urine, POC clear     Glucose, Urine, POC Negative Negative    Bilirubin, Urine, POC Negative Negative    Ketones, Urine, POC Negative Negative    Specific Gravity, Urine, POC 1.020 1.001 - 1.035    Blood, Urine, POC trace Negative    pH, Urine, POC 7.5 4.6 - 8.0    Protein, Urine, POC Negative Negative    Urobilinogen, POC normal     Nitrate, Urine, POC POSITIVE Negative    Leukocyte Esterase, Urine, POC Trace Negative       There were no vitals taken for this visit. GENERAL: NAD, ALERT, A&O x 3, GAIT NORMAL  LUNGS: easy work of breathing  ABDOMEN: soft, non tender  NEUROLOGIC: cranial nerves 2-12 grossly intact           Assessment and Plan    ICD-10-CM    1. Malignant neoplasm of prostate (HCC)  C61 AMB POC URINALYSIS DIP STICK AUTO W/O MICRO     PSA, Diagnostic      2. Benign prostatic hyperplasia with urinary obstruction  N40.1 AMB POC URINALYSIS DIP STICK AUTO W/O MICRO    N13.8 tamsulosin (FLOMAX) 0.4 MG capsule        In regards to prostate cancer, psa is low. It will be repeated in 1 year. In regards to bph/luts, tamsulosin was refilled. He recently took cipro (didn't help) and then augmentin (did help) from Dr. Jon Taylor in regards to ? Of UTI or prostatitis. Pt. will follow up in 12 months with psa.

## 2022-11-20 DIAGNOSIS — E78.00 PURE HYPERCHOLESTEROLEMIA: ICD-10-CM

## 2022-11-20 DIAGNOSIS — I10 ESSENTIAL HYPERTENSION WITH GOAL BLOOD PRESSURE LESS THAN 140/90: ICD-10-CM

## 2022-11-20 DIAGNOSIS — K21.9 GASTRO-ESOPHAGEAL REFLUX DISEASE WITHOUT ESOPHAGITIS: ICD-10-CM

## 2022-11-22 RX ORDER — PRAVASTATIN SODIUM 80 MG/1
TABLET ORAL
Qty: 90 TABLET | Refills: 2 | OUTPATIENT
Start: 2022-11-22

## 2022-11-22 RX ORDER — OMEPRAZOLE 20 MG/1
CAPSULE, DELAYED RELEASE ORAL
Qty: 180 CAPSULE | Refills: 2 | OUTPATIENT
Start: 2022-11-22

## 2022-11-22 RX ORDER — LISINOPRIL 20 MG/1
TABLET ORAL
Qty: 180 TABLET | Refills: 2 | OUTPATIENT
Start: 2022-11-22

## 2023-01-12 ENCOUNTER — OFFICE VISIT (OUTPATIENT)
Dept: PULMONOLOGY | Age: 78
End: 2023-01-12
Payer: MEDICARE

## 2023-01-12 VITALS
SYSTOLIC BLOOD PRESSURE: 130 MMHG | HEART RATE: 99 BPM | BODY MASS INDEX: 32.38 KG/M2 | OXYGEN SATURATION: 93 % | TEMPERATURE: 97.2 F | WEIGHT: 206.3 LBS | HEIGHT: 67 IN | DIASTOLIC BLOOD PRESSURE: 70 MMHG

## 2023-01-12 DIAGNOSIS — J98.6 ELEVATED HEMIDIAPHRAGM: ICD-10-CM

## 2023-01-12 DIAGNOSIS — J44.9 CHRONIC OBSTRUCTIVE PULMONARY DISEASE, UNSPECIFIED COPD TYPE (HCC): Primary | ICD-10-CM

## 2023-01-12 DIAGNOSIS — R06.09 DYSPNEA ON EXERTION: ICD-10-CM

## 2023-01-12 DIAGNOSIS — Z87.891 FORMER TOBACCO USE: ICD-10-CM

## 2023-01-12 PROCEDURE — 1036F TOBACCO NON-USER: CPT | Performed by: NURSE PRACTITIONER

## 2023-01-12 PROCEDURE — 1123F ACP DISCUSS/DSCN MKR DOCD: CPT | Performed by: NURSE PRACTITIONER

## 2023-01-12 PROCEDURE — G8484 FLU IMMUNIZE NO ADMIN: HCPCS | Performed by: NURSE PRACTITIONER

## 2023-01-12 PROCEDURE — 3023F SPIROM DOC REV: CPT | Performed by: NURSE PRACTITIONER

## 2023-01-12 PROCEDURE — 3078F DIAST BP <80 MM HG: CPT | Performed by: NURSE PRACTITIONER

## 2023-01-12 PROCEDURE — G8417 CALC BMI ABV UP PARAM F/U: HCPCS | Performed by: NURSE PRACTITIONER

## 2023-01-12 PROCEDURE — G8427 DOCREV CUR MEDS BY ELIG CLIN: HCPCS | Performed by: NURSE PRACTITIONER

## 2023-01-12 PROCEDURE — 99215 OFFICE O/P EST HI 40 MIN: CPT | Performed by: NURSE PRACTITIONER

## 2023-01-12 PROCEDURE — 3075F SYST BP GE 130 - 139MM HG: CPT | Performed by: NURSE PRACTITIONER

## 2023-01-12 NOTE — PROGRESS NOTES
Name:  Naima Richardson Sr. YOB: 1945   MRN: [id number]      Office Visit: 1/12/2023        ASSESSMENT AND PLAN:  (Medical Decision Making)    Impression: 60-year-old male with mild remote smoking history, increased shortness of breath and COPD. 1. Dyspnea on exertion  --Seems to be with any significant exertion. Denies wheezing or cough with the shortness of breath  --Spirometry did show obstruction, but review of CT shows a significantly elevated left hemidiaphragm which seems to be getting worse from imaging from 2018. He might benefit from pulmonary rehab    2. Chronic obstructive pulmonary disease, unspecified COPD type ([facility] [address]  --Patient trial Breztri earlier last year and felt he noticed some improvement with his breathing, however he had an issue with the pharmacy and cost of the medication. --Like to try the Com[place] again. Patient was given a list of inhalers to discuss with his insurance which would be better covered. He is to let me know, but for now has Breztri samples from the office    3. Elevated hemidiaphragm  --Noted since at least 2018. Is higher on the CT scan from 2021. Initially it looks like a diaphragmatic hernia, but upon closer elevation it is an elevated diaphragm. Had discussed surgical options with him, but since its not diaphragmatic, may need to just monitor    4. Former tobacco use  --Patient with a very minimal smoking history. No orders of the defined types were placed in this encounter. No orders of the defined types were placed in this encounter. Follow-up and Dispositions    Return in about 3 months (around 4/12/2023) for Abida Kate. MISTI Calix - CNP    Collaborating physician is Vivian Brock MD    Total time for encounter on day of encounter was 44 minutes. This time includes chart prep, review of tests/procedures, review of other provider's notes, documentation and counseling patient regarding disease process and medications. _________________________________________________________________________    HISTORY OF PRESENT ILLNESS:    Mr. Ronald Ferro is a 69 y.o. male who is seen at [facility] today for  No chief complaint on file. Mr. Ronald Ferro is a very pleasant 69year old male, PMH HTN, CAD, CABG in 2015, GERD, and prostate cancer, here today as a follow up for COPD. The patient had CPFTs done on 3/14/22 which revealed severe obstructive disease with FEV1 38%. He was initiated on Breztri, but unfortunately had some issues with the pharmacy cost of the inhaler. CT chest with contrast in March 2021 showed significant elevation of the left hemidiaphragm. The patient does have a history of receiving varicose vein treatment (no stripping) and is being treated for prostate cancer (Lupron). The patient is a former 0.25 ppd X 40 years smoker, quit in 2001. REVIEW OF SYSTEMS: 10 point review of systems is negative except as reported in HPI. PHYSICAL EXAM: Body mass index is 32.31 kg/m². Vitals:    01/12/23 1014   BP: 130/70   Pulse: 99   Temp: 97.2 °F (36.2 °C)   SpO2: 93%   Weight: 206 lb 4.8 oz (93.6 kg)   Height: 5' 7\" (1.702 m)         General:   Alert, cooperative, no distress, appears stated age. Eyes:   Conjunctivae/corneas clear. PERRL        Mouth/Throat:  Lips, mucosa, and tongue normal. Teeth and gums normal.        Lungs:   Left lung sounds very diminished in the bases, otherwise were clear on room air, saturation was 93%     Heart:   Regular rate and rhythm, S1, S2 normal, no murmur, click, rub or gallop. Abdomen:    Soft, non-tender. Extremities:  Extremities normal, atraumatic, no cyanosis or edema.      Skin:  Skin color normal. No rashes or lesions     Neurologic:  A&Ox3     DIAGNOSTIC TESTS:                                                                                    LABS:   Lab Results   Component Value Date/Time    WBC 3.3 08/18/2022 12:19 PM    HGB 12.2 08/18/2022 12:19 PM HCT 39.1 08/18/2022 12:19 PM     08/18/2022 12:19 PM    TSH 1.180 08/04/2021 11:04 AM     Imaging: I performed an independent interpretation of the patient's images. CXR:     XR CHEST STANDARD TWO VW 03/11/2021    Narrative  CHEST X-RAY, 2 views. HISTORY:  Shortness breath. TECHNIQUE: PA and lateral views. COMPARISON: December 2018. FINDINGS:  Lungs: Left hemidiaphragm is elevated to the level of the nasir. Minimal  basilar atelectasis. .  Costophrenic angles: Obscured on the left and sharp on the right. Heart size: Probably normal.  Pulmonary vasculature: is unremarkable. Aorta: Normal caliber. Included portion of the upper abdomen: is unremarkable. Bones: Sternal wires  Other: None. Impression  Persistent elevation left hemidiaphragm. No acute lung findings. Minimal subsegmental atelectasis at the bases. CT Chest:     CT ABDOMEN PELVIS W WO IV CONTRAST 03/19/2021    Narrative  CT of the Abdomen and Pelvis    INDICATION:  Right renal mass, history of prostate cancer    Multiple axial images were obtained through the abdomen and pelvis before and  after intravenous injection of 100mL of Isovue 370. Radiation dose reduction  techniques were used for this study: All CT scans performed at this facility  use one or all of the following: Automated exposure control, adjustment of the  mA and/or kVp according to patient's size, iterative reconstruction. COMPARISON: Chest CT dated 03/11/2021    FINDINGS:  - KIDNEYS/URETERS: 12 mm hyperdense lesion in the upper pole the right kidney. Measures 56 Hounsfield units precontrast and 59 Hounsfield units postcontrast.  The lack of enhancement suggests a hemorrhagic cyst.  Multiple other simple  cysts are seen in both kidneys. There is no enhancing renal mass. - BLADDER: Mild diffuse bladder wall thickening.    - LUNG BASES: Elevated left diaphragm. Lung bases are clear.  - LIVER: Normal in size and appearance.   - GALLBLADDER/BILE DUCTS: No gallstones or bile duct dilatation.  - PANCREAS: Normal.  - ADRENALS: There are bilateral renal masses. There is a 16 mm mass in the left  adrenal gland. There are 2 separate masses in the right adrenal gland, 2.2 and  1.6 cm respectively in size. Masses are stable compared with MRI dated  09/21/2019, likely adenomas. - SPLEEN: Normal.    - REPRODUCTIVE ORGANS: No discrete prostate mass.  - BOWEL: There is a right periumbilical hernia with colon involvement, no  definite obstruction. No significant bowel distention.  - LYMPH NODES: No significant retroperitoneal, mesenteric, or pelvic adenopathy.  - BONES: Normal  - VASCULATURE: Moderate atherosclerosis.  - OTHER: No ascites. Impression  1. Hemorrhagic cyst in the upper pole of the right kidney. No enhancing renal  masses. 2.  Stable bilateral adrenal masses, likely adenomas. 3.  Right periumbilical ventral hernia with colon involvement, no definite  obstruction. ** If there are any questions about this report, I can be reached on  SendmailServe or at [phone] **    Nuclear Medicine:   [place] 01/09/2020    Narrative  WHOLE BODY BONE SCAN    CLINICAL INDICATION: MRI pelvis 1/9/2020    COMPARISON: There are no previous relevant studies available for correlation. TECHNIQUE:  Anterior and posterior images of the whole body were obtained  approximately 3 hours after injection of mCi of Technetium-99m-HDP  intravenously. Bilateral anterior and posterior oblique images of the torso and  pelvis as well as bilateral lateral views of the skull were also obtained. FINDINGS:   There is no definite abnormal radiotracer uptake in the area of  concern in the superior/left aspect of the L3 vertebral body on the prior MRI of  the pelvis. Therefore, this is likely to be degenerative. There is excreted radiotracer in the urinary bladder and in the perineal region.     There is symmetric degenerative radiotracer uptake involving the knees,  shoulders, and sternoclavicular joints. Likely degenerative uptake in the  cervical and thoracic spine as well. There are no definite areas of radiotracer uptake suspicious for osseous  metastatic disease. Impression  IMPRESSION:    No definite scintigraphic evidence of osseous metastatic disease. PFTs:   Date   01/12/23       FVC    1.78-45%      FEV1    1.13-38%      FEV1/FVC    64%      FEF 25-75%    0.53-20%      Bronchodilator Response    None      TLC    4.33-67%      RV    2.%      DLCO    39%        FeNO: No results found for this or any previous visit. FeNO and Likelihood of Eosinophilic Asthma   Unlikely Intermediate Likely   <25 ppb 25-50 ppb >50ppb   Exercise Oximetry:  Echo: No results found for this or any previous visit from the past 3650 days. [facility] Reference Info:                                                                                                                Exposure History:  Second Hand Smoke Exposure: Yes  Birds: No  Asbestos: No  TB: No  Hot Tubs/Humidifier: No  Organic/Inorganic Dusts: No  Molds: No  Occupation/Hobbies:   Past Medical History:   Diagnosis Date    Arrhythmia 8/2015 at [facility]    A-Flutter ablation--- dr Ashton Letters    Aspirin long-term use     CAD (coronary artery disease) 01/29/2021    PCI    CAD (coronary artery disease)     mi--2/2015 cabg 2/2015---- No stents    CAD in native artery 2/19/2015    2/19/15 (Dr Karen Torres) Coronary artery bypass grafting x2 with grafts consisting of bilateral mammaries     Cancer ([facility] [address]     prostate    Elevated hemidiaphragm 2/21/2015 2/20 barium swallow on 2/12/15 that revealed an unremarkable esophagus and paralyzed left diaphragm with an unusual rotation of the stomach in the left upper quadrant  esophagram 2/12/2015 which revealed an abnormal contour the left hemidiaphragm, suggesting diaphragmatic hernia. 2/20 Barium Swallow Barium swallow today to assess for any signs of possible torsion.  If no torsion present, will likel    Elevated hemoglobin A1c 2015    6.1 - 2/17/15     Full dentures 2015    GERD (gastroesophageal reflux disease)     controlled with med    Hernia of abdominal cavity 2015    History of atrial flutter 2015    ablation    History of complete eye exam 2015    [place] [facility]    Hypercholesterolemia     Hyperlipidemia 2015    Hyperlipidemia     Hypertension     controlled with med    Hypoxemia 2015    MI, old 2015    NSTEMI (non-ST elevated myocardial infarction) ([facility] [address] 2015    OA (osteoarthritis) 2015    Pleural effusion on right 2015    Postoperative anemia due to acute blood loss 2015    S/P CABG x 2 2015    Thrombocytopenia due to extra corporeal by-pass circulation 2015    Patient denies    Varicose veins of lower extremities with other complications     7/24/15 (Dr Chandana Aquino) L GSV RFA ablation 5/15/14 (Dr. Chandana Aquino) R GSV Radiofrequency ablation     Wears dentures         Tobacco Use      Smoking status: Former        Packs/day: 0.25        Years: 20.00        Pack years: 5        Types: Cigarettes        Quit date: 2001        Years since quittin.7        Passive exposure: Past      Smokeless tobacco: Never    No Known Allergies  Current Outpatient Medications   Medication Instructions    albuterol sulfate  (90 Base) MCG/ACT inhaler 2 puffs, EVERY 6 HOURS PRN    ascorbic acid (VITAMIN C) 500 MG tablet Oral, DAILY    aspirin 81 mg, Oral, DAILY    Calcium Carbonate-Vit D-Min (CALCIUM 600+D3 PLUS MINERALS) 600-800 MG-UNIT CHEW 1 tablet, Oral, DAILY    clopidogrel (PLAVIX) 75 mg, Oral, DAILY    Coenzyme Q10 (COQ10) 100 MG CAPS Oral, DAILY    doxazosin (CARDURA) 8 mg, Oral, 2 TIMES DAILY    ferrous sulfate (IRON 325) 325 mg, Oral, DAILY BEFORE BREAKFAST    fish oil-omega-3 fatty acids 1000 MG capsule 2 tablets, Oral    lisinopril (PRINIVIL;ZESTRIL) 20 mg, Oral, DAILY, Take 1 tablet by mouth twice a day for hypertension    metoprolol tartrate (LOPRESSOR) 12.5 mg, Oral, EVERY 12 HOURS    Multiple Vitamins-Minerals (ONE-A-DAY MENS 50+ ADVANTAGE PO) 1 tablet, Oral, DAILY    nitroGLYCERIN (NITROSTAT) 0.4 mg    omeprazole (PRILOSEC) 20 mg, Oral, DAILY, TAKE 1 CAPSULE TWO TIMES A DAY FOR GASTROESOPHAGEAL REFLUX    potassium gluconate 550 mg tablet Take 1 tab BID    pravastatin (PRAVACHOL) 80 mg, Oral, DAILY    SELENIUM PO DAILY    tamsulosin (FLOMAX) 0.4 mg, Oral, EVERY EVENING    zinc 50 MG CAPS Oral, DAILY

## 2023-02-03 DIAGNOSIS — I10 ESSENTIAL HYPERTENSION WITH GOAL BLOOD PRESSURE LESS THAN 140/90: ICD-10-CM

## 2023-02-03 RX ORDER — LISINOPRIL 20 MG/1
TABLET ORAL
Qty: 180 TABLET | Refills: 2 | OUTPATIENT
Start: 2023-02-03

## 2023-03-17 ENCOUNTER — OFFICE VISIT (OUTPATIENT)
Dept: PULMONOLOGY | Age: 78
End: 2023-03-17
Payer: MEDICARE

## 2023-03-17 VITALS
TEMPERATURE: 97.2 F | RESPIRATION RATE: 15 BRPM | OXYGEN SATURATION: 92 % | HEIGHT: 68 IN | WEIGHT: 204.1 LBS | DIASTOLIC BLOOD PRESSURE: 74 MMHG | SYSTOLIC BLOOD PRESSURE: 126 MMHG | BODY MASS INDEX: 30.93 KG/M2 | HEART RATE: 70 BPM

## 2023-03-17 DIAGNOSIS — J98.6 ELEVATED HEMIDIAPHRAGM: ICD-10-CM

## 2023-03-17 DIAGNOSIS — Z87.891 PERSONAL HISTORY OF NICOTINE DEPENDENCE: ICD-10-CM

## 2023-03-17 DIAGNOSIS — J44.9 CHRONIC OBSTRUCTIVE PULMONARY DISEASE, UNSPECIFIED COPD TYPE (HCC): ICD-10-CM

## 2023-03-17 DIAGNOSIS — R06.09 DYSPNEA ON EXERTION: Primary | ICD-10-CM

## 2023-03-17 PROCEDURE — 3074F SYST BP LT 130 MM HG: CPT | Performed by: NURSE PRACTITIONER

## 2023-03-17 PROCEDURE — 99215 OFFICE O/P EST HI 40 MIN: CPT | Performed by: NURSE PRACTITIONER

## 2023-03-17 PROCEDURE — G8427 DOCREV CUR MEDS BY ELIG CLIN: HCPCS | Performed by: NURSE PRACTITIONER

## 2023-03-17 PROCEDURE — G8417 CALC BMI ABV UP PARAM F/U: HCPCS | Performed by: NURSE PRACTITIONER

## 2023-03-17 PROCEDURE — 3078F DIAST BP <80 MM HG: CPT | Performed by: NURSE PRACTITIONER

## 2023-03-17 PROCEDURE — G8484 FLU IMMUNIZE NO ADMIN: HCPCS | Performed by: NURSE PRACTITIONER

## 2023-03-17 PROCEDURE — 3023F SPIROM DOC REV: CPT | Performed by: NURSE PRACTITIONER

## 2023-03-17 PROCEDURE — 1123F ACP DISCUSS/DSCN MKR DOCD: CPT | Performed by: NURSE PRACTITIONER

## 2023-03-17 PROCEDURE — 1036F TOBACCO NON-USER: CPT | Performed by: NURSE PRACTITIONER

## 2023-03-17 RX ORDER — ALBUTEROL SULFATE 90 UG/1
2 AEROSOL, METERED RESPIRATORY (INHALATION) EVERY 6 HOURS PRN
Qty: 18 G | Refills: 3 | Status: SHIPPED | OUTPATIENT
Start: 2023-03-17

## 2023-03-17 RX ORDER — BUDESONIDE, GLYCOPYRROLATE, AND FORMOTEROL FUMARATE 160; 9; 4.8 UG/1; UG/1; UG/1
2 AEROSOL, METERED RESPIRATORY (INHALATION) 2 TIMES DAILY
Qty: 3 EACH | Refills: 3 | Status: SHIPPED | OUTPATIENT
Start: 2023-03-17

## 2023-03-17 NOTE — PROGRESS NOTES
Name:  Misty Powell Sr. YOB: 1945   MRN: [id number]      Office Visit: 3/17/2023        ASSESSMENT AND PLAN:  (Medical Decision Making)    Impression: 81-year-old male with mild remote smoking history, increased shortness of breath and COPD. 1. Dyspnea on exertion  --Seems to be with any significant exertion and lying flat. Denies wheezing or cough with the shortness of breath  --Spirometry did show obstruction, but review of CT shows a significantly elevated left hemidiaphragm which seems to be getting worse from imaging from 2018    2. Chronic obstructive pulmonary disease, unspecified COPD type ([facility]  --Patient trial Breztri earlier last year and felt he noticed some improvement with his breathing, however he had an issue with the pharmacy and cost of the medication. He has not discussed the inhalers with his pharmacy and insurance and his family today ~$40 per inhaler and is okay to do this so is asking for a printed prescription  -- Encouraged him to take a prescription for albuterol. This is more for having a backup plan in place if symptoms get worse. He was very hesitant about this. 3. Elevated hemidiaphragm  --Noted since at least 2018. Is higher on the CT scan from 2021. Initially it looks like a diaphragmatic hernia, but upon closer elevation it is an elevated diaphragm. Had discussed surgical options with him, but since its not diaphragmatic, may need to just monitor    4. Former tobacco use  --Patient with a very minimal smoking history. No orders of the defined types were placed in this encounter. No orders of the defined types were placed in this encounter. MISTI Miller - CNP    Collaborating physician is Amaury Chávez MD      Total time for encounter on day of encounter was 44 minutes.   This time includes chart prep, review of tests/procedures, review of other provider's notes, documentation and counseling patient regarding disease process and medications. _________________________________________________________________________    HISTORY OF PRESENT ILLNESS:    Mr. Kathrine Hutton is a 69 y.o. male who [facility] for  Follow-up and COPD    Mr. Kathrine Hutton is a very pleasant 69year old male, PMH HTN, CAD, CABG in 2015, GERD, and prostate cancer, here today as a follow up for COPD. The patient had CPFTs done on 3/14/22 which revealed severe obstructive disease with FEV1 38%. He was initiated on Breztri, but unfortunately had some issues with the pharmacy cost of the inhaler. He notices improvement with his breathing when on Breztri, but is very conscious of the cost and has had issues with in the past.  He is now worked through that with his insurance and pharmacy. He does not have a rescue inhaler, so discussed uses and why it is important to have. CT chest with contrast in March 2021 showed significant elevation of the left hemidiaphragm. The patient does have a history of receiving varicose vein treatment (no stripping) and is being treated for prostate cancer (Lupron). The patient is a former 0.25 ppd X 40 years smoker, quit in 2001. REVIEW OF SYSTEMS: 10 point review of systems is negative except as reported in HPI. PHYSICAL EXAM: Body mass index is 31.03 kg/m². Vitals:    03/17/23 0932   BP: 126/74   Pulse: 70   Resp: 15   Temp: 97.2 °F (36.2 °C)   SpO2: 92%   Weight: 204 lb 1.6 oz (92.6 kg)   Height: 5' 8\" (1.727 m)         General:   Alert, cooperative, no distress, appears stated age. Eyes:   Conjunctivae/corneas clear. PERRL        Mouth/Throat:  Lips, mucosa, and tongue normal. Teeth and gums normal.        Lungs:   Left lung sounds very diminished in the bases, otherwise were clear on room air, saturation was 93%     Heart:   Regular rate and rhythm, S1, S2 normal, no murmur, click, rub or gallop. Abdomen:    Soft, non-tender.      Extremities:  Extremities normal, atraumatic, no cyanosis or edema.     Skin:  Skin color normal. No rashes or lesions     Neurologic:  A&Ox3     DIAGNOSTIC TESTS:                                                                                    LABS:   Lab Results   Component Value Date/Time    WBC 3.3 08/18/2022 12:19 PM    HGB 12.2 08/18/2022 12:19 PM    HCT 39.1 08/18/2022 12:19 PM     08/18/2022 12:19 PM    TSH 1.180 08/04/2021 11:04 AM     Imaging: I performed an independent interpretation of the patient's images. CXR:     XR CHEST STANDARD TWO VW 03/11/2021    Narrative  CHEST X-RAY, 2 views. HISTORY:  Shortness breath. TECHNIQUE: PA and lateral views. COMPARISON: December 2018. FINDINGS:  Lungs: Left hemidiaphragm is elevated to the level of the nasir. Minimal  basilar atelectasis. .  Costophrenic angles: Obscured on the left and sharp on the right. Heart size: Probably normal.  Pulmonary vasculature: is unremarkable. Aorta: Normal caliber. Included portion of the upper abdomen: is unremarkable. Bones: Sternal wires  Other: None. Impression  Persistent elevation left hemidiaphragm. No acute lung findings. Minimal subsegmental atelectasis at the bases. CT Chest:     CT ABDOMEN PELVIS W WO IV CONTRAST 03/19/2021    Narrative  CT of the Abdomen and Pelvis    INDICATION:  Right renal mass, history of prostate cancer    Multiple axial images were obtained through the abdomen and pelvis before and  after intravenous injection of 100mL of Isovue 370. Radiation dose reduction  techniques were used for this study: All CT scans performed at this facility  use one or all of the following: Automated exposure control, adjustment of the  mA and/or kVp according to patient's size, iterative reconstruction. COMPARISON: Chest CT dated 03/11/2021    FINDINGS:  - KIDNEYS/URETERS: 12 mm hyperdense lesion in the upper pole the right kidney.   Measures 56 Hounsfield units precontrast and 59 Hounsfield units postcontrast.  The lack of enhancement suggests a hemorrhagic cyst.  Multiple other simple  cysts are seen in both kidneys. There is no enhancing renal mass. - BLADDER: Mild diffuse bladder wall thickening.    - LUNG BASES: Elevated left diaphragm. Lung bases are clear.  - LIVER: Normal in size and appearance. - GALLBLADDER/BILE DUCTS: No gallstones or bile duct dilatation.  - PANCREAS: Normal.  - ADRENALS: There are bilateral renal masses. There is a 16 mm mass in the left  adrenal gland. There are 2 separate masses in the right adrenal gland, 2.2 and  1.6 cm respectively in size. Masses are stable compared with MRI dated  09/21/2019, likely adenomas. - SPLEEN: Normal.    - REPRODUCTIVE ORGANS: No discrete prostate mass.  - BOWEL: There is a right periumbilical hernia with colon involvement, no  definite obstruction. No significant bowel distention.  - LYMPH NODES: No significant retroperitoneal, mesenteric, or pelvic adenopathy.  - BONES: Normal  - VASCULATURE: Moderate atherosclerosis.  - OTHER: No ascites. Impression  1. Hemorrhagic cyst in the upper pole of the right kidney. No enhancing renal  masses. 2.  Stable bilateral adrenal masses, likely adenomas. 3.  Right periumbilical ventral hernia with colon involvement, no definite  obstruction. ** If there are any questions about this report, I can be reached on  [employer] or at [phone] **    Nuclear Medicine:   [place] 01/09/2020    Narrative  WHOLE BODY BONE SCAN    CLINICAL INDICATION: MRI pelvis 1/9/2020    COMPARISON: There are no previous relevant studies available for correlation. TECHNIQUE:  Anterior and posterior images of the whole body were obtained  approximately 3 hours after injection of mCi of Technetium-99m-HDP  intravenously. Bilateral anterior and posterior oblique images of the torso and  pelvis as well as bilateral lateral views of the skull were also obtained.     FINDINGS:   There is no definite abnormal radiotracer uptake in the area of  concern in the superior/left aspect of the L3 vertebral body on the prior MRI of  the pelvis. Therefore, this is likely to be degenerative. There is excreted radiotracer in the urinary bladder and in the perineal region. There is symmetric degenerative radiotracer uptake involving the knees,  shoulders, and sternoclavicular joints. Likely degenerative uptake in the  cervical and thoracic spine as well. There are no definite areas of radiotracer uptake suspicious for osseous  metastatic disease. Impression  IMPRESSION:    No definite scintigraphic evidence of osseous metastatic disease. PFTs:   Date  3/2022      FVC    1.78-45%      FEV1    1.13-38%      FEV1/FVC    64%      FEF 25-75%    0.53-20%      Bronchodilator Response    None      TLC    4.33-67%      RV    2.%      DLCO    39%        FeNO: No results found for this or any previous visit. FeNO and Likelihood of Eosinophilic Asthma   Unlikely Intermediate Likely   <25 ppb 25-50 ppb >50ppb   Exercise Oximetry:  Echo: No results found for this or any previous visit from the past 3650 days.     [facility] Reference Info:                                                                                                                Exposure History:  Second Hand Smoke Exposure: Yes  Birds: No  Asbestos: No  TB: No  Hot Tubs/Humidifier: No  Organic/Inorganic Dusts: No  Molds: No  Occupation/Hobbies:   Past Medical History:   Diagnosis Date    Arrhythmia 8/2015 at [facility]    A-Flutter ablation--- dr Mine Mchugh    Aspirin long-term use     CAD (coronary artery disease) 01/29/2021    PCI    CAD (coronary artery disease)     mi--2/2015 cabg 2/2015---- No stents    CAD in native artery 2/19/2015    2/19/15 (Dr Amy Eli) Coronary artery bypass grafting x2 with grafts consisting of bilateral mammaries     Cancer ([facility]     prostate    Elevated hemidiaphragm 2/21/2015 2/20 barium swallow on 2/12/15 that revealed an unremarkable esophagus and paralyzed left diaphragm with an unusual rotation of the stomach in the left upper quadrant  esophagram 2015 which revealed an abnormal contour the left hemidiaphragm, suggesting diaphragmatic hernia.  Barium Swallow Barium swallow today to assess for any signs of possible torsion.  If no torsion present, will likel    Elevated hemoglobin A1c 2015    6.1 - 2/17/15     Full dentures 2015    GERD (gastroesophageal reflux disease)     controlled with med    Hernia of abdominal cavity 2015    History of atrial flutter 2015    ablation    History of complete eye exam 2015    [facility]    Hypercholesterolemia     Hyperlipidemia 2015    Hyperlipidemia     Hypertension     controlled with med    Hypoxemia 2015    MI, old 2015    NSTEMI (non-ST elevated myocardial infarction) ([facility] 2015    OA (osteoarthritis) 2015    Pleural effusion on right 2015    Postoperative anemia due to acute blood loss 2015    S/P CABG x 2 2015    Thrombocytopenia due to extra corporeal by-pass circulation 2015    Patient denies    Varicose veins of lower extremities with other complications 3/44/3214    7/24/15 (Dr Margarita Dominique) L GSV RFA ablation 5/15/14 (Dr. Margarita Dominique) R GSV Radiofrequency ablation     Wears dentures         Tobacco Use      Smoking status: Former        Packs/day: 0.25        Years: 40.00        Pack years: 10        Types: Cigarettes        Quit date: 2001        Years since quittin.9        Passive exposure: Past      Smokeless tobacco: Never    No Known Allergies  Current Outpatient Medications   Medication Instructions    albuterol sulfate  (90 Base) MCG/ACT inhaler 2 puffs, Inhalation, EVERY 6 HOURS PRN    ascorbic acid (VITAMIN C) 500 MG tablet Oral, DAILY    aspirin 81 mg, Oral, DAILY    Calcium Carbonate-Vit D-Min (CALCIUM 600+D3 PLUS MINERALS) 600-800 MG-UNIT CHEW 1 tablet, Oral, DAILY    clopidogrel (PLAVIX) 75 mg, Oral, DAILY    Coenzyme Q10 (COQ10) 100 MG CAPS Oral, DAILY    doxazosin (CARDURA) 8 mg, Oral, 2 TIMES DAILY    ferrous sulfate (IRON 325) 325 mg, Oral, DAILY BEFORE BREAKFAST    fish oil-omega-3 fatty acids 1000 MG capsule 2 tablets, Oral    lisinopril (PRINIVIL;ZESTRIL) 20 mg, Oral, DAILY, Take 1 tablet by mouth twice a day for hypertension    metoprolol tartrate (LOPRESSOR) 12.5 mg, Oral, EVERY 12 HOURS    Multiple Vitamins-Minerals (ONE-A-DAY MENS 50+ ADVANTAGE PO) 1 tablet, Oral, DAILY    nitroGLYCERIN (NITROSTAT) 0.4 mg    omeprazole (PRILOSEC) 20 mg, Oral, DAILY, TAKE 1 CAPSULE TWO TIMES A DAY FOR GASTROESOPHAGEAL REFLUX    potassium gluconate 550 mg tablet Take 1 tab BID    pravastatin (PRAVACHOL) 80 mg, Oral, DAILY    SELENIUM PO DAILY    tamsulosin (FLOMAX) 0.4 mg, Oral, EVERY EVENING    zinc 50 MG CAPS Oral, DAILY

## 2023-03-17 NOTE — PATIENT INSTRUCTIONS
We understand that insurance companies have different 'preferred' medications. These preferences can change yearly and can be difficult to track. Depending upon your insurance and their preferred medicines, some medications we prescribe may be too expensive. If this happens, we recommend that you find out what inhalers for COPD or asthma are \"preferred\" on your insurance drug formulary by calling the member services phone number on the back of the insurance card. The cost of your medication can be dramatically different depending on this. Once the preferred inhalers are known, please send us a message in [employer] or call us back at [phone] with this information. We will then choose the best option available for you and send that prescription to your pharmacy on file.     ICS Inhalers:  Fluticasone inhaler (Flovent, Arnuity)  Qvar  Pulmicort  Asmanex  Alvesco    ICS/LABA Inhalers:  Fluticasone/Salmeterol inhaler (Advair, Airduo, Wixela)  Symbicort  Dulera  Breo    LABA/LAMA Inhalers:  Stiolto  Anoro  Bevespi  Duaklir    ICS/LABA/LAMA Inhalers:  Trelegy  Breztri    Short Acting Inhaler:  Albuterol (ProAir HFA/Respiclick/Digihaler, Ventolin, Proventil)  Levalbuterol (Xopenex)  Atrovent  Combivent

## 2023-05-10 ENCOUNTER — TELEPHONE (OUTPATIENT)
Dept: PULMONOLOGY | Age: 78
End: 2023-05-10

## 2023-05-10 DIAGNOSIS — K46.9 HERNIA OF ABDOMINAL CAVITY: ICD-10-CM

## 2023-05-10 DIAGNOSIS — G93.5 HERNIA CEREBRI (HCC): Primary | ICD-10-CM

## 2023-06-01 ENCOUNTER — OFFICE VISIT (OUTPATIENT)
Dept: SURGERY | Age: 78
End: 2023-06-01
Payer: MEDICARE

## 2023-06-01 VITALS
HEIGHT: 68 IN | SYSTOLIC BLOOD PRESSURE: 132 MMHG | WEIGHT: 204 LBS | BODY MASS INDEX: 30.92 KG/M2 | DIASTOLIC BLOOD PRESSURE: 74 MMHG | OXYGEN SATURATION: 99 % | HEART RATE: 69 BPM

## 2023-06-01 DIAGNOSIS — K43.2 RECURRENT INCISIONAL HERNIA: Primary | ICD-10-CM

## 2023-06-01 PROCEDURE — G8427 DOCREV CUR MEDS BY ELIG CLIN: HCPCS | Performed by: SURGERY

## 2023-06-01 PROCEDURE — 1123F ACP DISCUSS/DSCN MKR DOCD: CPT | Performed by: SURGERY

## 2023-06-01 PROCEDURE — 99203 OFFICE O/P NEW LOW 30 MIN: CPT | Performed by: SURGERY

## 2023-06-01 PROCEDURE — 3078F DIAST BP <80 MM HG: CPT | Performed by: SURGERY

## 2023-06-01 PROCEDURE — 1036F TOBACCO NON-USER: CPT | Performed by: SURGERY

## 2023-06-01 PROCEDURE — 3075F SYST BP GE 130 - 139MM HG: CPT | Performed by: SURGERY

## 2023-06-01 PROCEDURE — G8417 CALC BMI ABV UP PARAM F/U: HCPCS | Performed by: SURGERY

## 2023-06-01 NOTE — PROGRESS NOTES
[facility]  [address] [place] [address]  [phone]    Office Note/H&P/Consult Note   Armando Lyle Sr. MRN: [id number]     : 1945        HPI: Armando Lyle Sr. is a 69 y.o. male who is here today to see me with a recurrent incisional hernia. This is been present for about 2 years he states. Does not really cause him much problems. It is enlarging and he would like to have it repaired. He has had at least 3 abdominal wall hernias fixed. The last one was a recurrent umbilical hernia performed by Dr. Mee Calderón on 2018. He did use a mesh patch overlay. No fever or chills. No change in bowel habits. Past Medical History:   Diagnosis Date    Arrhythmia 2015 at [facility]    A-Flutter ablation--- dr Mary Dutton    Aspirin long-term use     CAD (coronary artery disease) 2021    PCI    CAD (coronary artery disease)     mi--2015 cabg 2015---- No stents    CAD in native artery 2015    2/19/15 (Dr Emely Gentile) Coronary artery bypass grafting x2 with grafts consisting of bilateral mammaries     Cancer ([facility]     prostate    Elevated hemidiaphragm 2015 barium swallow on 2/12/15 that revealed an unremarkable esophagus and paralyzed left diaphragm with an unusual rotation of the stomach in the left upper quadrant  esophagram 2015 which revealed an abnormal contour the left hemidiaphragm, suggesting diaphragmatic hernia.  Barium Swallow Barium swallow today to assess for any signs of possible torsion.  If no torsion present, will likel    Elevated hemoglobin A1c 2015    6.1 - 2/17/15     Full dentures 2015    GERD (gastroesophageal reflux disease)     controlled with med    Hernia of abdominal cavity 2015    History of atrial flutter 2015    ablation    History of complete eye exam 2015    [facility]    Hypercholesterolemia     Hyperlipidemia 2015    Hyperlipidemia     Hypertension     controlled with

## 2023-06-09 ENCOUNTER — HOSPITAL ENCOUNTER (OUTPATIENT)
Dept: CT IMAGING | Age: 78
End: 2023-06-09
Attending: SURGERY
Payer: MEDICARE

## 2023-06-09 DIAGNOSIS — K43.2 RECURRENT INCISIONAL HERNIA: ICD-10-CM

## 2023-06-09 LAB — CREAT BLD-MCNC: 0.79 MG/DL (ref 0.8–1.5)

## 2023-06-09 PROCEDURE — 6360000004 HC RX CONTRAST MEDICATION: Performed by: SURGERY

## 2023-06-09 PROCEDURE — 82565 ASSAY OF CREATININE: CPT

## 2023-06-09 PROCEDURE — 74177 CT ABD & PELVIS W/CONTRAST: CPT

## 2023-06-09 PROCEDURE — 2580000003 HC RX 258: Performed by: SURGERY

## 2023-06-09 RX ORDER — SODIUM CHLORIDE 0.9 % (FLUSH) 0.9 %
10 SYRINGE (ML) INJECTION
Status: ACTIVE | OUTPATIENT
Start: 2023-06-09

## 2023-06-09 RX ORDER — 0.9 % SODIUM CHLORIDE 0.9 %
100 INTRAVENOUS SOLUTION INTRAVENOUS
Status: COMPLETED | OUTPATIENT
Start: 2023-06-09 | End: 2023-06-09

## 2023-06-09 RX ADMIN — IOPAMIDOL 100 ML: 755 INJECTION, SOLUTION INTRAVENOUS at 16:05

## 2023-06-09 RX ADMIN — SODIUM CHLORIDE 100 ML: 9 INJECTION, SOLUTION INTRAVENOUS at 16:05

## 2023-06-09 RX ADMIN — DIATRIZOATE MEGLUMINE AND DIATRIZOATE SODIUM 15 ML: 660; 100 LIQUID ORAL; RECTAL at 16:04

## 2023-06-20 ENCOUNTER — PREP FOR PROCEDURE (OUTPATIENT)
Dept: SURGERY | Age: 78
End: 2023-06-20

## 2023-06-20 ENCOUNTER — TELEPHONE (OUTPATIENT)
Dept: FAMILY MEDICINE CLINIC | Facility: CLINIC | Age: 78
End: 2023-06-20

## 2023-06-20 ENCOUNTER — OFFICE VISIT (OUTPATIENT)
Dept: SURGERY | Age: 78
End: 2023-06-20
Payer: MEDICARE

## 2023-06-20 VITALS — HEIGHT: 68 IN | WEIGHT: 204 LBS | BODY MASS INDEX: 30.92 KG/M2

## 2023-06-20 DIAGNOSIS — K43.2 RECURRENT INCISIONAL HERNIA: Primary | ICD-10-CM

## 2023-06-20 PROCEDURE — G8417 CALC BMI ABV UP PARAM F/U: HCPCS | Performed by: SURGERY

## 2023-06-20 PROCEDURE — 1123F ACP DISCUSS/DSCN MKR DOCD: CPT | Performed by: SURGERY

## 2023-06-20 PROCEDURE — 1036F TOBACCO NON-USER: CPT | Performed by: SURGERY

## 2023-06-20 PROCEDURE — G8427 DOCREV CUR MEDS BY ELIG CLIN: HCPCS | Performed by: SURGERY

## 2023-06-20 PROCEDURE — 99214 OFFICE O/P EST MOD 30 MIN: CPT | Performed by: SURGERY

## 2023-06-20 NOTE — PROGRESS NOTES
[facility]  [address] [place] [address], [address] [place] [address]  [phone]    Office Note/H&P/Consult Note   Agustin Morin Sr. MRN: [id number]     : 1945        HPI: Agustin Morin Sr. is a 69 y.o. male who is back to see me today after having a CT. This shows a stable incisional hernia. Mouth is about 2.5 cm. No obstruction. He has been in pain all night. He has taken a laxative this morning. The last thing he had to drink was at 730 this morning with a laxative. He denies any abdominal pain. Hernia typically stays out but occasionally goes and when he is in bed. No nausea or vomiting. Past Medical History:   Diagnosis Date    Arrhythmia 2015 at [facility]    A-Flutter ablation--- dr Leida Kawasaki    Aspirin long-term use     CAD (coronary artery disease) 2021    PCI    CAD (coronary artery disease)     mi--2015 cabg 2015---- No stents    CAD in native artery 2015    2/19/15 (Dr Natale Severs) Coronary artery bypass grafting x2 with grafts consisting of bilateral mammaries     Cancer ([facility]     prostate    Elevated hemidiaphragm 2015 barium swallow on 2/12/15 that revealed an unremarkable esophagus and paralyzed left diaphragm with an unusual rotation of the stomach in the left upper quadrant  esophagram 2015 which revealed an abnormal contour the left hemidiaphragm, suggesting diaphragmatic hernia.  Barium Swallow Barium swallow today to assess for any signs of possible torsion.  If no torsion present, will likel    Elevated hemoglobin A1c 2015    6.1 - 2/17/15     Full dentures 2015    GERD (gastroesophageal reflux disease)     controlled with med    Hernia of abdominal cavity 2015    History of atrial flutter 2015    ablation    History of complete eye exam 2015    [facility]    Hypercholesterolemia     Hyperlipidemia 2015    Hyperlipidemia     Hypertension     controlled with med    Hypoxemia 2015    MI,

## 2023-06-21 RX ORDER — SODIUM CHLORIDE 9 MG/ML
INJECTION, SOLUTION INTRAVENOUS PRN
Status: CANCELLED | OUTPATIENT
Start: 2023-06-21

## 2023-06-21 RX ORDER — SODIUM CHLORIDE 0.9 % (FLUSH) 0.9 %
5-40 SYRINGE (ML) INJECTION PRN
Status: CANCELLED | OUTPATIENT
Start: 2023-06-21

## 2023-06-21 RX ORDER — SODIUM CHLORIDE 0.9 % (FLUSH) 0.9 %
5-40 SYRINGE (ML) INJECTION EVERY 12 HOURS SCHEDULED
Status: CANCELLED | OUTPATIENT
Start: 2023-06-21

## 2023-06-23 ENCOUNTER — ANESTHESIA EVENT (OUTPATIENT)
Dept: SURGERY | Age: 78
End: 2023-06-23
Payer: MEDICARE

## 2023-06-23 ENCOUNTER — HOSPITAL ENCOUNTER (INPATIENT)
Age: 78
LOS: 3 days | Discharge: HOME | DRG: 354 | End: 2023-06-29
Attending: SURGERY | Admitting: SURGERY
Payer: MEDICARE

## 2023-06-23 ENCOUNTER — ANESTHESIA (OUTPATIENT)
Dept: SURGERY | Age: 78
End: 2023-06-23
Payer: MEDICARE

## 2023-06-23 DIAGNOSIS — K43.0 INCARCERATED INCISIONAL HERNIA: Primary | ICD-10-CM

## 2023-06-23 LAB — HGB BLD-MCNC: 13.2 G/DL (ref 13.6–17.2)

## 2023-06-23 PROCEDURE — 2580000003 HC RX 258: Performed by: ANESTHESIOLOGY

## 2023-06-23 PROCEDURE — 2500000003 HC RX 250 WO HCPCS: Performed by: REGISTERED NURSE

## 2023-06-23 PROCEDURE — 3600000009 HC SURGERY ROBOT BASE: Performed by: SURGERY

## 2023-06-23 PROCEDURE — 0WUF0JZ SUPPLEMENT ABDOMINAL WALL WITH SYNTHETIC SUBSTITUTE, OPEN APPROACH: ICD-10-PCS | Performed by: SURGERY

## 2023-06-23 PROCEDURE — 85018 HEMOGLOBIN: CPT

## 2023-06-23 PROCEDURE — 2500000003 HC RX 250 WO HCPCS: Performed by: SURGERY

## 2023-06-23 PROCEDURE — G0378 HOSPITAL OBSERVATION PER HR: HCPCS

## 2023-06-23 PROCEDURE — 6370000000 HC RX 637 (ALT 250 FOR IP): Performed by: ANESTHESIOLOGY

## 2023-06-23 PROCEDURE — 2500000003 HC RX 250 WO HCPCS

## 2023-06-23 PROCEDURE — 3600000019 HC SURGERY ROBOT ADDTL 15MIN: Performed by: SURGERY

## 2023-06-23 PROCEDURE — 7100000001 HC PACU RECOVERY - ADDTL 15 MIN: Performed by: SURGERY

## 2023-06-23 PROCEDURE — 6360000002 HC RX W HCPCS: Performed by: REGISTERED NURSE

## 2023-06-23 PROCEDURE — 6370000000 HC RX 637 (ALT 250 FOR IP): Performed by: NURSE PRACTITIONER

## 2023-06-23 PROCEDURE — 6360000002 HC RX W HCPCS: Performed by: ANESTHESIOLOGY

## 2023-06-23 PROCEDURE — 6360000002 HC RX W HCPCS

## 2023-06-23 PROCEDURE — 2709999900 HC NON-CHARGEABLE SUPPLY: Performed by: SURGERY

## 2023-06-23 PROCEDURE — 2580000003 HC RX 258

## 2023-06-23 PROCEDURE — C1781 MESH (IMPLANTABLE): HCPCS | Performed by: SURGERY

## 2023-06-23 PROCEDURE — S2900 ROBOTIC SURGICAL SYSTEM: HCPCS | Performed by: SURGERY

## 2023-06-23 PROCEDURE — 2580000003 HC RX 258: Performed by: SURGERY

## 2023-06-23 PROCEDURE — 3700000001 HC ADD 15 MINUTES (ANESTHESIA): Performed by: SURGERY

## 2023-06-23 PROCEDURE — 7100000000 HC PACU RECOVERY - FIRST 15 MIN: Performed by: SURGERY

## 2023-06-23 PROCEDURE — 6360000002 HC RX W HCPCS: Performed by: SURGERY

## 2023-06-23 PROCEDURE — 2720000010 HC SURG SUPPLY STERILE: Performed by: SURGERY

## 2023-06-23 PROCEDURE — 3700000000 HC ANESTHESIA ATTENDED CARE: Performed by: SURGERY

## 2023-06-23 DEVICE — MESH HERN M W4.3XL5.5IN POLYPR EPTFE OVL SELF EXP PTCH FOR: Type: IMPLANTABLE DEVICE | Site: ABDOMEN | Status: FUNCTIONAL

## 2023-06-23 RX ORDER — SODIUM CHLORIDE 0.9 % (FLUSH) 0.9 %
5-40 SYRINGE (ML) INJECTION EVERY 12 HOURS SCHEDULED
Status: DISCONTINUED | OUTPATIENT
Start: 2023-06-23 | End: 2023-06-29 | Stop reason: HOSPADM

## 2023-06-23 RX ORDER — DOXAZOSIN MESYLATE 4 MG/1
8 TABLET ORAL NIGHTLY
Status: DISCONTINUED | OUTPATIENT
Start: 2023-06-24 | End: 2023-06-29 | Stop reason: HOSPADM

## 2023-06-23 RX ORDER — GLYCOPYRROLATE 0.2 MG/ML
INJECTION INTRAMUSCULAR; INTRAVENOUS PRN
Status: DISCONTINUED | OUTPATIENT
Start: 2023-06-23 | End: 2023-06-23 | Stop reason: SDUPTHER

## 2023-06-23 RX ORDER — SODIUM CHLORIDE, SODIUM LACTATE, POTASSIUM CHLORIDE, CALCIUM CHLORIDE 600; 310; 30; 20 MG/100ML; MG/100ML; MG/100ML; MG/100ML
INJECTION, SOLUTION INTRAVENOUS CONTINUOUS
Status: DISCONTINUED | OUTPATIENT
Start: 2023-06-23 | End: 2023-06-23

## 2023-06-23 RX ORDER — SODIUM CHLORIDE 9 MG/ML
INJECTION, SOLUTION INTRAVENOUS PRN
Status: DISCONTINUED | OUTPATIENT
Start: 2023-06-23 | End: 2023-06-23 | Stop reason: HOSPADM

## 2023-06-23 RX ORDER — MIDAZOLAM HYDROCHLORIDE 2 MG/2ML
2 INJECTION, SOLUTION INTRAMUSCULAR; INTRAVENOUS
Status: DISCONTINUED | OUTPATIENT
Start: 2023-06-23 | End: 2023-06-23 | Stop reason: HOSPADM

## 2023-06-23 RX ORDER — ONDANSETRON 2 MG/ML
INJECTION INTRAMUSCULAR; INTRAVENOUS PRN
Status: DISCONTINUED | OUTPATIENT
Start: 2023-06-23 | End: 2023-06-23 | Stop reason: SDUPTHER

## 2023-06-23 RX ORDER — PROCHLORPERAZINE EDISYLATE 5 MG/ML
5 INJECTION INTRAMUSCULAR; INTRAVENOUS
Status: DISCONTINUED | OUTPATIENT
Start: 2023-06-23 | End: 2023-06-23 | Stop reason: HOSPADM

## 2023-06-23 RX ORDER — DEXTROSE AND SODIUM CHLORIDE 5; .45 G/100ML; G/100ML
INJECTION, SOLUTION INTRAVENOUS CONTINUOUS
Status: DISCONTINUED | OUTPATIENT
Start: 2023-06-23 | End: 2023-06-27

## 2023-06-23 RX ORDER — FENTANYL CITRATE 50 UG/ML
INJECTION, SOLUTION INTRAMUSCULAR; INTRAVENOUS PRN
Status: DISCONTINUED | OUTPATIENT
Start: 2023-06-23 | End: 2023-06-23 | Stop reason: SDUPTHER

## 2023-06-23 RX ORDER — LIDOCAINE HYDROCHLORIDE 20 MG/ML
INJECTION, SOLUTION EPIDURAL; INFILTRATION; INTRACAUDAL; PERINEURAL PRN
Status: DISCONTINUED | OUTPATIENT
Start: 2023-06-23 | End: 2023-06-23 | Stop reason: SDUPTHER

## 2023-06-23 RX ORDER — DOXAZOSIN MESYLATE 4 MG/1
8 TABLET ORAL DAILY
Status: DISCONTINUED | OUTPATIENT
Start: 2023-06-24 | End: 2023-06-23 | Stop reason: SDUPTHER

## 2023-06-23 RX ORDER — MEPERIDINE HYDROCHLORIDE 25 MG/ML
12.5 INJECTION INTRAMUSCULAR; INTRAVENOUS; SUBCUTANEOUS EVERY 5 MIN PRN
Status: DISCONTINUED | OUTPATIENT
Start: 2023-06-23 | End: 2023-06-23 | Stop reason: HOSPADM

## 2023-06-23 RX ORDER — TAMSULOSIN HYDROCHLORIDE 0.4 MG/1
0.4 CAPSULE ORAL DAILY
Status: DISCONTINUED | OUTPATIENT
Start: 2023-06-24 | End: 2023-06-29 | Stop reason: HOSPADM

## 2023-06-23 RX ORDER — BUPIVACAINE HYDROCHLORIDE 5 MG/ML
INJECTION, SOLUTION EPIDURAL; INTRACAUDAL PRN
Status: DISCONTINUED | OUTPATIENT
Start: 2023-06-23 | End: 2023-06-23 | Stop reason: ALTCHOICE

## 2023-06-23 RX ORDER — LIDOCAINE HYDROCHLORIDE 10 MG/ML
1 INJECTION, SOLUTION INFILTRATION; PERINEURAL
Status: DISCONTINUED | OUTPATIENT
Start: 2023-06-23 | End: 2023-06-23 | Stop reason: HOSPADM

## 2023-06-23 RX ORDER — SODIUM CHLORIDE 0.9 % (FLUSH) 0.9 %
5-40 SYRINGE (ML) INJECTION EVERY 12 HOURS SCHEDULED
Status: DISCONTINUED | OUTPATIENT
Start: 2023-06-23 | End: 2023-06-23 | Stop reason: HOSPADM

## 2023-06-23 RX ORDER — ONDANSETRON 2 MG/ML
4 INJECTION INTRAMUSCULAR; INTRAVENOUS
Status: DISCONTINUED | OUTPATIENT
Start: 2023-06-23 | End: 2023-06-23 | Stop reason: HOSPADM

## 2023-06-23 RX ORDER — PROPOFOL 10 MG/ML
INJECTION, EMULSION INTRAVENOUS PRN
Status: DISCONTINUED | OUTPATIENT
Start: 2023-06-23 | End: 2023-06-23 | Stop reason: SDUPTHER

## 2023-06-23 RX ORDER — ROCURONIUM BROMIDE 10 MG/ML
INJECTION, SOLUTION INTRAVENOUS PRN
Status: DISCONTINUED | OUTPATIENT
Start: 2023-06-23 | End: 2023-06-23 | Stop reason: SDUPTHER

## 2023-06-23 RX ORDER — ACETAMINOPHEN 500 MG
1000 TABLET ORAL ONCE
Status: COMPLETED | OUTPATIENT
Start: 2023-06-23 | End: 2023-06-23

## 2023-06-23 RX ORDER — SODIUM CHLORIDE 9 MG/ML
INJECTION, SOLUTION INTRAVENOUS PRN
Status: DISCONTINUED | OUTPATIENT
Start: 2023-06-23 | End: 2023-06-29 | Stop reason: HOSPADM

## 2023-06-23 RX ORDER — HYDROMORPHONE HYDROCHLORIDE 1 MG/ML
0.5 INJECTION, SOLUTION INTRAMUSCULAR; INTRAVENOUS; SUBCUTANEOUS
Status: DISCONTINUED | OUTPATIENT
Start: 2023-06-23 | End: 2023-06-29 | Stop reason: HOSPADM

## 2023-06-23 RX ORDER — SODIUM CHLORIDE 0.9 % (FLUSH) 0.9 %
5-40 SYRINGE (ML) INJECTION PRN
Status: DISCONTINUED | OUTPATIENT
Start: 2023-06-23 | End: 2023-06-23 | Stop reason: HOSPADM

## 2023-06-23 RX ORDER — HYDROMORPHONE HYDROCHLORIDE 2 MG/ML
0.5 INJECTION, SOLUTION INTRAMUSCULAR; INTRAVENOUS; SUBCUTANEOUS EVERY 5 MIN PRN
Status: DISCONTINUED | OUTPATIENT
Start: 2023-06-23 | End: 2023-06-23 | Stop reason: HOSPADM

## 2023-06-23 RX ORDER — LISINOPRIL 20 MG/1
40 TABLET ORAL DAILY
Status: DISCONTINUED | OUTPATIENT
Start: 2023-06-24 | End: 2023-06-29 | Stop reason: HOSPADM

## 2023-06-23 RX ORDER — NEOSTIGMINE METHYLSULFATE 1 MG/ML
INJECTION, SOLUTION INTRAVENOUS PRN
Status: DISCONTINUED | OUTPATIENT
Start: 2023-06-23 | End: 2023-06-23 | Stop reason: SDUPTHER

## 2023-06-23 RX ORDER — ONDANSETRON 4 MG/1
4 TABLET, ORALLY DISINTEGRATING ORAL EVERY 8 HOURS PRN
Status: DISCONTINUED | OUTPATIENT
Start: 2023-06-23 | End: 2023-06-29 | Stop reason: HOSPADM

## 2023-06-23 RX ORDER — ONDANSETRON 2 MG/ML
4 INJECTION INTRAMUSCULAR; INTRAVENOUS EVERY 6 HOURS PRN
Status: DISCONTINUED | OUTPATIENT
Start: 2023-06-23 | End: 2023-06-29 | Stop reason: HOSPADM

## 2023-06-23 RX ORDER — SODIUM CHLORIDE 0.9 % (FLUSH) 0.9 %
5-40 SYRINGE (ML) INJECTION PRN
Status: DISCONTINUED | OUTPATIENT
Start: 2023-06-23 | End: 2023-06-29 | Stop reason: HOSPADM

## 2023-06-23 RX ORDER — PRAVASTATIN SODIUM 80 MG/1
80 TABLET ORAL NIGHTLY
Status: DISCONTINUED | OUTPATIENT
Start: 2023-06-24 | End: 2023-06-29 | Stop reason: HOSPADM

## 2023-06-23 RX ORDER — PANTOPRAZOLE SODIUM 40 MG/1
40 TABLET, DELAYED RELEASE ORAL DAILY
Status: DISCONTINUED | OUTPATIENT
Start: 2023-06-24 | End: 2023-06-29 | Stop reason: HOSPADM

## 2023-06-23 RX ORDER — DEXAMETHASONE SODIUM PHOSPHATE 4 MG/ML
INJECTION, SOLUTION INTRA-ARTICULAR; INTRALESIONAL; INTRAMUSCULAR; INTRAVENOUS; SOFT TISSUE PRN
Status: DISCONTINUED | OUTPATIENT
Start: 2023-06-23 | End: 2023-06-23 | Stop reason: SDUPTHER

## 2023-06-23 RX ORDER — OXYCODONE HYDROCHLORIDE 5 MG/1
5 TABLET ORAL
Status: COMPLETED | OUTPATIENT
Start: 2023-06-23 | End: 2023-06-23

## 2023-06-23 RX ORDER — EPHEDRINE SULFATE/0.9% NACL/PF 50 MG/5 ML
SYRINGE (ML) INTRAVENOUS PRN
Status: DISCONTINUED | OUTPATIENT
Start: 2023-06-23 | End: 2023-06-23 | Stop reason: SDUPTHER

## 2023-06-23 RX ADMIN — PHENYLEPHRINE HYDROCHLORIDE 100 MCG: 10 INJECTION INTRAVENOUS at 15:35

## 2023-06-23 RX ADMIN — ONDANSETRON 4 MG: 2 INJECTION INTRAMUSCULAR; INTRAVENOUS at 16:11

## 2023-06-23 RX ADMIN — ACETAMINOPHEN 1000 MG: 500 TABLET, FILM COATED ORAL at 14:06

## 2023-06-23 RX ADMIN — Medication 3 MG: at 16:20

## 2023-06-23 RX ADMIN — LIDOCAINE HYDROCHLORIDE 60 MG: 20 INJECTION, SOLUTION EPIDURAL; INFILTRATION; INTRACAUDAL; PERINEURAL at 14:26

## 2023-06-23 RX ADMIN — PRAVASTATIN SODIUM 80 MG: 80 TABLET ORAL at 23:50

## 2023-06-23 RX ADMIN — DEXTROSE AND SODIUM CHLORIDE: 5; 450 INJECTION, SOLUTION INTRAVENOUS at 21:15

## 2023-06-23 RX ADMIN — METOPROLOL TARTRATE 12.5 MG: 25 TABLET, FILM COATED ORAL at 23:49

## 2023-06-23 RX ADMIN — FENTANYL CITRATE 50 MCG: 50 INJECTION, SOLUTION INTRAMUSCULAR; INTRAVENOUS at 15:51

## 2023-06-23 RX ADMIN — DEXAMETHASONE SODIUM PHOSPHATE 4 MG: 4 INJECTION, SOLUTION INTRAMUSCULAR; INTRAVENOUS at 16:11

## 2023-06-23 RX ADMIN — HYDROMORPHONE HYDROCHLORIDE 0.5 MG: 2 INJECTION, SOLUTION INTRAMUSCULAR; INTRAVENOUS; SUBCUTANEOUS at 17:30

## 2023-06-23 RX ADMIN — PHENYLEPHRINE HYDROCHLORIDE 100 MCG: 10 INJECTION INTRAVENOUS at 14:44

## 2023-06-23 RX ADMIN — PROPOFOL 200 MG: 10 INJECTION, EMULSION INTRAVENOUS at 14:26

## 2023-06-23 RX ADMIN — HYDROMORPHONE HYDROCHLORIDE 0.5 MG: 1 INJECTION, SOLUTION INTRAMUSCULAR; INTRAVENOUS; SUBCUTANEOUS at 21:14

## 2023-06-23 RX ADMIN — Medication 10 MG: at 14:43

## 2023-06-23 RX ADMIN — Medication 2000 MG: at 14:39

## 2023-06-23 RX ADMIN — GLYCOPYRROLATE 0.4 MG: 0.2 INJECTION INTRAMUSCULAR; INTRAVENOUS at 16:20

## 2023-06-23 RX ADMIN — FENTANYL CITRATE 50 MCG: 50 INJECTION, SOLUTION INTRAMUSCULAR; INTRAVENOUS at 14:24

## 2023-06-23 RX ADMIN — SODIUM CHLORIDE, PRESERVATIVE FREE 10 ML: 5 INJECTION INTRAVENOUS at 21:15

## 2023-06-23 RX ADMIN — PHENYLEPHRINE HYDROCHLORIDE 100 MCG: 10 INJECTION INTRAVENOUS at 14:51

## 2023-06-23 RX ADMIN — SODIUM CHLORIDE, SODIUM LACTATE, POTASSIUM CHLORIDE, AND CALCIUM CHLORIDE: 600; 310; 30; 20 INJECTION, SOLUTION INTRAVENOUS at 15:50

## 2023-06-23 RX ADMIN — ROCURONIUM BROMIDE 10 MG: 50 INJECTION, SOLUTION INTRAVENOUS at 14:44

## 2023-06-23 RX ADMIN — ROCURONIUM BROMIDE 10 MG: 50 INJECTION, SOLUTION INTRAVENOUS at 15:27

## 2023-06-23 RX ADMIN — PHENYLEPHRINE HYDROCHLORIDE 100 MCG: 10 INJECTION INTRAVENOUS at 15:07

## 2023-06-23 RX ADMIN — Medication 10 MG: at 14:40

## 2023-06-23 RX ADMIN — OXYCODONE HYDROCHLORIDE 5 MG: 5 TABLET ORAL at 17:30

## 2023-06-23 RX ADMIN — ROCURONIUM BROMIDE 40 MG: 50 INJECTION, SOLUTION INTRAVENOUS at 14:26

## 2023-06-23 RX ADMIN — SODIUM CHLORIDE, SODIUM LACTATE, POTASSIUM CHLORIDE, AND CALCIUM CHLORIDE: 600; 310; 30; 20 INJECTION, SOLUTION INTRAVENOUS at 14:05

## 2023-06-23 ASSESSMENT — PAIN DESCRIPTION - ORIENTATION
ORIENTATION: ANTERIOR
ORIENTATION: ANTERIOR;MID

## 2023-06-23 ASSESSMENT — PAIN SCALES - GENERAL
PAINLEVEL_OUTOF10: 6
PAINLEVEL_OUTOF10: 8
PAINLEVEL_OUTOF10: 9

## 2023-06-23 ASSESSMENT — PAIN - FUNCTIONAL ASSESSMENT
PAIN_FUNCTIONAL_ASSESSMENT: 0-10
PAIN_FUNCTIONAL_ASSESSMENT: PREVENTS OR INTERFERES SOME ACTIVE ACTIVITIES AND ADLS
PAIN_FUNCTIONAL_ASSESSMENT: 0-10

## 2023-06-23 ASSESSMENT — PAIN DESCRIPTION - LOCATION
LOCATION: ABDOMEN
LOCATION: ABDOMEN

## 2023-06-23 ASSESSMENT — PAIN DESCRIPTION - DESCRIPTORS
DESCRIPTORS: SORE
DESCRIPTORS: DULL
DESCRIPTORS: ACHING

## 2023-06-23 ASSESSMENT — PAIN SCALES - WONG BAKER: WONGBAKER_NUMERICALRESPONSE: 0

## 2023-06-23 NOTE — ANESTHESIA PROCEDURE NOTES
Airway  Date/Time: 6/23/2023 2:28 PM  Urgency: elective    Airway not difficult    General Information and Staff    Patient location during procedure: OR  Performed: resident/CRNA     Indications and Patient Condition  Indications for airway management: anesthesia  Spontaneous Ventilation: absent  Sedation level: deep  Preoxygenated: yes  Patient position: sniffing  MILS not maintained throughout  Mask difficulty assessment: difficult bag mask (inadequate, unstable or two providers) +/- NMBA    Final Airway Details  Final airway type: endotracheal airway      Successful airway: ETT  Cuffed: yes   Successful intubation technique: direct laryngoscopy  Facilitating devices/methods: intubating stylet  Endotracheal tube insertion site: oral  Blade: Zachary  Blade size: #4  ETT size (mm): 8.0  Cormack-Lehane Classification: grade I - full view of glottis  Placement verified by: chest auscultation and capnometry   Measured from: lips  Number of attempts at approach: 1  Ventilation between attempts: bag mask  Number of other approaches attempted: 0    no

## 2023-06-23 NOTE — ANESTHESIA PRE PROCEDURE
[id number], BEART, [id number]     Type & Screen (If Applicable):  No results found for: LABABO, LABRH    Drug/Infectious Status (If Applicable):  Lab Results   Component Value Date/Time    HEPCAB <0.1 11/14/2017 08:32 AM       COVID-19 Screening (If Applicable):   Lab Results   Component Value Date/Time    COVID19 Not Detected 03/10/2022 01:02 PM    COVID19 Performed 03/10/2022 01:02 PM           Anesthesia Evaluation    Airway: Mallampati: I  TM distance: >3 FB   Neck ROM: full     Dental:    (+) upper dentures and lower dentures      Pulmonary:normal exam  breath sounds clear to auscultation                            ROS comment: Elevated left hemidiaphragm, ?paralyzed left phrenic   Cardiovascular:  Exercise tolerance: good (>4 METS),   (+) hypertension:, past MI (NSTEMI):, CAD:, CABG/stent (s/p 2v CABG 2015, s/p stent x 2 in 2021):, dysrhythmias (H/o Aflutter s/p ablation):, hyperlipidemia        Rhythm: regular  Rate: normal                    Neuro/Psych:               GI/Hepatic/Renal:   (+) GERD: well controlled,           Endo/Other:    (+) : arthritis: OA., .                 Abdominal:              PE comment: Deferred   Vascular: Other Findings:           Anesthesia Plan      general     ASA 3       Induction: intravenous. Anesthetic plan and risks discussed with patient.                         Yamil Hayes MD   6/23/2023

## 2023-06-23 NOTE — ANESTHESIA POSTPROCEDURE EVALUATION
Department of Anesthesiology  Postprocedure Note    Patient: Payton Cueva Sr.   MRN: [id number]  YOB: 1945  Date of evaluation: 6/23/2023      Procedure Summary     Date: 06/23/23 Room / Location: [facility] MAIN OR 09 / [facility] MAIN OR    Anesthesia Start: 1415 Anesthesia Stop: 1642    Procedure: OPEN INCISIONAL HERNIA REPAIR WITH MESH (Abdomen) Diagnosis:       Incisional hernia      (Incisional hernia [K43.2])    Providers: Liliana Sparrow MD Responsible Provider: Brad Stevenson MD    Anesthesia Type: General ASA Status: 3          Anesthesia Type: General    Emilio Phase I: Emilio Score: 8    Emilio Phase II:        Anesthesia Post Evaluation    Patient location during evaluation: PACU  Patient participation: complete - patient participated  Level of consciousness: awake and alert  Airway patency: patent  Nausea & Vomiting: no nausea and no vomiting  Complications: no  Cardiovascular status: hemodynamically stable  Respiratory status: acceptable, nonlabored ventilation and spontaneous ventilation  Hydration status: euvolemic  Comments: BP (!) 155/75   Pulse 77   Temp 97.6 °F (36.4 °C) (Skin)   Resp 18   Ht 5' 9\" (1.753 m)   Wt 202 lb 3.2 oz (91.7 kg)   SpO2 100%   BMI 29.86 kg/m²     Multimodal analgesia pain management approach

## 2023-06-24 PROCEDURE — 94761 N-INVAS EAR/PLS OXIMETRY MLT: CPT

## 2023-06-24 PROCEDURE — 6370000000 HC RX 637 (ALT 250 FOR IP): Performed by: SURGERY

## 2023-06-24 PROCEDURE — 2700000000 HC OXYGEN THERAPY PER DAY

## 2023-06-24 PROCEDURE — 6370000000 HC RX 637 (ALT 250 FOR IP): Performed by: NURSE PRACTITIONER

## 2023-06-24 PROCEDURE — 2580000003 HC RX 258: Performed by: SURGERY

## 2023-06-24 PROCEDURE — 94640 AIRWAY INHALATION TREATMENT: CPT

## 2023-06-24 PROCEDURE — G0378 HOSPITAL OBSERVATION PER HR: HCPCS

## 2023-06-24 PROCEDURE — 2500000003 HC RX 250 WO HCPCS: Performed by: SURGERY

## 2023-06-24 RX ORDER — ALBUTEROL SULFATE 90 UG/1
2 AEROSOL, METERED RESPIRATORY (INHALATION) EVERY 4 HOURS PRN
Status: DISCONTINUED | OUTPATIENT
Start: 2023-06-24 | End: 2023-06-27

## 2023-06-24 RX ORDER — OXYCODONE AND ACETAMINOPHEN 7.5; 325 MG/1; MG/1
1 TABLET ORAL EVERY 4 HOURS PRN
Status: DISCONTINUED | OUTPATIENT
Start: 2023-06-24 | End: 2023-06-29 | Stop reason: HOSPADM

## 2023-06-24 RX ADMIN — SODIUM CHLORIDE, PRESERVATIVE FREE 10 ML: 5 INJECTION INTRAVENOUS at 08:31

## 2023-06-24 RX ADMIN — PANTOPRAZOLE SODIUM 40 MG: 40 TABLET, DELAYED RELEASE ORAL at 05:09

## 2023-06-24 RX ADMIN — HYDROMORPHONE HYDROCHLORIDE 0.5 MG: 1 INJECTION, SOLUTION INTRAMUSCULAR; INTRAVENOUS; SUBCUTANEOUS at 09:45

## 2023-06-24 RX ADMIN — DOXAZOSIN 8 MG: 4 TABLET ORAL at 00:17

## 2023-06-24 RX ADMIN — DEXTROSE AND SODIUM CHLORIDE: 5; 450 INJECTION, SOLUTION INTRAVENOUS at 08:38

## 2023-06-24 RX ADMIN — METOPROLOL TARTRATE 12.5 MG: 25 TABLET, FILM COATED ORAL at 08:31

## 2023-06-24 RX ADMIN — DEXTROSE AND SODIUM CHLORIDE: 5; 450 INJECTION, SOLUTION INTRAVENOUS at 21:39

## 2023-06-24 RX ADMIN — ALBUTEROL SULFATE 2 PUFF: 90 AEROSOL, METERED RESPIRATORY (INHALATION) at 23:01

## 2023-06-24 RX ADMIN — HYDROMORPHONE HYDROCHLORIDE 0.5 MG: 1 INJECTION, SOLUTION INTRAMUSCULAR; INTRAVENOUS; SUBCUTANEOUS at 05:08

## 2023-06-24 RX ADMIN — LISINOPRIL 40 MG: 20 TABLET ORAL at 08:31

## 2023-06-24 RX ADMIN — HYDROMORPHONE HYDROCHLORIDE 0.5 MG: 1 INJECTION, SOLUTION INTRAMUSCULAR; INTRAVENOUS; SUBCUTANEOUS at 14:52

## 2023-06-24 RX ADMIN — OXYCODONE AND ACETAMINOPHEN 1 TABLET: 7.5; 325 TABLET ORAL at 17:45

## 2023-06-24 RX ADMIN — METOPROLOL TARTRATE 12.5 MG: 25 TABLET, FILM COATED ORAL at 20:16

## 2023-06-24 RX ADMIN — DOXAZOSIN 8 MG: 4 TABLET ORAL at 20:14

## 2023-06-24 RX ADMIN — PRAVASTATIN SODIUM 80 MG: 80 TABLET ORAL at 20:16

## 2023-06-24 RX ADMIN — SODIUM CHLORIDE, PRESERVATIVE FREE 10 ML: 5 INJECTION INTRAVENOUS at 20:43

## 2023-06-24 RX ADMIN — HYDROMORPHONE HYDROCHLORIDE 0.5 MG: 1 INJECTION, SOLUTION INTRAMUSCULAR; INTRAVENOUS; SUBCUTANEOUS at 21:34

## 2023-06-24 RX ADMIN — TAMSULOSIN HYDROCHLORIDE 0.4 MG: 0.4 CAPSULE ORAL at 08:31

## 2023-06-24 ASSESSMENT — PAIN - FUNCTIONAL ASSESSMENT
PAIN_FUNCTIONAL_ASSESSMENT: PREVENTS OR INTERFERES SOME ACTIVE ACTIVITIES AND ADLS

## 2023-06-24 ASSESSMENT — PAIN SCALES - WONG BAKER
WONGBAKER_NUMERICALRESPONSE: 0

## 2023-06-24 ASSESSMENT — PAIN SCALES - GENERAL
PAINLEVEL_OUTOF10: 7
PAINLEVEL_OUTOF10: 8
PAINLEVEL_OUTOF10: 6
PAINLEVEL_OUTOF10: 7
PAINLEVEL_OUTOF10: 9
PAINLEVEL_OUTOF10: 9
PAINLEVEL_OUTOF10: 5
PAINLEVEL_OUTOF10: 9
PAINLEVEL_OUTOF10: 9

## 2023-06-24 ASSESSMENT — PAIN DESCRIPTION - ORIENTATION
ORIENTATION: ANTERIOR;MID
ORIENTATION: ANTERIOR;MID
ORIENTATION: ANTERIOR

## 2023-06-24 ASSESSMENT — PAIN DESCRIPTION - LOCATION
LOCATION: ABDOMEN

## 2023-06-24 ASSESSMENT — PAIN DESCRIPTION - PAIN TYPE: TYPE: SURGICAL PAIN

## 2023-06-24 ASSESSMENT — PAIN DESCRIPTION - ONSET: ONSET: ON-GOING

## 2023-06-24 ASSESSMENT — PAIN DESCRIPTION - DESCRIPTORS
DESCRIPTORS: ACHING
DESCRIPTORS: ACHING;DULL
DESCRIPTORS: ACHING

## 2023-06-24 ASSESSMENT — PAIN DESCRIPTION - FREQUENCY: FREQUENCY: CONTINUOUS

## 2023-06-25 ENCOUNTER — APPOINTMENT (OUTPATIENT)
Dept: GENERAL RADIOLOGY | Age: 78
DRG: 354 | End: 2023-06-25
Attending: SURGERY
Payer: MEDICARE

## 2023-06-25 PROBLEM — J98.11 ATELECTASIS: Status: ACTIVE | Noted: 2023-06-25

## 2023-06-25 PROBLEM — R09.02 HYPOXIA: Status: ACTIVE | Noted: 2023-06-25

## 2023-06-25 LAB
ALBUMIN SERPL-MCNC: 3.6 G/DL (ref 3.2–4.6)
ALBUMIN/GLOB SERPL: 0.9 (ref 0.4–1.6)
ALP SERPL-CCNC: 72 U/L (ref 50–136)
ALT SERPL-CCNC: 12 U/L (ref 12–65)
ANION GAP SERPL CALC-SCNC: 3 MMOL/L (ref 2–11)
AST SERPL-CCNC: 16 U/L (ref 15–37)
BASOPHILS # BLD: 0 K/UL (ref 0–0.2)
BASOPHILS NFR BLD: 0 % (ref 0–2)
BILIRUB SERPL-MCNC: 0.6 MG/DL (ref 0.2–1.1)
BUN SERPL-MCNC: 12 MG/DL (ref 8–23)
CALCIUM SERPL-MCNC: 9.1 MG/DL (ref 8.3–10.4)
CHLORIDE SERPL-SCNC: 102 MMOL/L (ref 101–110)
CO2 SERPL-SCNC: 30 MMOL/L (ref 21–32)
CREAT SERPL-MCNC: 0.7 MG/DL (ref 0.8–1.5)
DIFFERENTIAL METHOD BLD: ABNORMAL
EOSINOPHIL # BLD: 0 K/UL (ref 0–0.8)
EOSINOPHIL NFR BLD: 1 % (ref 0.5–7.8)
ERYTHROCYTE [DISTWIDTH] IN BLOOD BY AUTOMATED COUNT: 13.8 % (ref 11.9–14.6)
GLOBULIN SER CALC-MCNC: 3.8 G/DL (ref 2.8–4.5)
GLUCOSE SERPL-MCNC: 133 MG/DL (ref 65–100)
HCT VFR BLD AUTO: 42.5 % (ref 41.1–50.3)
HGB BLD-MCNC: 13.5 G/DL (ref 13.6–17.2)
IMM GRANULOCYTES # BLD AUTO: 0 K/UL (ref 0–0.5)
IMM GRANULOCYTES NFR BLD AUTO: 0 % (ref 0–5)
LYMPHOCYTES # BLD: 0.3 K/UL (ref 0.5–4.6)
LYMPHOCYTES NFR BLD: 7 % (ref 13–44)
MCH RBC QN AUTO: 29.8 PG (ref 26.1–32.9)
MCHC RBC AUTO-ENTMCNC: 31.8 G/DL (ref 31.4–35)
MCV RBC AUTO: 93.8 FL (ref 82–102)
MONOCYTES # BLD: 1 K/UL (ref 0.1–1.3)
MONOCYTES NFR BLD: 20 % (ref 4–12)
NEUTS SEG # BLD: 3.8 K/UL (ref 1.7–8.2)
NEUTS SEG NFR BLD: 72 % (ref 43–78)
NRBC # BLD: 0 K/UL (ref 0–0.2)
PLATELET # BLD AUTO: 144 K/UL (ref 150–450)
PMV BLD AUTO: 8.4 FL (ref 9.4–12.3)
POTASSIUM SERPL-SCNC: 4.2 MMOL/L (ref 3.5–5.1)
PROT SERPL-MCNC: 7.4 G/DL (ref 6.3–8.2)
RBC # BLD AUTO: 4.53 M/UL (ref 4.23–5.6)
SODIUM SERPL-SCNC: 135 MMOL/L (ref 133–143)
WBC # BLD AUTO: 5.2 K/UL (ref 4.3–11.1)

## 2023-06-25 PROCEDURE — 2500000003 HC RX 250 WO HCPCS: Performed by: SURGERY

## 2023-06-25 PROCEDURE — 2580000003 HC RX 258: Performed by: SURGERY

## 2023-06-25 PROCEDURE — G0378 HOSPITAL OBSERVATION PER HR: HCPCS

## 2023-06-25 PROCEDURE — 6360000002 HC RX W HCPCS: Performed by: FAMILY MEDICINE

## 2023-06-25 PROCEDURE — 80053 COMPREHEN METABOLIC PANEL: CPT

## 2023-06-25 PROCEDURE — 6370000000 HC RX 637 (ALT 250 FOR IP): Performed by: NURSE PRACTITIONER

## 2023-06-25 PROCEDURE — 85025 COMPLETE CBC W/AUTO DIFF WBC: CPT

## 2023-06-25 PROCEDURE — 51798 US URINE CAPACITY MEASURE: CPT

## 2023-06-25 PROCEDURE — 36415 COLL VENOUS BLD VENIPUNCTURE: CPT

## 2023-06-25 PROCEDURE — 71045 X-RAY EXAM CHEST 1 VIEW: CPT

## 2023-06-25 RX ORDER — ENOXAPARIN SODIUM 100 MG/ML
40 INJECTION SUBCUTANEOUS DAILY
Status: DISCONTINUED | OUTPATIENT
Start: 2023-06-25 | End: 2023-06-29 | Stop reason: HOSPADM

## 2023-06-25 RX ADMIN — SODIUM CHLORIDE, PRESERVATIVE FREE 10 ML: 5 INJECTION INTRAVENOUS at 09:03

## 2023-06-25 RX ADMIN — PANTOPRAZOLE SODIUM 40 MG: 40 TABLET, DELAYED RELEASE ORAL at 05:01

## 2023-06-25 RX ADMIN — PRAVASTATIN SODIUM 80 MG: 80 TABLET ORAL at 21:03

## 2023-06-25 RX ADMIN — METOPROLOL TARTRATE 12.5 MG: 25 TABLET, FILM COATED ORAL at 09:03

## 2023-06-25 RX ADMIN — TAMSULOSIN HYDROCHLORIDE 0.4 MG: 0.4 CAPSULE ORAL at 09:03

## 2023-06-25 RX ADMIN — DOXAZOSIN 8 MG: 4 TABLET ORAL at 21:04

## 2023-06-25 RX ADMIN — LISINOPRIL 40 MG: 20 TABLET ORAL at 09:02

## 2023-06-25 RX ADMIN — HYDROMORPHONE HYDROCHLORIDE 0.5 MG: 1 INJECTION, SOLUTION INTRAMUSCULAR; INTRAVENOUS; SUBCUTANEOUS at 04:03

## 2023-06-25 RX ADMIN — SODIUM CHLORIDE, PRESERVATIVE FREE 10 ML: 5 INJECTION INTRAVENOUS at 21:04

## 2023-06-25 RX ADMIN — DEXTROSE AND SODIUM CHLORIDE: 5; 450 INJECTION, SOLUTION INTRAVENOUS at 23:41

## 2023-06-25 RX ADMIN — ENOXAPARIN SODIUM 40 MG: 40 INJECTION SUBCUTANEOUS at 16:41

## 2023-06-25 RX ADMIN — OXYCODONE AND ACETAMINOPHEN 1 TABLET: 7.5; 325 TABLET ORAL at 11:02

## 2023-06-25 RX ADMIN — HYDROMORPHONE HYDROCHLORIDE 0.5 MG: 1 INJECTION, SOLUTION INTRAMUSCULAR; INTRAVENOUS; SUBCUTANEOUS at 19:34

## 2023-06-25 RX ADMIN — METOPROLOL TARTRATE 12.5 MG: 25 TABLET, FILM COATED ORAL at 21:04

## 2023-06-25 ASSESSMENT — PAIN SCALES - GENERAL
PAINLEVEL_OUTOF10: 9
PAINLEVEL_OUTOF10: 7
PAINLEVEL_OUTOF10: 6
PAINLEVEL_OUTOF10: 8
PAINLEVEL_OUTOF10: 8
PAINLEVEL_OUTOF10: 6

## 2023-06-25 ASSESSMENT — PAIN DESCRIPTION - LOCATION
LOCATION: ABDOMEN

## 2023-06-25 ASSESSMENT — PAIN DESCRIPTION - ONSET: ONSET: ON-GOING

## 2023-06-25 ASSESSMENT — PAIN SCALES - WONG BAKER
WONGBAKER_NUMERICALRESPONSE: 0

## 2023-06-25 ASSESSMENT — PAIN DESCRIPTION - DESCRIPTORS
DESCRIPTORS: ACHING
DESCRIPTORS: ACHING

## 2023-06-25 ASSESSMENT — PAIN DESCRIPTION - ORIENTATION
ORIENTATION: ANTERIOR
ORIENTATION: ANTERIOR

## 2023-06-25 ASSESSMENT — PAIN - FUNCTIONAL ASSESSMENT
PAIN_FUNCTIONAL_ASSESSMENT: PREVENTS OR INTERFERES SOME ACTIVE ACTIVITIES AND ADLS
PAIN_FUNCTIONAL_ASSESSMENT: PREVENTS OR INTERFERES SOME ACTIVE ACTIVITIES AND ADLS

## 2023-06-25 ASSESSMENT — PAIN DESCRIPTION - PAIN TYPE: TYPE: SURGICAL PAIN

## 2023-06-25 ASSESSMENT — PAIN DESCRIPTION - FREQUENCY: FREQUENCY: CONTINUOUS

## 2023-06-26 PROCEDURE — 6370000000 HC RX 637 (ALT 250 FOR IP): Performed by: NURSE PRACTITIONER

## 2023-06-26 PROCEDURE — 2500000003 HC RX 250 WO HCPCS: Performed by: SURGERY

## 2023-06-26 PROCEDURE — 1100000000 HC RM PRIVATE

## 2023-06-26 PROCEDURE — 6370000000 HC RX 637 (ALT 250 FOR IP)

## 2023-06-26 PROCEDURE — 97530 THERAPEUTIC ACTIVITIES: CPT

## 2023-06-26 PROCEDURE — G0378 HOSPITAL OBSERVATION PER HR: HCPCS

## 2023-06-26 PROCEDURE — 2700000000 HC OXYGEN THERAPY PER DAY

## 2023-06-26 PROCEDURE — 94760 N-INVAS EAR/PLS OXIMETRY 1: CPT

## 2023-06-26 PROCEDURE — 2580000003 HC RX 258: Performed by: SURGERY

## 2023-06-26 PROCEDURE — 6360000002 HC RX W HCPCS: Performed by: FAMILY MEDICINE

## 2023-06-26 PROCEDURE — 97161 PT EVAL LOW COMPLEX 20 MIN: CPT

## 2023-06-26 RX ORDER — METHOCARBAMOL 750 MG/1
750 TABLET, FILM COATED ORAL 3 TIMES DAILY
Status: DISCONTINUED | OUTPATIENT
Start: 2023-06-26 | End: 2023-06-29 | Stop reason: HOSPADM

## 2023-06-26 RX ORDER — SENNA AND DOCUSATE SODIUM 50; 8.6 MG/1; MG/1
2 TABLET, FILM COATED ORAL 2 TIMES DAILY
Status: DISCONTINUED | OUTPATIENT
Start: 2023-06-26 | End: 2023-06-27

## 2023-06-26 RX ORDER — GABAPENTIN 100 MG/1
100 CAPSULE ORAL 3 TIMES DAILY
Status: DISCONTINUED | OUTPATIENT
Start: 2023-06-26 | End: 2023-06-29 | Stop reason: HOSPADM

## 2023-06-26 RX ADMIN — GABAPENTIN 100 MG: 100 CAPSULE ORAL at 15:03

## 2023-06-26 RX ADMIN — METOPROLOL TARTRATE 12.5 MG: 25 TABLET, FILM COATED ORAL at 09:02

## 2023-06-26 RX ADMIN — SENNOSIDES AND DOCUSATE SODIUM 2 TABLET: 8.6; 5 TABLET ORAL at 20:03

## 2023-06-26 RX ADMIN — GABAPENTIN 100 MG: 100 CAPSULE ORAL at 11:55

## 2023-06-26 RX ADMIN — PANTOPRAZOLE SODIUM 40 MG: 40 TABLET, DELAYED RELEASE ORAL at 05:01

## 2023-06-26 RX ADMIN — HYDROMORPHONE HYDROCHLORIDE 0.5 MG: 1 INJECTION, SOLUTION INTRAMUSCULAR; INTRAVENOUS; SUBCUTANEOUS at 09:02

## 2023-06-26 RX ADMIN — METHOCARBAMOL TABLETS 750 MG: 750 TABLET, COATED ORAL at 20:03

## 2023-06-26 RX ADMIN — DOXAZOSIN 8 MG: 4 TABLET ORAL at 20:04

## 2023-06-26 RX ADMIN — TAMSULOSIN HYDROCHLORIDE 0.4 MG: 0.4 CAPSULE ORAL at 09:02

## 2023-06-26 RX ADMIN — HYDROMORPHONE HYDROCHLORIDE 0.5 MG: 1 INJECTION, SOLUTION INTRAMUSCULAR; INTRAVENOUS; SUBCUTANEOUS at 21:29

## 2023-06-26 RX ADMIN — METHOCARBAMOL TABLETS 750 MG: 750 TABLET, COATED ORAL at 15:03

## 2023-06-26 RX ADMIN — SENNOSIDES AND DOCUSATE SODIUM 2 TABLET: 8.6; 5 TABLET ORAL at 11:55

## 2023-06-26 RX ADMIN — GABAPENTIN 100 MG: 100 CAPSULE ORAL at 20:03

## 2023-06-26 RX ADMIN — ENOXAPARIN SODIUM 40 MG: 40 INJECTION SUBCUTANEOUS at 09:01

## 2023-06-26 RX ADMIN — LISINOPRIL 40 MG: 20 TABLET ORAL at 09:01

## 2023-06-26 RX ADMIN — SODIUM CHLORIDE, PRESERVATIVE FREE 10 ML: 5 INJECTION INTRAVENOUS at 20:07

## 2023-06-26 RX ADMIN — PRAVASTATIN SODIUM 80 MG: 80 TABLET ORAL at 20:04

## 2023-06-26 RX ADMIN — METOPROLOL TARTRATE 12.5 MG: 25 TABLET, FILM COATED ORAL at 20:04

## 2023-06-26 RX ADMIN — METHOCARBAMOL TABLETS 750 MG: 750 TABLET, COATED ORAL at 11:55

## 2023-06-26 RX ADMIN — SODIUM CHLORIDE, PRESERVATIVE FREE 10 ML: 5 INJECTION INTRAVENOUS at 09:03

## 2023-06-26 ASSESSMENT — PAIN SCALES - GENERAL
PAINLEVEL_OUTOF10: 1
PAINLEVEL_OUTOF10: 6
PAINLEVEL_OUTOF10: 6

## 2023-06-26 ASSESSMENT — PAIN DESCRIPTION - PAIN TYPE: TYPE: SURGICAL PAIN

## 2023-06-26 ASSESSMENT — PAIN DESCRIPTION - ORIENTATION: ORIENTATION: MID

## 2023-06-26 ASSESSMENT — PAIN - FUNCTIONAL ASSESSMENT: PAIN_FUNCTIONAL_ASSESSMENT: ACTIVITIES ARE NOT PREVENTED

## 2023-06-26 ASSESSMENT — PAIN DESCRIPTION - LOCATION
LOCATION: ABDOMEN

## 2023-06-26 ASSESSMENT — PAIN DESCRIPTION - DESCRIPTORS: DESCRIPTORS: ACHING

## 2023-06-27 PROCEDURE — 6370000000 HC RX 637 (ALT 250 FOR IP): Performed by: NURSE PRACTITIONER

## 2023-06-27 PROCEDURE — 94640 AIRWAY INHALATION TREATMENT: CPT

## 2023-06-27 PROCEDURE — 1100000000 HC RM PRIVATE

## 2023-06-27 PROCEDURE — 6370000000 HC RX 637 (ALT 250 FOR IP)

## 2023-06-27 PROCEDURE — 2580000003 HC RX 258: Performed by: SURGERY

## 2023-06-27 PROCEDURE — 2500000003 HC RX 250 WO HCPCS: Performed by: SURGERY

## 2023-06-27 PROCEDURE — 6360000002 HC RX W HCPCS: Performed by: FAMILY MEDICINE

## 2023-06-27 PROCEDURE — 94761 N-INVAS EAR/PLS OXIMETRY MLT: CPT

## 2023-06-27 PROCEDURE — 97530 THERAPEUTIC ACTIVITIES: CPT

## 2023-06-27 RX ORDER — ALBUTEROL SULFATE 90 UG/1
2 AEROSOL, METERED RESPIRATORY (INHALATION) 4 TIMES DAILY
Status: DISCONTINUED | OUTPATIENT
Start: 2023-06-27 | End: 2023-06-28

## 2023-06-27 RX ORDER — BUDESONIDE 0.5 MG/2ML
0.5 INHALANT ORAL 2 TIMES DAILY
Status: DISCONTINUED | OUTPATIENT
Start: 2023-06-27 | End: 2023-06-27

## 2023-06-27 RX ORDER — ASPIRIN 81 MG/1
81 TABLET ORAL DAILY
Status: DISCONTINUED | OUTPATIENT
Start: 2023-06-27 | End: 2023-06-29 | Stop reason: HOSPADM

## 2023-06-27 RX ORDER — BUDESONIDE 0.5 MG/2ML
0.5 INHALANT ORAL 2 TIMES DAILY
Status: DISCONTINUED | OUTPATIENT
Start: 2023-06-27 | End: 2023-06-29 | Stop reason: HOSPADM

## 2023-06-27 RX ORDER — SENNA AND DOCUSATE SODIUM 50; 8.6 MG/1; MG/1
2 TABLET, FILM COATED ORAL DAILY
Status: DISCONTINUED | OUTPATIENT
Start: 2023-06-28 | End: 2023-06-28

## 2023-06-27 RX ORDER — CLOPIDOGREL BISULFATE 75 MG/1
75 TABLET ORAL DAILY
Status: DISCONTINUED | OUTPATIENT
Start: 2023-06-27 | End: 2023-06-29 | Stop reason: HOSPADM

## 2023-06-27 RX ADMIN — TAMSULOSIN HYDROCHLORIDE 0.4 MG: 0.4 CAPSULE ORAL at 09:29

## 2023-06-27 RX ADMIN — ALBUTEROL SULFATE 2 PUFF: 90 AEROSOL, METERED RESPIRATORY (INHALATION) at 20:31

## 2023-06-27 RX ADMIN — METOPROLOL TARTRATE 12.5 MG: 25 TABLET, FILM COATED ORAL at 21:16

## 2023-06-27 RX ADMIN — ALBUTEROL SULFATE 2 PUFF: 90 AEROSOL, METERED RESPIRATORY (INHALATION) at 15:16

## 2023-06-27 RX ADMIN — SENNOSIDES AND DOCUSATE SODIUM 2 TABLET: 8.6; 5 TABLET ORAL at 09:41

## 2023-06-27 RX ADMIN — ASPIRIN 81 MG: 81 TABLET ORAL at 14:28

## 2023-06-27 RX ADMIN — CLOPIDOGREL BISULFATE 75 MG: 75 TABLET ORAL at 14:28

## 2023-06-27 RX ADMIN — METHOCARBAMOL TABLETS 750 MG: 750 TABLET, COATED ORAL at 14:28

## 2023-06-27 RX ADMIN — PRAVASTATIN SODIUM 80 MG: 80 TABLET ORAL at 21:16

## 2023-06-27 RX ADMIN — BUDESONIDE 500 MCG: 0.5 INHALANT RESPIRATORY (INHALATION) at 20:31

## 2023-06-27 RX ADMIN — METOPROLOL TARTRATE 12.5 MG: 25 TABLET, FILM COATED ORAL at 09:27

## 2023-06-27 RX ADMIN — GABAPENTIN 100 MG: 100 CAPSULE ORAL at 09:26

## 2023-06-27 RX ADMIN — METHOCARBAMOL TABLETS 750 MG: 750 TABLET, COATED ORAL at 21:16

## 2023-06-27 RX ADMIN — GABAPENTIN 100 MG: 100 CAPSULE ORAL at 14:28

## 2023-06-27 RX ADMIN — ENOXAPARIN SODIUM 40 MG: 40 INJECTION SUBCUTANEOUS at 09:26

## 2023-06-27 RX ADMIN — LISINOPRIL 40 MG: 20 TABLET ORAL at 09:26

## 2023-06-27 RX ADMIN — METHOCARBAMOL TABLETS 750 MG: 750 TABLET, COATED ORAL at 09:28

## 2023-06-27 RX ADMIN — PANTOPRAZOLE SODIUM 40 MG: 40 TABLET, DELAYED RELEASE ORAL at 06:01

## 2023-06-27 RX ADMIN — SODIUM CHLORIDE, PRESERVATIVE FREE 10 ML: 5 INJECTION INTRAVENOUS at 09:29

## 2023-06-27 RX ADMIN — SODIUM CHLORIDE, PRESERVATIVE FREE 10 ML: 5 INJECTION INTRAVENOUS at 21:17

## 2023-06-27 RX ADMIN — HYDROMORPHONE HYDROCHLORIDE 0.5 MG: 1 INJECTION, SOLUTION INTRAMUSCULAR; INTRAVENOUS; SUBCUTANEOUS at 12:11

## 2023-06-27 RX ADMIN — GABAPENTIN 100 MG: 100 CAPSULE ORAL at 21:16

## 2023-06-27 RX ADMIN — DOXAZOSIN 8 MG: 4 TABLET ORAL at 21:16

## 2023-06-27 RX ADMIN — OXYCODONE AND ACETAMINOPHEN 1 TABLET: 7.5; 325 TABLET ORAL at 09:40

## 2023-06-27 ASSESSMENT — PAIN DESCRIPTION - DESCRIPTORS
DESCRIPTORS: ACHING;BURNING
DESCRIPTORS: ACHING;STABBING

## 2023-06-27 ASSESSMENT — PAIN SCALES - GENERAL
PAINLEVEL_OUTOF10: 7
PAINLEVEL_OUTOF10: 6
PAINLEVEL_OUTOF10: 6
PAINLEVEL_OUTOF10: 9

## 2023-06-27 ASSESSMENT — PAIN DESCRIPTION - LOCATION
LOCATION: ABDOMEN
LOCATION: ABDOMEN

## 2023-06-27 ASSESSMENT — PAIN DESCRIPTION - ORIENTATION
ORIENTATION: MID
ORIENTATION: MID

## 2023-06-27 ASSESSMENT — PAIN DESCRIPTION - ONSET
ONSET: ON-GOING
ONSET: PROGRESSIVE

## 2023-06-27 ASSESSMENT — PAIN DESCRIPTION - FREQUENCY
FREQUENCY: CONTINUOUS
FREQUENCY: CONTINUOUS

## 2023-06-27 ASSESSMENT — PAIN DESCRIPTION - PAIN TYPE
TYPE: SURGICAL PAIN
TYPE: SURGICAL PAIN

## 2023-06-27 ASSESSMENT — PAIN - FUNCTIONAL ASSESSMENT
PAIN_FUNCTIONAL_ASSESSMENT: PREVENTS OR INTERFERES SOME ACTIVE ACTIVITIES AND ADLS
PAIN_FUNCTIONAL_ASSESSMENT: ACTIVITIES ARE NOT PREVENTED

## 2023-06-28 ENCOUNTER — CLINICAL DOCUMENTATION (OUTPATIENT)
Dept: PULMONOLOGY | Age: 78
End: 2023-06-28

## 2023-06-28 ENCOUNTER — HOME HEALTH ADMISSION (OUTPATIENT)
Dept: HOME HEALTH SERVICES | Facility: HOME HEALTH | Age: 78
End: 2023-06-28
Payer: MEDICARE

## 2023-06-28 PROBLEM — J44.9 COPD (CHRONIC OBSTRUCTIVE PULMONARY DISEASE) (HCC): Status: ACTIVE | Noted: 2023-06-28

## 2023-06-28 PROBLEM — R29.818 SUSPECTED SLEEP APNEA: Status: ACTIVE | Noted: 2023-06-28

## 2023-06-28 PROCEDURE — 6370000000 HC RX 637 (ALT 250 FOR IP)

## 2023-06-28 PROCEDURE — 2700000000 HC OXYGEN THERAPY PER DAY

## 2023-06-28 PROCEDURE — 94761 N-INVAS EAR/PLS OXIMETRY MLT: CPT

## 2023-06-28 PROCEDURE — 6360000002 HC RX W HCPCS: Performed by: FAMILY MEDICINE

## 2023-06-28 PROCEDURE — 1100000000 HC RM PRIVATE

## 2023-06-28 PROCEDURE — 94640 AIRWAY INHALATION TREATMENT: CPT

## 2023-06-28 PROCEDURE — 2580000003 HC RX 258: Performed by: SURGERY

## 2023-06-28 PROCEDURE — 97530 THERAPEUTIC ACTIVITIES: CPT

## 2023-06-28 PROCEDURE — 6370000000 HC RX 637 (ALT 250 FOR IP): Performed by: NURSE PRACTITIONER

## 2023-06-28 PROCEDURE — 99223 1ST HOSP IP/OBS HIGH 75: CPT | Performed by: INTERNAL MEDICINE

## 2023-06-28 RX ORDER — ALBUTEROL SULFATE 90 UG/1
2 AEROSOL, METERED RESPIRATORY (INHALATION) 3 TIMES DAILY
Status: DISCONTINUED | OUTPATIENT
Start: 2023-06-28 | End: 2023-06-29 | Stop reason: HOSPADM

## 2023-06-28 RX ORDER — DOCUSATE SODIUM 100 MG/1
100 CAPSULE, LIQUID FILLED ORAL DAILY
Status: DISCONTINUED | OUTPATIENT
Start: 2023-06-29 | End: 2023-06-29 | Stop reason: HOSPADM

## 2023-06-28 RX ADMIN — BUDESONIDE 500 MCG: 0.5 INHALANT RESPIRATORY (INHALATION) at 19:22

## 2023-06-28 RX ADMIN — ALBUTEROL SULFATE 2 PUFF: 90 AEROSOL, METERED RESPIRATORY (INHALATION) at 07:34

## 2023-06-28 RX ADMIN — ALBUTEROL SULFATE 2 PUFF: 90 AEROSOL, METERED RESPIRATORY (INHALATION) at 19:22

## 2023-06-28 RX ADMIN — METHOCARBAMOL TABLETS 750 MG: 750 TABLET, COATED ORAL at 14:20

## 2023-06-28 RX ADMIN — METHOCARBAMOL TABLETS 750 MG: 750 TABLET, COATED ORAL at 09:58

## 2023-06-28 RX ADMIN — GABAPENTIN 100 MG: 100 CAPSULE ORAL at 23:10

## 2023-06-28 RX ADMIN — SODIUM CHLORIDE, PRESERVATIVE FREE 10 ML: 5 INJECTION INTRAVENOUS at 10:02

## 2023-06-28 RX ADMIN — LISINOPRIL 40 MG: 20 TABLET ORAL at 09:57

## 2023-06-28 RX ADMIN — SODIUM CHLORIDE, PRESERVATIVE FREE 10 ML: 5 INJECTION INTRAVENOUS at 23:13

## 2023-06-28 RX ADMIN — GABAPENTIN 100 MG: 100 CAPSULE ORAL at 14:20

## 2023-06-28 RX ADMIN — METHOCARBAMOL TABLETS 750 MG: 750 TABLET, COATED ORAL at 23:11

## 2023-06-28 RX ADMIN — TAMSULOSIN HYDROCHLORIDE 0.4 MG: 0.4 CAPSULE ORAL at 09:58

## 2023-06-28 RX ADMIN — CLOPIDOGREL BISULFATE 75 MG: 75 TABLET ORAL at 09:58

## 2023-06-28 RX ADMIN — GABAPENTIN 100 MG: 100 CAPSULE ORAL at 09:57

## 2023-06-28 RX ADMIN — METOPROLOL TARTRATE 12.5 MG: 25 TABLET, FILM COATED ORAL at 09:58

## 2023-06-28 RX ADMIN — ENOXAPARIN SODIUM 40 MG: 40 INJECTION SUBCUTANEOUS at 09:56

## 2023-06-28 RX ADMIN — ASPIRIN 81 MG: 81 TABLET ORAL at 09:56

## 2023-06-28 RX ADMIN — ALBUTEROL SULFATE 2 PUFF: 90 AEROSOL, METERED RESPIRATORY (INHALATION) at 11:00

## 2023-06-28 RX ADMIN — BUDESONIDE 500 MCG: 0.5 INHALANT RESPIRATORY (INHALATION) at 07:34

## 2023-06-28 RX ADMIN — OXYCODONE AND ACETAMINOPHEN 1 TABLET: 7.5; 325 TABLET ORAL at 01:38

## 2023-06-28 RX ADMIN — PRAVASTATIN SODIUM 80 MG: 80 TABLET ORAL at 23:12

## 2023-06-28 RX ADMIN — METOPROLOL TARTRATE 12.5 MG: 25 TABLET, FILM COATED ORAL at 23:08

## 2023-06-28 RX ADMIN — SENNOSIDES AND DOCUSATE SODIUM 2 TABLET: 8.6; 5 TABLET ORAL at 09:59

## 2023-06-28 RX ADMIN — DOXAZOSIN 8 MG: 4 TABLET ORAL at 23:10

## 2023-06-28 RX ADMIN — PANTOPRAZOLE SODIUM 40 MG: 40 TABLET, DELAYED RELEASE ORAL at 05:41

## 2023-06-28 ASSESSMENT — PAIN DESCRIPTION - PAIN TYPE: TYPE: SURGICAL PAIN

## 2023-06-28 ASSESSMENT — PAIN DESCRIPTION - DESCRIPTORS: DESCRIPTORS: ACHING

## 2023-06-28 ASSESSMENT — PAIN - FUNCTIONAL ASSESSMENT: PAIN_FUNCTIONAL_ASSESSMENT: ACTIVITIES ARE NOT PREVENTED

## 2023-06-28 ASSESSMENT — PAIN DESCRIPTION - ONSET: ONSET: GRADUAL

## 2023-06-28 ASSESSMENT — PAIN SCALES - GENERAL: PAINLEVEL_OUTOF10: 6

## 2023-06-28 ASSESSMENT — PAIN DESCRIPTION - ORIENTATION: ORIENTATION: MID;LOWER

## 2023-06-28 ASSESSMENT — PAIN DESCRIPTION - LOCATION: LOCATION: ABDOMEN

## 2023-06-28 ASSESSMENT — PAIN DESCRIPTION - FREQUENCY: FREQUENCY: INTERMITTENT

## 2023-06-29 ENCOUNTER — APPOINTMENT (OUTPATIENT)
Dept: GENERAL RADIOLOGY | Age: 78
DRG: 354 | End: 2023-06-29
Attending: SURGERY
Payer: MEDICARE

## 2023-06-29 VITALS
HEIGHT: 69 IN | HEART RATE: 101 BPM | WEIGHT: 202.2 LBS | DIASTOLIC BLOOD PRESSURE: 74 MMHG | OXYGEN SATURATION: 98 % | BODY MASS INDEX: 29.95 KG/M2 | SYSTOLIC BLOOD PRESSURE: 143 MMHG | RESPIRATION RATE: 18 BRPM | TEMPERATURE: 98.4 F

## 2023-06-29 PROCEDURE — 6370000000 HC RX 637 (ALT 250 FOR IP): Performed by: NURSE PRACTITIONER

## 2023-06-29 PROCEDURE — 97530 THERAPEUTIC ACTIVITIES: CPT

## 2023-06-29 PROCEDURE — 2700000000 HC OXYGEN THERAPY PER DAY

## 2023-06-29 PROCEDURE — 6360000002 HC RX W HCPCS: Performed by: FAMILY MEDICINE

## 2023-06-29 PROCEDURE — 6370000000 HC RX 637 (ALT 250 FOR IP)

## 2023-06-29 PROCEDURE — 99232 SBSQ HOSP IP/OBS MODERATE 35: CPT | Performed by: INTERNAL MEDICINE

## 2023-06-29 PROCEDURE — 6370000000 HC RX 637 (ALT 250 FOR IP): Performed by: INTERNAL MEDICINE

## 2023-06-29 PROCEDURE — 94761 N-INVAS EAR/PLS OXIMETRY MLT: CPT

## 2023-06-29 PROCEDURE — 94760 N-INVAS EAR/PLS OXIMETRY 1: CPT

## 2023-06-29 PROCEDURE — 94640 AIRWAY INHALATION TREATMENT: CPT

## 2023-06-29 PROCEDURE — 74018 RADEX ABDOMEN 1 VIEW: CPT

## 2023-06-29 RX ORDER — POLYETHYLENE GLYCOL 3350 17 G/17G
17 POWDER, FOR SOLUTION ORAL DAILY
Status: DISCONTINUED | OUTPATIENT
Start: 2023-06-29 | End: 2023-06-29 | Stop reason: HOSPADM

## 2023-06-29 RX ORDER — METHOCARBAMOL 750 MG/1
750 TABLET, FILM COATED ORAL 3 TIMES DAILY
Qty: 30 TABLET | Refills: 0 | Status: SHIPPED | OUTPATIENT
Start: 2023-06-29 | End: 2023-07-09

## 2023-06-29 RX ORDER — BUDESONIDE 0.5 MG/2ML
0.5 INHALANT ORAL 2 TIMES DAILY
Qty: 120 ML | Refills: 0 | Status: SHIPPED | OUTPATIENT
Start: 2023-06-29 | End: 2023-07-29

## 2023-06-29 RX ORDER — OXYCODONE HYDROCHLORIDE AND ACETAMINOPHEN 5; 325 MG/1; MG/1
1 TABLET ORAL EVERY 6 HOURS PRN
Qty: 20 TABLET | Refills: 0 | Status: SHIPPED | OUTPATIENT
Start: 2023-06-29 | End: 2023-07-04

## 2023-06-29 RX ORDER — ALBUTEROL SULFATE 90 UG/1
2 AEROSOL, METERED RESPIRATORY (INHALATION) EVERY 6 HOURS PRN
Qty: 18 G | Refills: 3 | Status: SHIPPED | OUTPATIENT
Start: 2023-06-29

## 2023-06-29 RX ORDER — ALBUTEROL SULFATE 2.5 MG/3ML
2.5 SOLUTION RESPIRATORY (INHALATION) 4 TIMES DAILY
Qty: 120 EACH | Refills: 5 | Status: SHIPPED | OUTPATIENT
Start: 2023-06-29

## 2023-06-29 RX ORDER — ALBUTEROL SULFATE 90 UG/1
2 AEROSOL, METERED RESPIRATORY (INHALATION) 3 TIMES DAILY
Qty: 18 G | Refills: 3 | Status: SHIPPED | OUTPATIENT
Start: 2023-06-29 | End: 2023-06-29 | Stop reason: SDUPTHER

## 2023-06-29 RX ORDER — GABAPENTIN 100 MG/1
100 CAPSULE ORAL 3 TIMES DAILY
Qty: 30 CAPSULE | Refills: 0 | Status: SHIPPED | OUTPATIENT
Start: 2023-06-29 | End: 2023-07-09

## 2023-06-29 RX ORDER — SENNA AND DOCUSATE SODIUM 50; 8.6 MG/1; MG/1
1 TABLET, FILM COATED ORAL DAILY
Qty: 30 TABLET | Refills: 0 | Status: SHIPPED | OUTPATIENT
Start: 2023-06-29 | End: 2023-07-29

## 2023-06-29 RX ORDER — BUDESONIDE 0.5 MG/2ML
0.5 INHALANT ORAL 2 TIMES DAILY
Qty: 60 EACH | Refills: 5 | Status: SHIPPED | OUTPATIENT
Start: 2023-06-29 | End: 2023-06-29 | Stop reason: SDUPTHER

## 2023-06-29 RX ORDER — ALBUTEROL SULFATE 2.5 MG/3ML
2.5 SOLUTION RESPIRATORY (INHALATION) 4 TIMES DAILY PRN
Qty: 120 EACH | Refills: 5 | Status: SHIPPED | OUTPATIENT
Start: 2023-06-29 | End: 2023-06-29 | Stop reason: HOSPADM

## 2023-06-29 RX ADMIN — ALBUTEROL SULFATE 2 PUFF: 90 AEROSOL, METERED RESPIRATORY (INHALATION) at 13:24

## 2023-06-29 RX ADMIN — ALBUTEROL SULFATE 2 PUFF: 90 AEROSOL, METERED RESPIRATORY (INHALATION) at 07:18

## 2023-06-29 RX ADMIN — DOCUSATE SODIUM 100 MG: 100 CAPSULE, LIQUID FILLED ORAL at 08:05

## 2023-06-29 RX ADMIN — ENOXAPARIN SODIUM 40 MG: 40 INJECTION SUBCUTANEOUS at 08:03

## 2023-06-29 RX ADMIN — METOPROLOL TARTRATE 12.5 MG: 25 TABLET, FILM COATED ORAL at 08:05

## 2023-06-29 RX ADMIN — METHOCARBAMOL TABLETS 750 MG: 750 TABLET, COATED ORAL at 13:28

## 2023-06-29 RX ADMIN — BUDESONIDE 500 MCG: 0.5 INHALANT RESPIRATORY (INHALATION) at 07:18

## 2023-06-29 RX ADMIN — PANTOPRAZOLE SODIUM 40 MG: 40 TABLET, DELAYED RELEASE ORAL at 05:03

## 2023-06-29 RX ADMIN — TAMSULOSIN HYDROCHLORIDE 0.4 MG: 0.4 CAPSULE ORAL at 08:04

## 2023-06-29 RX ADMIN — METHOCARBAMOL TABLETS 750 MG: 750 TABLET, COATED ORAL at 08:04

## 2023-06-29 RX ADMIN — POLYETHYLENE GLYCOL 3350 17 G: 17 POWDER, FOR SOLUTION ORAL at 08:43

## 2023-06-29 RX ADMIN — LISINOPRIL 40 MG: 20 TABLET ORAL at 08:04

## 2023-06-29 RX ADMIN — CLOPIDOGREL BISULFATE 75 MG: 75 TABLET ORAL at 08:04

## 2023-06-29 RX ADMIN — GABAPENTIN 100 MG: 100 CAPSULE ORAL at 08:04

## 2023-06-29 RX ADMIN — ASPIRIN 81 MG: 81 TABLET ORAL at 08:04

## 2023-06-29 RX ADMIN — GABAPENTIN 100 MG: 100 CAPSULE ORAL at 13:28

## 2023-06-29 ASSESSMENT — PAIN SCALES - GENERAL: PAINLEVEL_OUTOF10: 0

## 2023-06-30 ENCOUNTER — CARE COORDINATION (OUTPATIENT)
Dept: CARE COORDINATION | Facility: CLINIC | Age: 78
End: 2023-06-30

## 2023-07-01 ENCOUNTER — HOME CARE VISIT (OUTPATIENT)
Dept: SCHEDULING | Facility: HOME HEALTH | Age: 78
End: 2023-07-01

## 2023-07-01 VITALS
RESPIRATION RATE: 24 BRPM | DIASTOLIC BLOOD PRESSURE: 80 MMHG | SYSTOLIC BLOOD PRESSURE: 134 MMHG | HEART RATE: 67 BPM | TEMPERATURE: 98.1 F | OXYGEN SATURATION: 98 %

## 2023-07-01 PROCEDURE — 0221000100 HH NO PAY CLAIM PROCEDURE

## 2023-07-01 PROCEDURE — G0299 HHS/HOSPICE OF RN EA 15 MIN: HCPCS

## 2023-07-01 ASSESSMENT — ENCOUNTER SYMPTOMS
STOOL DESCRIPTION: SOFT FORMED
HEMOPTYSIS: 0
SKIN LESIONS: 1
DYSPNEA ACTIVITY LEVEL: AFTER AMBULATING 10 - 20 FT

## 2023-07-03 ENCOUNTER — HOME CARE VISIT (OUTPATIENT)
Dept: SCHEDULING | Facility: HOME HEALTH | Age: 78
End: 2023-07-03

## 2023-07-03 ENCOUNTER — TELEMEDICINE (OUTPATIENT)
Dept: FAMILY MEDICINE CLINIC | Facility: CLINIC | Age: 78
End: 2023-07-03
Payer: MEDICARE

## 2023-07-03 ENCOUNTER — HOSPITAL ENCOUNTER (OUTPATIENT)
Dept: LAB | Age: 78
Discharge: HOME | End: 2023-07-06
Payer: MEDICARE

## 2023-07-03 VITALS
TEMPERATURE: 97.9 F | HEART RATE: 78 BPM | DIASTOLIC BLOOD PRESSURE: 80 MMHG | RESPIRATION RATE: 18 BRPM | OXYGEN SATURATION: 98 % | SYSTOLIC BLOOD PRESSURE: 138 MMHG

## 2023-07-03 VITALS
TEMPERATURE: 97.8 F | RESPIRATION RATE: 16 BRPM | OXYGEN SATURATION: 95 % | DIASTOLIC BLOOD PRESSURE: 78 MMHG | SYSTOLIC BLOOD PRESSURE: 116 MMHG | HEART RATE: 92 BPM

## 2023-07-03 DIAGNOSIS — N39.0 URINARY TRACT INFECTION WITHOUT HEMATURIA, SITE UNSPECIFIED: ICD-10-CM

## 2023-07-03 DIAGNOSIS — R82.90 CLOUDY URINE: Primary | ICD-10-CM

## 2023-07-03 LAB
APPEARANCE UR: ABNORMAL
BACTERIA URNS QL MICRO: ABNORMAL /HPF
BILIRUB UR QL: NEGATIVE
COLOR UR: ABNORMAL
EPI CELLS #/AREA URNS HPF: ABNORMAL /HPF
GLUCOSE UR STRIP.AUTO-MCNC: NEGATIVE MG/DL
HGB UR QL STRIP: ABNORMAL
KETONES UR QL STRIP.AUTO: ABNORMAL MG/DL
LEUKOCYTE ESTERASE UR QL STRIP.AUTO: ABNORMAL
MUCOUS THREADS URNS QL MICRO: ABNORMAL /LPF
NITRITE UR QL STRIP.AUTO: POSITIVE
OTHER OBSERVATIONS: ABNORMAL
PH UR STRIP: 7 (ref 5–9)
PROT UR STRIP-MCNC: ABNORMAL MG/DL
RBC #/AREA URNS HPF: ABNORMAL /HPF
SP GR UR REFRACTOMETRY: 1.02 (ref 1–1.02)
UROBILINOGEN UR QL STRIP.AUTO: 1 EU/DL (ref 0.2–1)
WBC URNS QL MICRO: >100 /HPF
YEAST URNS QL MICRO: ABNORMAL

## 2023-07-03 PROCEDURE — 87086 URINE CULTURE/COLONY COUNT: CPT

## 2023-07-03 PROCEDURE — 87088 URINE BACTERIA CULTURE: CPT

## 2023-07-03 PROCEDURE — G0151 HHCP-SERV OF PT,EA 15 MIN: HCPCS

## 2023-07-03 PROCEDURE — 99443 PR PHYS/QHP TELEPHONE EVALUATION 21-30 MIN: CPT | Performed by: FAMILY MEDICINE

## 2023-07-03 PROCEDURE — 87186 SC STD MICRODIL/AGAR DIL: CPT

## 2023-07-03 PROCEDURE — G0299 HHS/HOSPICE OF RN EA 15 MIN: HCPCS

## 2023-07-03 PROCEDURE — 81001 URINALYSIS AUTO W/SCOPE: CPT

## 2023-07-03 RX ORDER — SULFAMETHOXAZOLE AND TRIMETHOPRIM 800; 160 MG/1; MG/1
1 TABLET ORAL 2 TIMES DAILY
Qty: 14 TABLET | Refills: 0 | Status: SHIPPED | OUTPATIENT
Start: 2023-07-03 | End: 2023-07-10

## 2023-07-03 SDOH — ECONOMIC STABILITY: FOOD INSECURITY: WITHIN THE PAST 12 MONTHS, THE FOOD YOU BOUGHT JUST DIDN'T LAST AND YOU DIDN'T HAVE MONEY TO GET MORE.: NEVER TRUE

## 2023-07-03 SDOH — ECONOMIC STABILITY: HOUSING INSECURITY
IN THE LAST 12 MONTHS, WAS THERE A TIME WHEN YOU DID NOT HAVE A STEADY PLACE TO SLEEP OR SLEPT IN A SHELTER (INCLUDING NOW)?: NO

## 2023-07-03 SDOH — ECONOMIC STABILITY: INCOME INSECURITY: HOW HARD IS IT FOR YOU TO PAY FOR THE VERY BASICS LIKE FOOD, HOUSING, MEDICAL CARE, AND HEATING?: NOT HARD AT ALL

## 2023-07-03 SDOH — ECONOMIC STABILITY: FOOD INSECURITY: WITHIN THE PAST 12 MONTHS, YOU WORRIED THAT YOUR FOOD WOULD RUN OUT BEFORE YOU GOT MONEY TO BUY MORE.: NEVER TRUE

## 2023-07-03 ASSESSMENT — PATIENT HEALTH QUESTIONNAIRE - PHQ9
SUM OF ALL RESPONSES TO PHQ QUESTIONS 1-9: 0
SUM OF ALL RESPONSES TO PHQ9 QUESTIONS 1 & 2: 0
SUM OF ALL RESPONSES TO PHQ QUESTIONS 1-9: 0
1. LITTLE INTEREST OR PLEASURE IN DOING THINGS: 0
2. FEELING DOWN, DEPRESSED OR HOPELESS: 0

## 2023-07-03 ASSESSMENT — ENCOUNTER SYMPTOMS
DYSPNEA ACTIVITY LEVEL: AFTER AMBULATING LESS THAN 10 FT
CONSTIPATION: 1
DYSPNEA ACTIVITY LEVEL: AFTER AMBULATING 10 - 20 FT

## 2023-07-03 NOTE — PROGRESS NOTES
2/21/2015    Poor historian     Postoperative anemia due to acute blood loss 2/19/2015    S/P CABG x 2 2/2015    Thrombocytopenia due to extra corporeal by-pass circulation 2/19/2015    Patient denies    Varicose veins of lower extremities with other complications 8/14/1628    7/24/15 (Dr Deidra Navarro) L GSV RFA ablation 5/15/14 (Dr. Deidra Navarro) R GSV Radiofrequency ablation     Wears dentures      Past Surgical History:   Procedure Laterality Date    CABG, ARTERY-VEIN, TWO  2015    Dr. Andrea Nicolas  2/2015 and 8/15    CARDIAC CATHETERIZATION  8/2015     [id number]    COLONOSCOPY N/A 10/22/2021    COLONOSCOPY/BMI 30 performed by Deangelo Porter MD at [facility] ENDOSCOPY    COLONOSCOPY N/A 3/9/2018    COLONOSCOPY/ 28 performed by Deangelo Porter MD at [facility] ENDOSCOPY    COLONOSCOPY  2012    COLSC FLX W/REMOVAL LESION BY HOT BX FORCEPS  10/22/2021         COLSC FLX W/REMOVAL LESION BY HOT BX FORCEPS  3/9/2018         CORONARY ANGIOPLASTY WITH STENT PLACEMENT  01/29/2021    Two 3.0 x 12 [place] drug-eluting stents    CORONARY ARTERY BYPASS GRAFT  2/15    DR. Pimentel    EGD TRANSORAL BIOPSY SINGLE/MULTIPLE  10/22/2021         FRACTURE SURGERY Right 2007    elbow fx    HERNIA REPAIR  7539    umbilical    HERNIA REPAIR Left unsure of 2nd surgery    LIH and redo embilical, Both Repairs at same time    HERNIA REPAIR N/A 6/23/2023    OPEN INCISIONAL HERNIA REPAIR WITH MESH performed by Kaylin Villagomez MD at [facility] MAIN OR    KNEE ARTHROSCOPY Left 2006    left knee    LEFT HEART CATH,PERCUTANEOUS  01/29/2021    angioplasty and stenting of the two high-grade lesions in the circumflex    ORTHOPEDIC SURGERY Right 2007    right elbow fx    VEIN SURGERY       Family History   Problem Relation Age of Onset    Heart Disease Father     Hypertension Brother     No Known Problems Mother     Heart Disease Brother     Hypertension Brother     Hypertension Sister      Social History     Tobacco Use    Smoking status:

## 2023-07-03 NOTE — CASE COMMUNICATION
7.3.23 [address] [facility] Dr. Jada Hampton office notified [facility] with C&S dropped to lab, results will be faxed to office. Dr. Jada Hampton out of office so staff will send message to MD on call. Office provided with SN information if any questions.

## 2023-07-05 LAB
BACTERIA SPEC CULT: ABNORMAL
SERVICE CMNT-IMP: ABNORMAL

## 2023-07-06 ENCOUNTER — HOME CARE VISIT (OUTPATIENT)
Dept: SCHEDULING | Facility: HOME HEALTH | Age: 78
End: 2023-07-06

## 2023-07-06 PROCEDURE — G0299 HHS/HOSPICE OF RN EA 15 MIN: HCPCS

## 2023-07-07 VITALS
DIASTOLIC BLOOD PRESSURE: 63 MMHG | RESPIRATION RATE: 18 BRPM | HEART RATE: 91 BPM | TEMPERATURE: 97.9 F | OXYGEN SATURATION: 97 % | SYSTOLIC BLOOD PRESSURE: 112 MMHG

## 2023-07-07 ASSESSMENT — ENCOUNTER SYMPTOMS: PAIN LOCATION - PAIN QUALITY: SORE

## 2023-07-08 ENCOUNTER — HOME CARE VISIT (OUTPATIENT)
Dept: SCHEDULING | Facility: HOME HEALTH | Age: 78
End: 2023-07-08

## 2023-07-08 VITALS
OXYGEN SATURATION: 95 % | SYSTOLIC BLOOD PRESSURE: 110 MMHG | RESPIRATION RATE: 16 BRPM | TEMPERATURE: 98.5 F | DIASTOLIC BLOOD PRESSURE: 70 MMHG | HEART RATE: 90 BPM

## 2023-07-08 PROCEDURE — G0152 HHCP-SERV OF OT,EA 15 MIN: HCPCS

## 2023-07-08 ASSESSMENT — ENCOUNTER SYMPTOMS: CONSTIPATION: 1

## 2023-07-10 ENCOUNTER — HOME CARE VISIT (OUTPATIENT)
Dept: SCHEDULING | Facility: HOME HEALTH | Age: 78
End: 2023-07-10
Payer: MEDICARE

## 2023-07-10 ENCOUNTER — CARE COORDINATION (OUTPATIENT)
Dept: CARE COORDINATION | Facility: CLINIC | Age: 78
End: 2023-07-10

## 2023-07-10 VITALS
DIASTOLIC BLOOD PRESSURE: 70 MMHG | HEART RATE: 62 BPM | RESPIRATION RATE: 16 BRPM | SYSTOLIC BLOOD PRESSURE: 115 MMHG | OXYGEN SATURATION: 98 % | TEMPERATURE: 98.2 F

## 2023-07-10 PROCEDURE — G0157 HHC PT ASSISTANT EA 15: HCPCS

## 2023-07-10 PROCEDURE — G0299 HHS/HOSPICE OF RN EA 15 MIN: HCPCS

## 2023-07-10 NOTE — CARE COORDINATION
[facility] Care Transitions Follow Up Call    Patient Current Location:  Home: [address] [place] [address]    Care Transition Nurse contacted the patient by telephone to follow up after admission on 2023. Verified name and  with patient as identifiers. Patient: Mulu Sewell Sr.  Patient : 1945   MRN: [id number]  Reason for Admission: Recurrent incisional hernia  Discharge Date: 23 RARS: Readmission Risk Score: 10.1      Needs to be reviewed by the provider   Additional needs identified to be addressed with provider: No  none             Method of communication with provider: none. Patient reports that he is feeling ok. Reports that he is following discharge instructions. Home health therapy is in place. He does not have any concerns/questions. Addressed changes since last contact:  none  Discussed follow-up appointments. If no appointment was previously scheduled, appointment scheduling offered: Yes. Is follow up appointment scheduled within 7 days of discharge? Yes.     Follow Up  Future Appointments   Date Time Provider [address] [place] [address]   2023  4:00 AM Alena Domínguez [address]   2023  9:45 AM Jose Castillo MD CSA [facility] AMB   2023 To Be Determined Annette Lise, PTA [address]   2023 To Be Determined Leticia Geller, RN [address]   2023 To Be Determined Conner Anguillan [address]   2023 To Be Determined Annette Lise, PTA [address]   2023 To Be Determined Allegan Anguillan [address]   2023 To Be Determined Leticia Geller, RN [address]   2023 To Be Determined Annette Lise, PTA [address]   2023 To Be Determined Conner Anguillan [address]   2023 To Be Determined Sadia Barrientos, PT [address]   2023 To Be Determined Conner Anguillan [address]   2023 To Be Determined

## 2023-07-11 ENCOUNTER — HOME CARE VISIT (OUTPATIENT)
Dept: HOME HEALTH SERVICES | Facility: HOME HEALTH | Age: 78
End: 2023-07-11
Payer: MEDICARE

## 2023-07-11 ENCOUNTER — HOME CARE VISIT (OUTPATIENT)
Dept: SCHEDULING | Facility: HOME HEALTH | Age: 78
End: 2023-07-11
Payer: MEDICARE

## 2023-07-11 ENCOUNTER — OFFICE VISIT (OUTPATIENT)
Dept: SURGERY | Age: 78
End: 2023-07-11

## 2023-07-11 VITALS
OXYGEN SATURATION: 99 % | HEART RATE: 85 BPM | RESPIRATION RATE: 16 BRPM | TEMPERATURE: 97.3 F | SYSTOLIC BLOOD PRESSURE: 120 MMHG | DIASTOLIC BLOOD PRESSURE: 70 MMHG

## 2023-07-11 VITALS
TEMPERATURE: 98.1 F | DIASTOLIC BLOOD PRESSURE: 68 MMHG | SYSTOLIC BLOOD PRESSURE: 112 MMHG | HEART RATE: 90 BPM | OXYGEN SATURATION: 96 % | RESPIRATION RATE: 16 BRPM

## 2023-07-11 VITALS — HEIGHT: 69 IN | BODY MASS INDEX: 29.92 KG/M2 | WEIGHT: 202 LBS

## 2023-07-11 DIAGNOSIS — Z48.89 POSTOPERATIVE VISIT: ICD-10-CM

## 2023-07-11 PROCEDURE — G0158 HHC OT ASSISTANT EA 15: HCPCS

## 2023-07-11 PROCEDURE — 99024 POSTOP FOLLOW-UP VISIT: CPT | Performed by: SURGERY

## 2023-07-11 ASSESSMENT — ENCOUNTER SYMPTOMS: PAIN LOCATION - PAIN QUALITY: DISCOMFORT

## 2023-07-12 ENCOUNTER — HOME CARE VISIT (OUTPATIENT)
Dept: SCHEDULING | Facility: HOME HEALTH | Age: 78
End: 2023-07-12
Payer: MEDICARE

## 2023-07-12 VITALS
TEMPERATURE: 97.2 F | RESPIRATION RATE: 16 BRPM | SYSTOLIC BLOOD PRESSURE: 120 MMHG | HEART RATE: 60 BPM | DIASTOLIC BLOOD PRESSURE: 78 MMHG | OXYGEN SATURATION: 98 %

## 2023-07-12 PROCEDURE — G0157 HHC PT ASSISTANT EA 15: HCPCS

## 2023-07-13 ENCOUNTER — HOME CARE VISIT (OUTPATIENT)
Dept: SCHEDULING | Facility: HOME HEALTH | Age: 78
End: 2023-07-13
Payer: MEDICARE

## 2023-07-13 ENCOUNTER — HOSPITAL ENCOUNTER (OUTPATIENT)
Dept: LAB | Age: 78
Setting detail: SPECIMEN
Discharge: HOME | End: 2023-07-13
Payer: MEDICARE

## 2023-07-13 VITALS
OXYGEN SATURATION: 98 % | SYSTOLIC BLOOD PRESSURE: 128 MMHG | DIASTOLIC BLOOD PRESSURE: 78 MMHG | HEART RATE: 67 BPM | RESPIRATION RATE: 16 BRPM | TEMPERATURE: 98.7 F

## 2023-07-13 DIAGNOSIS — N39.0 URINARY TRACT INFECTION WITHOUT HEMATURIA, SITE UNSPECIFIED: Primary | ICD-10-CM

## 2023-07-13 LAB
APPEARANCE UR: CLEAR
BACTERIA URNS QL MICRO: NEGATIVE /HPF
BILIRUB UR QL: NEGATIVE
CASTS URNS QL MICRO: ABNORMAL /LPF
COLOR UR: ABNORMAL
EPI CELLS #/AREA URNS HPF: ABNORMAL /HPF
GLUCOSE UR STRIP.AUTO-MCNC: NEGATIVE MG/DL
HGB UR QL STRIP: ABNORMAL
KETONES UR QL STRIP.AUTO: NEGATIVE MG/DL
LEUKOCYTE ESTERASE UR QL STRIP.AUTO: NEGATIVE
NITRITE UR QL STRIP.AUTO: NEGATIVE
PH UR STRIP: 5.5 (ref 5–9)
PROT UR STRIP-MCNC: NEGATIVE MG/DL
RBC #/AREA URNS HPF: ABNORMAL /HPF
SP GR UR REFRACTOMETRY: 1.01 (ref 1–1.02)
UROBILINOGEN UR QL STRIP.AUTO: 0.2 EU/DL (ref 0.2–1)
WBC URNS QL MICRO: ABNORMAL /HPF

## 2023-07-13 PROCEDURE — G0299 HHS/HOSPICE OF RN EA 15 MIN: HCPCS

## 2023-07-13 PROCEDURE — 81001 URINALYSIS AUTO W/SCOPE: CPT

## 2023-07-13 PROCEDURE — 87086 URINE CULTURE/COLONY COUNT: CPT

## 2023-07-13 ASSESSMENT — ENCOUNTER SYMPTOMS: DYSPNEA ACTIVITY LEVEL: AFTER AMBULATING MORE THAN 20 FT

## 2023-07-14 ENCOUNTER — HOME CARE VISIT (OUTPATIENT)
Dept: SCHEDULING | Facility: HOME HEALTH | Age: 78
End: 2023-07-14
Payer: MEDICARE

## 2023-07-14 PROCEDURE — G0158 HHC OT ASSISTANT EA 15: HCPCS

## 2023-07-15 LAB
BACTERIA SPEC CULT: NORMAL
SERVICE CMNT-IMP: NORMAL

## 2023-07-16 VITALS
HEART RATE: 89 BPM | RESPIRATION RATE: 16 BRPM | OXYGEN SATURATION: 99 % | SYSTOLIC BLOOD PRESSURE: 130 MMHG | TEMPERATURE: 98.1 F | DIASTOLIC BLOOD PRESSURE: 80 MMHG

## 2023-07-17 ENCOUNTER — HOME CARE VISIT (OUTPATIENT)
Dept: SCHEDULING | Facility: HOME HEALTH | Age: 78
End: 2023-07-17
Payer: MEDICARE

## 2023-07-17 PROCEDURE — G0155 HHCP-SVS OF CSW,EA 15 MIN: HCPCS

## 2023-07-18 ENCOUNTER — HOME CARE VISIT (OUTPATIENT)
Dept: SCHEDULING | Facility: HOME HEALTH | Age: 78
End: 2023-07-18
Payer: MEDICARE

## 2023-07-18 VITALS
HEART RATE: 70 BPM | SYSTOLIC BLOOD PRESSURE: 110 MMHG | RESPIRATION RATE: 16 BRPM | OXYGEN SATURATION: 98 % | DIASTOLIC BLOOD PRESSURE: 64 MMHG | TEMPERATURE: 97.8 F

## 2023-07-18 PROCEDURE — G0158 HHC OT ASSISTANT EA 15: HCPCS

## 2023-07-18 PROCEDURE — G0157 HHC PT ASSISTANT EA 15: HCPCS

## 2023-07-19 ENCOUNTER — HOME CARE VISIT (OUTPATIENT)
Dept: SCHEDULING | Facility: HOME HEALTH | Age: 78
End: 2023-07-19
Payer: MEDICARE

## 2023-07-19 VITALS
RESPIRATION RATE: 16 BRPM | SYSTOLIC BLOOD PRESSURE: 122 MMHG | HEART RATE: 60 BPM | OXYGEN SATURATION: 98 % | TEMPERATURE: 97.2 F | DIASTOLIC BLOOD PRESSURE: 60 MMHG

## 2023-07-19 VITALS
DIASTOLIC BLOOD PRESSURE: 64 MMHG | HEART RATE: 67 BPM | SYSTOLIC BLOOD PRESSURE: 124 MMHG | OXYGEN SATURATION: 98 % | RESPIRATION RATE: 16 BRPM | TEMPERATURE: 98.4 F

## 2023-07-19 PROCEDURE — G0299 HHS/HOSPICE OF RN EA 15 MIN: HCPCS

## 2023-07-21 ENCOUNTER — HOME CARE VISIT (OUTPATIENT)
Dept: SCHEDULING | Facility: HOME HEALTH | Age: 78
End: 2023-07-21
Payer: MEDICARE

## 2023-07-21 VITALS
DIASTOLIC BLOOD PRESSURE: 70 MMHG | HEART RATE: 76 BPM | RESPIRATION RATE: 16 BRPM | SYSTOLIC BLOOD PRESSURE: 120 MMHG | TEMPERATURE: 98.1 F | OXYGEN SATURATION: 98 %

## 2023-07-21 VITALS
TEMPERATURE: 98 F | OXYGEN SATURATION: 98 % | HEART RATE: 60 BPM | SYSTOLIC BLOOD PRESSURE: 118 MMHG | RESPIRATION RATE: 18 BRPM | DIASTOLIC BLOOD PRESSURE: 68 MMHG

## 2023-07-21 PROCEDURE — G0158 HHC OT ASSISTANT EA 15: HCPCS

## 2023-07-21 PROCEDURE — G0157 HHC PT ASSISTANT EA 15: HCPCS

## 2023-07-21 ASSESSMENT — ENCOUNTER SYMPTOMS: PAIN LOCATION - PAIN QUALITY: PINCH

## 2023-07-23 DIAGNOSIS — K21.9 GASTRO-ESOPHAGEAL REFLUX DISEASE WITHOUT ESOPHAGITIS: ICD-10-CM

## 2023-07-25 ENCOUNTER — HOME CARE VISIT (OUTPATIENT)
Dept: SCHEDULING | Facility: HOME HEALTH | Age: 78
End: 2023-07-25
Payer: MEDICARE

## 2023-07-25 VITALS
DIASTOLIC BLOOD PRESSURE: 61 MMHG | SYSTOLIC BLOOD PRESSURE: 125 MMHG | OXYGEN SATURATION: 97 % | RESPIRATION RATE: 16 BRPM | HEART RATE: 63 BPM | TEMPERATURE: 98.5 F

## 2023-07-25 VITALS
OXYGEN SATURATION: 98 % | SYSTOLIC BLOOD PRESSURE: 118 MMHG | DIASTOLIC BLOOD PRESSURE: 68 MMHG | RESPIRATION RATE: 16 BRPM | HEART RATE: 71 BPM | TEMPERATURE: 98.3 F

## 2023-07-25 PROCEDURE — G0158 HHC OT ASSISTANT EA 15: HCPCS

## 2023-07-25 PROCEDURE — G0151 HHCP-SERV OF PT,EA 15 MIN: HCPCS

## 2023-07-26 RX ORDER — OMEPRAZOLE 20 MG/1
20 CAPSULE, DELAYED RELEASE ORAL DAILY
Qty: 30 CAPSULE | Refills: 0 | Status: SHIPPED | OUTPATIENT
Start: 2023-07-26

## 2023-07-27 ENCOUNTER — HOME CARE VISIT (OUTPATIENT)
Dept: SCHEDULING | Facility: HOME HEALTH | Age: 78
End: 2023-07-27
Payer: MEDICARE

## 2023-07-27 VITALS
TEMPERATURE: 98.2 F | RESPIRATION RATE: 16 BRPM | SYSTOLIC BLOOD PRESSURE: 128 MMHG | DIASTOLIC BLOOD PRESSURE: 72 MMHG | OXYGEN SATURATION: 98 % | HEART RATE: 69 BPM

## 2023-07-27 VITALS
OXYGEN SATURATION: 95 % | HEART RATE: 61 BPM | TEMPERATURE: 97.9 F | RESPIRATION RATE: 16 BRPM | SYSTOLIC BLOOD PRESSURE: 118 MMHG | DIASTOLIC BLOOD PRESSURE: 74 MMHG

## 2023-07-27 PROCEDURE — G0299 HHS/HOSPICE OF RN EA 15 MIN: HCPCS

## 2023-07-27 PROCEDURE — G0152 HHCP-SERV OF OT,EA 15 MIN: HCPCS

## 2023-07-27 ASSESSMENT — ENCOUNTER SYMPTOMS: DYSPNEA ACTIVITY LEVEL: AFTER AMBULATING MORE THAN 20 FT

## 2023-08-03 ENCOUNTER — CARE COORDINATION (OUTPATIENT)
Dept: CARE COORDINATION | Facility: CLINIC | Age: 78
End: 2023-08-03

## 2023-08-03 NOTE — CARE COORDINATION
Patient has graduated from the Care Transitions program on 8/3/2023. Patient/family has the ability to self-manage at this time. Patient has no further care management needs, no referral to the [facility] team for further management. Patient has Care Transition Nurse's contact information for any further questions, concerns, or needs. Patients upcoming visits:    Future Appointments   Date Time Provider [address]   9/1/2023  1:40 PM Darrel Gates DO PST [facility] AMB   10/5/2023  9:15 AM [id number] BLOOD DRAW [id number] [facility] AMB   10/12/2023  9:15 AM Rama Olmedo MD [id number] [facility] AMB           Patient reports that he has completed home health therapies and discharged. No concerns or questions at this time.      Declined RPM.

## 2023-09-29 ENCOUNTER — TELEPHONE (OUTPATIENT)
Dept: FAMILY MEDICINE CLINIC | Facility: CLINIC | Age: 78
End: 2023-09-29

## 2023-10-05 ENCOUNTER — NURSE ONLY (OUTPATIENT)
Dept: UROLOGY | Age: 78
End: 2023-10-05

## 2023-10-05 DIAGNOSIS — C61 MALIGNANT NEOPLASM OF PROSTATE (HCC): ICD-10-CM

## 2023-10-08 LAB — PSA SERPL-MCNC: 0.2 NG/ML

## 2023-10-10 ENCOUNTER — HOSPITAL ENCOUNTER (OUTPATIENT)
Dept: PULMONOLOGY | Age: 78
Discharge: HOME | End: 2023-10-13
Payer: MEDICARE

## 2023-10-10 PROCEDURE — 94726 PLETHYSMOGRAPHY LUNG VOLUMES: CPT

## 2023-10-10 PROCEDURE — 94729 DIFFUSING CAPACITY: CPT

## 2023-10-10 PROCEDURE — 94060 EVALUATION OF WHEEZING: CPT

## 2023-10-12 ENCOUNTER — OFFICE VISIT (OUTPATIENT)
Dept: UROLOGY | Age: 78
End: 2023-10-12
Payer: MEDICARE

## 2023-10-12 DIAGNOSIS — N13.8 BENIGN PROSTATIC HYPERPLASIA WITH URINARY OBSTRUCTION: ICD-10-CM

## 2023-10-12 DIAGNOSIS — C61 MALIGNANT NEOPLASM OF PROSTATE (HCC): Primary | ICD-10-CM

## 2023-10-12 DIAGNOSIS — N40.1 BENIGN PROSTATIC HYPERPLASIA WITH URINARY OBSTRUCTION: ICD-10-CM

## 2023-10-12 LAB
BILIRUBIN, URINE, POC: NEGATIVE
BLOOD URINE, POC: NORMAL
GLUCOSE URINE, POC: NEGATIVE
KETONES, URINE, POC: NEGATIVE
LEUKOCYTE ESTERASE, URINE, POC: NEGATIVE
NITRITE, URINE, POC: NEGATIVE
PH, URINE, POC: 7 (ref 4.6–8)
PROTEIN,URINE, POC: NEGATIVE
SPECIFIC GRAVITY, URINE, POC: 1.02 (ref 1–1.03)
URINALYSIS CLARITY, POC: NORMAL
URINALYSIS COLOR, POC: NORMAL
UROBILINOGEN, POC: NORMAL

## 2023-10-12 PROCEDURE — G8427 DOCREV CUR MEDS BY ELIG CLIN: HCPCS | Performed by: UROLOGY

## 2023-10-12 PROCEDURE — G8484 FLU IMMUNIZE NO ADMIN: HCPCS | Performed by: UROLOGY

## 2023-10-12 PROCEDURE — 1123F ACP DISCUSS/DSCN MKR DOCD: CPT | Performed by: UROLOGY

## 2023-10-12 PROCEDURE — 81003 URINALYSIS AUTO W/O SCOPE: CPT | Performed by: UROLOGY

## 2023-10-12 PROCEDURE — G8417 CALC BMI ABV UP PARAM F/U: HCPCS | Performed by: UROLOGY

## 2023-10-12 PROCEDURE — 99214 OFFICE O/P EST MOD 30 MIN: CPT | Performed by: UROLOGY

## 2023-10-12 PROCEDURE — 1036F TOBACCO NON-USER: CPT | Performed by: UROLOGY

## 2023-10-12 RX ORDER — TAMSULOSIN HYDROCHLORIDE 0.4 MG/1
0.4 CAPSULE ORAL 2 TIMES DAILY
Qty: 180 CAPSULE | Refills: 3 | Status: SHIPPED | OUTPATIENT
Start: 2023-10-12

## 2023-10-12 RX ORDER — TAMSULOSIN HYDROCHLORIDE 0.4 MG/1
0.4 CAPSULE ORAL EVERY EVENING
Qty: 90 CAPSULE | Refills: 3 | Status: SHIPPED | OUTPATIENT
Start: 2023-10-12 | End: 2023-10-12 | Stop reason: SDUPTHER

## 2023-10-12 ASSESSMENT — ENCOUNTER SYMPTOMS: BACK PAIN: 0

## 2023-10-12 NOTE — PROGRESS NOTES
Protein, Urine, POC Negative Negative    Urobilinogen, POC 0.2 mg/dL     Nitrite, Urine, POC Negative Negative    Leukocyte Esterase, Urine, POC Negative Negative   Results for orders placed or performed in visit on 09/08/22   AMB POC URINALYSIS DIP STICK AUTO W/O MICRO   Result Value Ref Range    Color, Urine, POC yellow     Clarity, Urine, POC clear     Glucose, Urine, POC Negative Negative    Bilirubin, Urine, POC Negative Negative    Ketones, Urine, POC Negative Negative    Specific Gravity, Urine, POC 1.020 1.001 - 1.035    Blood, Urine, POC trace Negative    pH, Urine, POC 7.5 4.6 - 8.0    Protein, Urine, POC Negative Negative    Urobilinogen, POC normal     Nitrite, Urine, POC POSITIVE Negative    Leukocyte Esterase, Urine, POC Trace Negative       There were no vitals taken for this visit. GENERAL: NAD, ALERT, A&O x 3, GAIT NORMAL  LUNGS: easy work of breathing  ABDOMEN: soft, non tender  NEUROLOGIC: cranial nerves 2-12 grossly intact           Assessment and Plan    ICD-10-CM    1. Malignant neoplasm of prostate (HCC)  C61 AMB POC URINALYSIS DIP STICK AUTO W/O MICRO     PSA, Diagnostic      2. Benign prostatic hyperplasia with urinary obstruction  N40.1 AMB POC URINALYSIS DIP STICK AUTO W/O MICRO    N13.8 DISCONTINUED: tamsulosin (FLOMAX) 0.4 MG capsule          In regards to LUTS, he will increase tamsulosin to bid. In regards to prostate cancer, psa is low and stable. Pt. will follow up in 12 months with psa. I have spent 30 minutes today reviewing previous notes, test results and face to face with the patient as well as documenting.

## 2023-10-23 RX ORDER — TAMSULOSIN HYDROCHLORIDE 0.4 MG/1
CAPSULE ORAL
Qty: 90 CAPSULE | Refills: 3 | OUTPATIENT
Start: 2023-10-23

## 2023-10-26 ENCOUNTER — OFFICE VISIT (OUTPATIENT)
Dept: PULMONOLOGY | Age: 78
End: 2023-10-26
Payer: MEDICARE

## 2023-10-26 VITALS
TEMPERATURE: 97.3 F | BODY MASS INDEX: 29.18 KG/M2 | DIASTOLIC BLOOD PRESSURE: 66 MMHG | RESPIRATION RATE: 17 BRPM | WEIGHT: 197 LBS | HEIGHT: 69 IN | HEART RATE: 75 BPM | SYSTOLIC BLOOD PRESSURE: 112 MMHG | OXYGEN SATURATION: 99 %

## 2023-10-26 DIAGNOSIS — Z87.891 FORMER TOBACCO USE: ICD-10-CM

## 2023-10-26 DIAGNOSIS — J98.6 ELEVATED HEMIDIAPHRAGM: ICD-10-CM

## 2023-10-26 DIAGNOSIS — J44.9 CHRONIC OBSTRUCTIVE PULMONARY DISEASE, UNSPECIFIED COPD TYPE (HCC): ICD-10-CM

## 2023-10-26 DIAGNOSIS — R06.09 DYSPNEA ON EXERTION: Primary | ICD-10-CM

## 2023-10-26 PROCEDURE — G8484 FLU IMMUNIZE NO ADMIN: HCPCS | Performed by: NURSE PRACTITIONER

## 2023-10-26 PROCEDURE — 3023F SPIROM DOC REV: CPT | Performed by: NURSE PRACTITIONER

## 2023-10-26 PROCEDURE — 3074F SYST BP LT 130 MM HG: CPT | Performed by: NURSE PRACTITIONER

## 2023-10-26 PROCEDURE — G8427 DOCREV CUR MEDS BY ELIG CLIN: HCPCS | Performed by: NURSE PRACTITIONER

## 2023-10-26 PROCEDURE — 99215 OFFICE O/P EST HI 40 MIN: CPT | Performed by: NURSE PRACTITIONER

## 2023-10-26 PROCEDURE — 1123F ACP DISCUSS/DSCN MKR DOCD: CPT | Performed by: NURSE PRACTITIONER

## 2023-10-26 PROCEDURE — 3078F DIAST BP <80 MM HG: CPT | Performed by: NURSE PRACTITIONER

## 2023-10-26 PROCEDURE — G8417 CALC BMI ABV UP PARAM F/U: HCPCS | Performed by: NURSE PRACTITIONER

## 2023-10-26 PROCEDURE — 1036F TOBACCO NON-USER: CPT | Performed by: NURSE PRACTITIONER

## 2023-10-26 RX ORDER — BUDESONIDE 0.5 MG/2ML
1 INHALANT ORAL 2 TIMES DAILY
Qty: 60 EACH | Refills: 3 | Status: SHIPPED | OUTPATIENT
Start: 2023-10-26

## 2023-10-26 RX ORDER — IPRATROPIUM BROMIDE AND ALBUTEROL SULFATE 2.5; .5 MG/3ML; MG/3ML
1 SOLUTION RESPIRATORY (INHALATION) EVERY 6 HOURS
Qty: 360 ML | Refills: 5 | Status: SHIPPED | OUTPATIENT
Start: 2023-10-26

## 2023-10-26 NOTE — PROGRESS NOTES
DAILY BEFORE BREAKFAST    fish oil-omega-3 fatty acids 1000 MG capsule 2 tablets, Oral    gabapentin (NEURONTIN) 100 mg, Oral, 3 TIMES DAILY    ipratropium 0.5 mg-albuterol 2.5 mg (DUONEB) 0.5-2.5 (3) MG/3ML SOLN nebulizer solution 3 mLs, Inhalation, EVERY 6 HOURS    lisinopril (PRINIVIL;ZESTRIL) 20 mg, Oral, DAILY, Take 1 tablet by mouth twice a day for hypertension    magnesium hydroxide (MILK OF MAGNESIA) 400 MG/5ML suspension 30 mLs, DAILY PRN    metoprolol tartrate (LOPRESSOR) 12.5 mg, Oral, EVERY 12 HOURS    Multiple Vitamins-Minerals (ONE-A-DAY MENS 50+ ADVANTAGE PO) 1 tablet, Oral, DAILY    nitroGLYCERIN (NITROSTAT) 0.4 mg, SubLINGual    omeprazole (PRILOSEC) 20 mg, Oral, DAILY    Oxygen Concentrator 2 L/min, Inhalation, CONTINUOUS    potassium gluconate 550 mg tablet Take 1 tab BID    pravastatin (PRAVACHOL) 80 mg, Oral, DAILY    Psyllium Husk 12 g, 3 TIMES DAILY PRN    SELENIUM PO DAILY    tamsulosin (FLOMAX) 0.4 mg, Oral, 2 TIMES DAILY    zinc 50 MG CAPS Oral, DAILY

## 2023-10-26 NOTE — PATIENT INSTRUCTIONS
If any of your medicines are really expensive, we can usually help you with a work-around. We understand that insurance companies have different 'preferred' medications. These preferences can change yearly and can be difficult to track. Depending upon your insurance and their preferred medicines, some medications we prescribe may be too expensive. If this happens, we recommend that you find out what inhalers for COPD or asthma are \"preferred\" on your insurance drug formulary by calling the member services phone number on the back of the insurance card. The cost of your medication can be dramatically different depending on this. Once the preferred inhalers are known, please send us a message in [facility] or call us back at [phone] with this information. We will then choose the best option available for you and send that prescription to your pharmacy on file.     LABA/LAMA Inhalers:  Mj Holder     ICS/LABA/LAMA Inhalers:  Trelegy  [employer]

## 2023-11-01 DIAGNOSIS — E78.00 PURE HYPERCHOLESTEROLEMIA: ICD-10-CM

## 2023-11-01 DIAGNOSIS — I48.3 TYPICAL ATRIAL FLUTTER (HCC): ICD-10-CM

## 2023-11-01 DIAGNOSIS — I25.10 CAD IN NATIVE ARTERY: ICD-10-CM

## 2023-11-01 DIAGNOSIS — K21.9 GASTRO-ESOPHAGEAL REFLUX DISEASE WITHOUT ESOPHAGITIS: ICD-10-CM

## 2023-11-01 DIAGNOSIS — I10 ESSENTIAL HYPERTENSION WITH GOAL BLOOD PRESSURE LESS THAN 140/90: ICD-10-CM

## 2023-11-01 NOTE — TELEPHONE ENCOUNTER
Pt's urologist prescribes doxazosin. Need to notify when he comes tomorrow that he needs to contact his urologist to request refills on that medication. He had an appointment with Dr. Jiles Lanes on 10/12/2023.

## 2023-11-01 NOTE — TELEPHONE ENCOUNTER
Pt called requesting refills on medication including doxazosin 8 mg qsh. Pharmacy confirmed. Also needs appointment AWV scheduled for Monday. Wanting PAPER SCRIPTS to  tomorrow.

## 2023-11-02 ENCOUNTER — TELEPHONE (OUTPATIENT)
Dept: FAMILY MEDICINE CLINIC | Facility: CLINIC | Age: 78
End: 2023-11-02

## 2023-11-02 DIAGNOSIS — I10 ESSENTIAL HYPERTENSION WITH GOAL BLOOD PRESSURE LESS THAN 140/90: ICD-10-CM

## 2023-11-02 RX ORDER — OMEPRAZOLE 20 MG/1
20 CAPSULE, DELAYED RELEASE ORAL DAILY
Qty: 30 CAPSULE | Refills: 0 | Status: SHIPPED | OUTPATIENT
Start: 2023-11-02

## 2023-11-02 RX ORDER — DOXAZOSIN 8 MG/1
8 TABLET ORAL 2 TIMES DAILY
Qty: 60 TABLET | Refills: 0 | Status: SHIPPED | OUTPATIENT
Start: 2023-11-02

## 2023-11-02 RX ORDER — LISINOPRIL 20 MG/1
20 TABLET ORAL DAILY
Qty: 30 TABLET | Refills: 0 | Status: SHIPPED | OUTPATIENT
Start: 2023-11-02

## 2023-11-02 RX ORDER — CLOPIDOGREL BISULFATE 75 MG/1
75 TABLET ORAL DAILY
Qty: 30 TABLET | Refills: 0 | Status: SHIPPED | OUTPATIENT
Start: 2023-11-02

## 2023-11-02 NOTE — TELEPHONE ENCOUNTER
Patient came in today requesting a refill for his doxazosin. We offered to send it into the pharmacy electronically. Patient refused and said he will wait for a paper script. We told him that dr. Christine Piña was seeing patients and he would have to wait till he is done to get a paper copy of the refill. Patient verbalized understanding and said he would wait. Once dr. Christine Piña was done I told him about it and he printed the script for the patient. The patient then said he would make a complaint that he had to wait.

## 2023-11-06 ENCOUNTER — OFFICE VISIT (OUTPATIENT)
Dept: FAMILY MEDICINE CLINIC | Facility: CLINIC | Age: 78
End: 2023-11-06
Payer: MEDICARE

## 2023-11-06 VITALS
DIASTOLIC BLOOD PRESSURE: 62 MMHG | SYSTOLIC BLOOD PRESSURE: 118 MMHG | OXYGEN SATURATION: 99 % | HEART RATE: 66 BPM | HEIGHT: 69 IN | BODY MASS INDEX: 28.73 KG/M2 | WEIGHT: 194 LBS

## 2023-11-06 DIAGNOSIS — I25.10 CAD IN NATIVE ARTERY: ICD-10-CM

## 2023-11-06 DIAGNOSIS — K21.9 GASTRO-ESOPHAGEAL REFLUX DISEASE WITHOUT ESOPHAGITIS: ICD-10-CM

## 2023-11-06 DIAGNOSIS — E78.00 PURE HYPERCHOLESTEROLEMIA: ICD-10-CM

## 2023-11-06 DIAGNOSIS — D50.9 IRON DEFICIENCY ANEMIA, UNSPECIFIED IRON DEFICIENCY ANEMIA TYPE: ICD-10-CM

## 2023-11-06 DIAGNOSIS — I10 ESSENTIAL HYPERTENSION WITH GOAL BLOOD PRESSURE LESS THAN 140/90: ICD-10-CM

## 2023-11-06 DIAGNOSIS — I48.3 TYPICAL ATRIAL FLUTTER (HCC): ICD-10-CM

## 2023-11-06 DIAGNOSIS — K62.5 RECTAL BLEEDING: ICD-10-CM

## 2023-11-06 DIAGNOSIS — K64.0 GRADE I HEMORRHOIDS: ICD-10-CM

## 2023-11-06 DIAGNOSIS — Z00.00 MEDICARE ANNUAL WELLNESS VISIT, SUBSEQUENT: ICD-10-CM

## 2023-11-06 DIAGNOSIS — Z12.5 SPECIAL SCREENING FOR MALIGNANT NEOPLASM OF PROSTATE: Primary | ICD-10-CM

## 2023-11-06 LAB
ALBUMIN SERPL-MCNC: 4 G/DL (ref 3.2–4.6)
ALBUMIN/GLOB SERPL: 1.2 (ref 0.4–1.6)
ALP SERPL-CCNC: 67 U/L (ref 50–136)
ALT SERPL-CCNC: 16 U/L (ref 12–65)
ANION GAP SERPL CALC-SCNC: 5 MMOL/L (ref 2–11)
AST SERPL-CCNC: 18 U/L (ref 15–37)
BASOPHILS # BLD: 0 K/UL (ref 0–0.2)
BASOPHILS NFR BLD: 1 % (ref 0–2)
BILIRUB SERPL-MCNC: 0.6 MG/DL (ref 0.2–1.1)
BILIRUBIN, URINE, POC: NEGATIVE
BLOOD URINE, POC: ABNORMAL
BUN SERPL-MCNC: 18 MG/DL (ref 8–23)
CALCIUM SERPL-MCNC: 9.2 MG/DL (ref 8.3–10.4)
CHLORIDE SERPL-SCNC: 105 MMOL/L (ref 101–110)
CHOLEST SERPL-MCNC: 172 MG/DL
CO2 SERPL-SCNC: 31 MMOL/L (ref 21–32)
CREAT SERPL-MCNC: 0.7 MG/DL (ref 0.8–1.5)
DIFFERENTIAL METHOD BLD: ABNORMAL
EOSINOPHIL # BLD: 0.1 K/UL (ref 0–0.8)
EOSINOPHIL NFR BLD: 2 % (ref 0.5–7.8)
ERYTHROCYTE [DISTWIDTH] IN BLOOD BY AUTOMATED COUNT: 13.3 % (ref 11.9–14.6)
GLOBULIN SER CALC-MCNC: 3.4 G/DL (ref 2.8–4.5)
GLUCOSE SERPL-MCNC: 80 MG/DL (ref 65–100)
GLUCOSE URINE, POC: NEGATIVE
HCT VFR BLD AUTO: 39.2 % (ref 41.1–50.3)
HDLC SERPL-MCNC: 78 MG/DL (ref 40–60)
HDLC SERPL: 2.2
HGB BLD-MCNC: 12.5 G/DL (ref 13.6–17.2)
IMM GRANULOCYTES # BLD AUTO: 0 K/UL (ref 0–0.5)
IMM GRANULOCYTES NFR BLD AUTO: 0 % (ref 0–5)
KETONES, URINE, POC: ABNORMAL
LDLC SERPL CALC-MCNC: 83.6 MG/DL
LEUKOCYTE ESTERASE, URINE, POC: NEGATIVE
LYMPHOCYTES # BLD: 0.8 K/UL (ref 0.5–4.6)
LYMPHOCYTES NFR BLD: 21 % (ref 13–44)
MCH RBC QN AUTO: 30.2 PG (ref 26.1–32.9)
MCHC RBC AUTO-ENTMCNC: 31.9 G/DL (ref 31.4–35)
MCV RBC AUTO: 94.7 FL (ref 82–102)
MONOCYTES # BLD: 0.7 K/UL (ref 0.1–1.3)
MONOCYTES NFR BLD: 18 % (ref 4–12)
NEUTS SEG # BLD: 2.2 K/UL (ref 1.7–8.2)
NEUTS SEG NFR BLD: 58 % (ref 43–78)
NITRITE, URINE, POC: NEGATIVE
NRBC # BLD: 0 K/UL (ref 0–0.2)
PH, URINE, POC: 5.5 (ref 4.6–8)
PLATELET # BLD AUTO: 153 K/UL (ref 150–450)
PMV BLD AUTO: 9.4 FL (ref 9.4–12.3)
POTASSIUM SERPL-SCNC: 3.9 MMOL/L (ref 3.5–5.1)
PROT SERPL-MCNC: 7.4 G/DL (ref 6.3–8.2)
PROTEIN,URINE, POC: NEGATIVE
PSA SERPL-MCNC: 0.2 NG/ML
RBC # BLD AUTO: 4.14 M/UL (ref 4.23–5.6)
SODIUM SERPL-SCNC: 141 MMOL/L (ref 133–143)
SPECIFIC GRAVITY, URINE, POC: 1.02 (ref 1–1.03)
TRIGL SERPL-MCNC: 52 MG/DL (ref 35–150)
URINALYSIS CLARITY, POC: CLEAR
URINALYSIS COLOR, POC: YELLOW
UROBILINOGEN, POC: NORMAL
VLDLC SERPL CALC-MCNC: 10.4 MG/DL (ref 6–23)
WBC # BLD AUTO: 3.8 K/UL (ref 4.3–11.1)

## 2023-11-06 PROCEDURE — G8484 FLU IMMUNIZE NO ADMIN: HCPCS | Performed by: FAMILY MEDICINE

## 2023-11-06 PROCEDURE — 3074F SYST BP LT 130 MM HG: CPT | Performed by: FAMILY MEDICINE

## 2023-11-06 PROCEDURE — 1123F ACP DISCUSS/DSCN MKR DOCD: CPT | Performed by: FAMILY MEDICINE

## 2023-11-06 PROCEDURE — 81003 URINALYSIS AUTO W/O SCOPE: CPT | Performed by: FAMILY MEDICINE

## 2023-11-06 PROCEDURE — G0439 PPPS, SUBSEQ VISIT: HCPCS | Performed by: FAMILY MEDICINE

## 2023-11-06 PROCEDURE — 3078F DIAST BP <80 MM HG: CPT | Performed by: FAMILY MEDICINE

## 2023-11-06 RX ORDER — FERROUS SULFATE 325(65) MG
325 TABLET ORAL
Qty: 90 TABLET | Refills: 3 | Status: SHIPPED | OUTPATIENT
Start: 2023-11-06

## 2023-11-06 RX ORDER — OMEPRAZOLE 20 MG/1
20 CAPSULE, DELAYED RELEASE ORAL DAILY
Qty: 90 CAPSULE | Refills: 3 | Status: SHIPPED | OUTPATIENT
Start: 2023-11-06

## 2023-11-06 RX ORDER — DOXAZOSIN 8 MG/1
8 TABLET ORAL 2 TIMES DAILY
Qty: 180 TABLET | Refills: 3 | Status: SHIPPED | OUTPATIENT
Start: 2023-11-06 | End: 2024-10-31

## 2023-11-06 RX ORDER — CLOPIDOGREL BISULFATE 75 MG/1
75 TABLET ORAL DAILY
Qty: 90 TABLET | Refills: 3 | Status: SHIPPED | OUTPATIENT
Start: 2023-11-06

## 2023-11-06 RX ORDER — HYDROCORTISONE ACETATE 25 MG/1
25 SUPPOSITORY RECTAL 2 TIMES DAILY
Qty: 30 SUPPOSITORY | Refills: 2 | Status: SHIPPED | OUTPATIENT
Start: 2023-11-06

## 2023-11-06 RX ORDER — LISINOPRIL 20 MG/1
20 TABLET ORAL DAILY
Qty: 90 TABLET | Refills: 3 | Status: SHIPPED | OUTPATIENT
Start: 2023-11-06

## 2023-11-06 RX ORDER — PRAVASTATIN SODIUM 80 MG/1
80 TABLET ORAL DAILY
Qty: 90 TABLET | Refills: 3 | Status: SHIPPED | OUTPATIENT
Start: 2023-11-06

## 2023-11-06 ASSESSMENT — PATIENT HEALTH QUESTIONNAIRE - PHQ9
2. FEELING DOWN, DEPRESSED OR HOPELESS: 0
SUM OF ALL RESPONSES TO PHQ QUESTIONS 1-9: 0
1. LITTLE INTEREST OR PLEASURE IN DOING THINGS: 0
SUM OF ALL RESPONSES TO PHQ9 QUESTIONS 1 & 2: 0
SUM OF ALL RESPONSES TO PHQ QUESTIONS 1-9: 0

## 2023-11-06 NOTE — PROGRESS NOTES
Medicare Annual Wellness Visit    [facility]. is here for No chief complaint on file. Assessment & Plan   Special screening for malignant neoplasm of prostate  -     PSA Screening; Future  Typical atrial flutter (HCC)  -     clopidogrel (PLAVIX) 75 MG tablet; Take 1 tablet by mouth daily, Disp-90 tablet, R-3Print  CAD in native artery  -     clopidogrel (PLAVIX) 75 MG tablet; Take 1 tablet by mouth daily, Disp-90 tablet, R-3Print  Essential hypertension with goal blood pressure less than 140/90  -     doxazosin (CARDURA) 8 MG tablet; Take 1 tablet by mouth 2 times daily, Disp-180 tablet, R-3Print  -     lisinopril (PRINIVIL;ZESTRIL) 20 MG tablet; Take 1 tablet by mouth daily Take 1 tablet by mouth twice a day for hypertension, Disp-90 tablet, R-3Print  -     metoprolol tartrate (LOPRESSOR) 25 MG tablet; Take 0.5 tablets by mouth in the morning and 0.5 tablets in the evening., Disp-90 tablet, R-3Print  -     potassium gluconate 550 mg tablet; Take 1 tab BID, Disp-180 tablet, R-3Print  -     Comprehensive Metabolic Panel; Future  -     CBC with Auto Differential; Future  -     AMB POC URINALYSIS DIP STICK AUTO W/O MICRO; Future  Iron deficiency anemia, unspecified iron deficiency anemia type  -     ferrous sulfate (IRON 325) 325 (65 Fe) MG tablet; Take 1 tablet by mouth every morning (before breakfast), Disp-90 tablet, R-3Print  Gastro-esophageal reflux disease without esophagitis  -     omeprazole (PRILOSEC) 20 MG delayed release capsule; Take 1 capsule by mouth Daily, Disp-90 capsule, R-3Print  Pure hypercholesterolemia  -     pravastatin (PRAVACHOL) 80 MG tablet; Take 1 tablet by mouth daily, Disp-90 tablet, R-3Print  -     Lipid Panel;  Future  Grade I hemorrhoids  -     hydrocortisone (ANUSOL-HC) 25 MG suppository; Place 1 suppository rectally 2 times daily Insert 1 suppository into rectum every 12 hours as needed for hemorrhoids, Disp-30 suppository, R-2Print  Rectal bleeding  -     External Referral To

## 2023-11-07 DIAGNOSIS — E78.00 PURE HYPERCHOLESTEROLEMIA: ICD-10-CM

## 2023-11-07 DIAGNOSIS — I25.10 CAD IN NATIVE ARTERY: ICD-10-CM

## 2023-11-07 DIAGNOSIS — I10 ESSENTIAL HYPERTENSION WITH GOAL BLOOD PRESSURE LESS THAN 140/90: ICD-10-CM

## 2023-11-07 DIAGNOSIS — I48.3 TYPICAL ATRIAL FLUTTER (HCC): ICD-10-CM

## 2023-11-07 RX ORDER — TAMSULOSIN HYDROCHLORIDE 0.4 MG/1
CAPSULE ORAL
Qty: 90 CAPSULE | Refills: 3 | Status: SHIPPED | OUTPATIENT
Start: 2023-11-07

## 2023-11-08 DIAGNOSIS — K21.9 GASTRO-ESOPHAGEAL REFLUX DISEASE WITHOUT ESOPHAGITIS: ICD-10-CM

## 2023-11-08 DIAGNOSIS — I10 ESSENTIAL HYPERTENSION WITH GOAL BLOOD PRESSURE LESS THAN 140/90: ICD-10-CM

## 2023-11-08 RX ORDER — LISINOPRIL 20 MG/1
20 TABLET ORAL 2 TIMES DAILY
Qty: 180 TABLET | Refills: 3 | Status: CANCELLED | OUTPATIENT
Start: 2023-11-08

## 2023-11-08 RX ORDER — OMEPRAZOLE 20 MG/1
20 CAPSULE, DELAYED RELEASE ORAL 2 TIMES DAILY
Qty: 180 CAPSULE | Refills: 3 | Status: CANCELLED | OUTPATIENT
Start: 2023-11-08

## 2023-11-08 NOTE — TELEPHONE ENCOUNTER
Rx sent to [facility] pharmacy yesterday  pharmacy called today for clarification on direction verbal order given to pharmacy

## 2023-11-10 ENCOUNTER — TELEPHONE (OUTPATIENT)
Dept: FAMILY MEDICINE CLINIC | Facility: CLINIC | Age: 78
End: 2023-11-10

## 2023-11-10 RX ORDER — CLOPIDOGREL BISULFATE 75 MG/1
75 TABLET ORAL DAILY
Qty: 90 TABLET | Refills: 3 | OUTPATIENT
Start: 2023-11-10

## 2023-11-10 RX ORDER — DOXAZOSIN 8 MG/1
8 TABLET ORAL 2 TIMES DAILY
Qty: 180 TABLET | Refills: 3 | OUTPATIENT
Start: 2023-11-10

## 2023-11-10 RX ORDER — LISINOPRIL 20 MG/1
20 TABLET ORAL 2 TIMES DAILY
Qty: 180 TABLET | Refills: 2 | OUTPATIENT
Start: 2023-11-10

## 2023-11-10 RX ORDER — PRAVASTATIN SODIUM 80 MG/1
80 TABLET ORAL DAILY
Qty: 90 TABLET | Refills: 3 | OUTPATIENT
Start: 2023-11-10

## 2023-11-10 NOTE — TELEPHONE ENCOUNTER
Called [phone] spoke with their office and asked if they had free lung cancer screenings on Saturday 11/11/23. The office stated no. I called patient and notified him about this. Patient states that he is a smoker and he wants to get this test done. I offered to schedule him an appointment to see Dr. Vinnie Boyle so they may discuss this test and if he needs it. December 1 2023 at 3:30pm. Patient declined scheduling appointment.

## 2023-11-13 ENCOUNTER — TELEPHONE (OUTPATIENT)
Dept: FAMILY MEDICINE CLINIC | Facility: CLINIC | Age: 78
End: 2023-11-13

## 2023-11-13 DIAGNOSIS — I10 ESSENTIAL HYPERTENSION WITH GOAL BLOOD PRESSURE LESS THAN 140/90: ICD-10-CM

## 2023-11-13 DIAGNOSIS — K21.9 GASTRO-ESOPHAGEAL REFLUX DISEASE WITHOUT ESOPHAGITIS: ICD-10-CM

## 2023-11-13 NOTE — TELEPHONE ENCOUNTER
Carry from [facility] pharmacy called [phone] who said she needs clarification on two medications prescribed by Dr. Veena Rodriguez. Omeprazole directions prescribed was to take once a day. Patient states he takes it twice a day. The second medication is lisinopril- the directions prescribed says for patient to take it once and twice a day. Will call back with clarification. Per [facility] patient can take omeprazole twice a day and lisinopril twice a day. Called and notified tere at [facility].

## 2023-12-05 ENCOUNTER — TELEPHONE (OUTPATIENT)
Dept: PULMONOLOGY | Age: 78
End: 2023-12-05

## 2023-12-05 DIAGNOSIS — R29.818 SUSPECTED SLEEP APNEA: ICD-10-CM

## 2023-12-05 DIAGNOSIS — J98.6 ELEVATED HEMIDIAPHRAGM: Primary | ICD-10-CM

## 2023-12-05 DIAGNOSIS — J44.9 CHRONIC OBSTRUCTIVE PULMONARY DISEASE, UNSPECIFIED COPD TYPE (HCC): ICD-10-CM

## 2023-12-05 NOTE — TELEPHONE ENCOUNTER
Patient says has decided to have a HST . He would like to hve it done before the end of the year because of his deductable . He said that you and him had discussed it at last visit

## 2023-12-27 ENCOUNTER — TELEPHONE (OUTPATIENT)
Dept: UROLOGY | Age: 78
End: 2023-12-27

## 2024-01-14 ENCOUNTER — CLINICAL DOCUMENTATION (OUTPATIENT)
Dept: SLEEP MEDICINE | Age: 79
End: 2024-01-14

## 2024-01-14 DIAGNOSIS — G47.33 OSA (OBSTRUCTIVE SLEEP APNEA): Primary | ICD-10-CM

## 2024-01-17 ENCOUNTER — HOSPITAL ENCOUNTER (OUTPATIENT)
Dept: SLEEP MEDICINE | Age: 79
Discharge: HOME | End: 2024-01-20

## 2024-01-29 ENCOUNTER — TELEPHONE (OUTPATIENT)
Dept: SLEEP MEDICINE | Age: 79
End: 2024-01-29

## 2024-01-29 DIAGNOSIS — G47.33 OSA (OBSTRUCTIVE SLEEP APNEA): Primary | ICD-10-CM

## 2024-01-29 NOTE — TELEPHONE ENCOUNTER
Patient had recent sleep study showing mild sleep apnea. Cpap therapy is recommended per sleep interp.  Patient has agreed to start CPAP therapy. Order sent to [facility].     Mis Chino MA

## 2024-04-05 ENCOUNTER — OFFICE VISIT (OUTPATIENT)
Dept: FAMILY MEDICINE CLINIC | Facility: CLINIC | Age: 79
End: 2024-04-05
Payer: MEDICARE

## 2024-04-05 VITALS
SYSTOLIC BLOOD PRESSURE: 130 MMHG | HEART RATE: 62 BPM | WEIGHT: 205 LBS | HEIGHT: 69 IN | DIASTOLIC BLOOD PRESSURE: 82 MMHG | BODY MASS INDEX: 30.36 KG/M2

## 2024-04-05 DIAGNOSIS — K62.5 RECTAL BLEEDING: ICD-10-CM

## 2024-04-05 DIAGNOSIS — I10 ESSENTIAL HYPERTENSION WITH GOAL BLOOD PRESSURE LESS THAN 140/90: ICD-10-CM

## 2024-04-05 DIAGNOSIS — D50.9 IRON DEFICIENCY ANEMIA, UNSPECIFIED IRON DEFICIENCY ANEMIA TYPE: ICD-10-CM

## 2024-04-05 DIAGNOSIS — K64.0 GRADE I HEMORRHOIDS: ICD-10-CM

## 2024-04-05 DIAGNOSIS — I25.10 CAD IN NATIVE ARTERY: ICD-10-CM

## 2024-04-05 DIAGNOSIS — I48.3 TYPICAL ATRIAL FLUTTER (HCC): ICD-10-CM

## 2024-04-05 DIAGNOSIS — E78.00 PURE HYPERCHOLESTEROLEMIA: ICD-10-CM

## 2024-04-05 DIAGNOSIS — K21.9 GASTRO-ESOPHAGEAL REFLUX DISEASE WITHOUT ESOPHAGITIS: ICD-10-CM

## 2024-04-05 PROCEDURE — 1036F TOBACCO NON-USER: CPT | Performed by: FAMILY MEDICINE

## 2024-04-05 PROCEDURE — 3079F DIAST BP 80-89 MM HG: CPT | Performed by: FAMILY MEDICINE

## 2024-04-05 PROCEDURE — G8427 DOCREV CUR MEDS BY ELIG CLIN: HCPCS | Performed by: FAMILY MEDICINE

## 2024-04-05 PROCEDURE — 99213 OFFICE O/P EST LOW 20 MIN: CPT | Performed by: FAMILY MEDICINE

## 2024-04-05 PROCEDURE — 3075F SYST BP GE 130 - 139MM HG: CPT | Performed by: FAMILY MEDICINE

## 2024-04-05 PROCEDURE — G8417 CALC BMI ABV UP PARAM F/U: HCPCS | Performed by: FAMILY MEDICINE

## 2024-04-05 PROCEDURE — 1123F ACP DISCUSS/DSCN MKR DOCD: CPT | Performed by: FAMILY MEDICINE

## 2024-04-05 RX ORDER — LISINOPRIL 20 MG/1
20 TABLET ORAL DAILY
Qty: 90 TABLET | Refills: 3 | Status: SHIPPED | OUTPATIENT
Start: 2024-04-05

## 2024-04-05 RX ORDER — PRAVASTATIN SODIUM 80 MG/1
80 TABLET ORAL DAILY
Qty: 90 TABLET | Refills: 3 | Status: SHIPPED | OUTPATIENT
Start: 2024-04-05

## 2024-04-05 RX ORDER — FERROUS SULFATE 325(65) MG
325 TABLET ORAL
Qty: 90 TABLET | Refills: 3 | Status: SHIPPED | OUTPATIENT
Start: 2024-04-05

## 2024-04-05 RX ORDER — CLOPIDOGREL BISULFATE 75 MG/1
75 TABLET ORAL DAILY
Qty: 90 TABLET | Refills: 3 | Status: SHIPPED | OUTPATIENT
Start: 2024-04-05

## 2024-04-05 RX ORDER — DOXAZOSIN 8 MG/1
8 TABLET ORAL 2 TIMES DAILY
Qty: 180 TABLET | Refills: 3 | Status: SHIPPED | OUTPATIENT
Start: 2024-04-05 | End: 2025-03-31

## 2024-04-05 RX ORDER — OMEPRAZOLE 20 MG/1
40 CAPSULE, DELAYED RELEASE ORAL 2 TIMES DAILY
Qty: 180 CAPSULE | Refills: 3 | Status: SHIPPED | OUTPATIENT
Start: 2024-04-05

## 2024-04-05 RX ORDER — IPRATROPIUM BROMIDE AND ALBUTEROL SULFATE 2.5; .5 MG/3ML; MG/3ML
1 SOLUTION RESPIRATORY (INHALATION) EVERY 4 HOURS
COMMUNITY

## 2024-04-05 ASSESSMENT — ENCOUNTER SYMPTOMS
VOMITING: 0
NAUSEA: 0

## 2024-04-05 NOTE — PROGRESS NOTES
PROGRESS NOTE    SUBJECTIVE:   Tadeo Griffiths Sr. is a 79 y.o. male seen for a follow up visit regarding the following chief complaint:     Chief Complaint   Patient presents with    Follow-up           HPI patient presents office today for follow-up without complaints      Past Medical History, Past Surgical History, Family history, Social History, and Medications were all reviewed with the patient today and updated as necessary.       Current Outpatient Medications   Medication Sig Dispense Refill    ipratropium 0.5 mg-albuterol 2.5 mg (DUONEB) 0.5-2.5 (3) MG/3ML SOLN nebulizer solution Inhale 3 mLs into the lungs every 4 hours      pravastatin (PRAVACHOL) 80 MG tablet Take 1 tablet by mouth daily 90 tablet 3    potassium gluconate 550 mg tablet Take 1 tab  tablet 3    omeprazole (PRILOSEC) 20 MG delayed release capsule Take 2 capsules by mouth in the morning and at bedtime 180 capsule 3    doxazosin (CARDURA) 8 MG tablet Take 1 tablet by mouth 2 times daily 180 tablet 3    ferrous sulfate (IRON 325) 325 (65 Fe) MG tablet Take 1 tablet by mouth every morning (before breakfast) 90 tablet 3    lisinopril (PRINIVIL;ZESTRIL) 20 MG tablet Take 1 tablet by mouth daily Take 1 tablet by mouth twice a day for hypertension 90 tablet 3    metoprolol tartrate (LOPRESSOR) 25 MG tablet Take 0.5 tablets by mouth in the morning and 0.5 tablets in the evening. 90 tablet 3    clopidogrel (PLAVIX) 75 MG tablet Take 1 tablet by mouth daily 90 tablet 3    tamsulosin (FLOMAX) 0.4 MG capsule TAKE ONE CAPSULE BY MOUTH IN THE EVENING 90 capsule 3    Oxygen Concentrator Inhale 2 L/min into the lungs continuous.      acetaminophen (TYLENOL) 500 MG tablet Take 1 tablet by mouth every 4 hours as needed for Fever or Pain May take up to 2 tablets.      Psyllium Husk 100 % POWD Take 12 g by mouth 3 times daily as needed (constipation) in 8 oz water      magnesium hydroxide (MILK OF MAGNESIA) 400 MG/5ML suspension Take 30 mLs by mouth daily

## 2024-04-18 ENCOUNTER — OFFICE VISIT (OUTPATIENT)
Dept: PULMONOLOGY | Age: 79
End: 2024-04-18
Payer: MEDICARE

## 2024-04-18 VITALS
HEIGHT: 69 IN | SYSTOLIC BLOOD PRESSURE: 126 MMHG | RESPIRATION RATE: 18 BRPM | HEART RATE: 74 BPM | OXYGEN SATURATION: 96 % | DIASTOLIC BLOOD PRESSURE: 72 MMHG | BODY MASS INDEX: 31.1 KG/M2 | WEIGHT: 210 LBS | TEMPERATURE: 96.8 F

## 2024-04-18 DIAGNOSIS — Z87.891 FORMER TOBACCO USE: ICD-10-CM

## 2024-04-18 DIAGNOSIS — J44.9 CHRONIC OBSTRUCTIVE PULMONARY DISEASE, UNSPECIFIED COPD TYPE (HCC): ICD-10-CM

## 2024-04-18 DIAGNOSIS — J98.6 ELEVATED HEMIDIAPHRAGM: Primary | ICD-10-CM

## 2024-04-18 DIAGNOSIS — G47.33 OSA ON CPAP: ICD-10-CM

## 2024-04-18 PROCEDURE — 99214 OFFICE O/P EST MOD 30 MIN: CPT | Performed by: NURSE PRACTITIONER

## 2024-04-18 RX ORDER — BUDESONIDE, GLYCOPYRROLATE, AND FORMOTEROL FUMARATE 160; 9; 4.8 UG/1; UG/1; UG/1
2 AEROSOL, METERED RESPIRATORY (INHALATION) 2 TIMES DAILY
Qty: 1 EACH | Refills: 11 | Status: SHIPPED | OUTPATIENT
Start: 2024-04-18

## 2024-04-18 RX ORDER — BUDESONIDE, GLYCOPYRROLATE, AND FORMOTEROL FUMARATE 160; 9; 4.8 UG/1; UG/1; UG/1
2 AEROSOL, METERED RESPIRATORY (INHALATION) 2 TIMES DAILY
Qty: 1 EACH | Refills: 11 | Status: SHIPPED | OUTPATIENT
Start: 2024-04-18 | End: 2024-04-18

## 2024-04-18 NOTE — PROGRESS NOTES
all his questions.  He usually has many.    No orders of the defined types were placed in this encounter.      No orders of the defined types were placed in this encounter.            MISTI Atkinson - CNP    Collaborating physician is Tali Claudio MD    Total time for encounter on day of encounter was 54 minutes.  This time includes chart prep, review of tests/procedures, review of other provider's notes, documentation and counseling patient regarding disease process and medications.   _________________________________________________________________________    HISTORY OF PRESENT ILLNESS:    Mr. Tadeo Griffiths is a 79 y.o. male who is seen at [facility] today for  Follow-up and COPD      Mr. Tadeo Griffiths is a very pleasant 77 year old male, PMH HTN, CAD, CABG in 2015, GERD, and prostate cancer, here today as a follow up for COPD.  The patient had CPFTs done on 3/14/22 which revealed severe obstructive disease with FEV1 38%.  He was initiated on Breztri, but unfortunately had some issues with the pharmacy cost of the inhaler.  He notices improvement with his breathing when on Breztri, but is very conscious of the cost and has had issues with in the past.  He is not currently on anything but albuterol nebulizers 4 times a day.  He ended up having surgery for his hernia, but reports today that he did not get the improvement in his breathing like he was expecting.  He did require a 6-day hospitalization postsurgery and ended up going home on oxygen.  He is currently using this as needed during the day when he feels short of breath and at night.  Question CT chest with contrast in March 2021 showed significant elevation of the left hemidiaphragm.      The patient does have a history of receiving varicose vein treatment (no stripping) and is being treated for prostate cancer (Lupron).  The patient is a former 0.25 ppd X 40 years smoker, quit in 2001.     REVIEW OF SYSTEMS: 10 point review of systems is negative

## 2024-05-20 NOTE — PROGRESS NOTES
severe neurologic, cardiac, pulmonary, and gastrointestinal problems was discussed. Specifically, the increased incidence of hypertension, coronary artery disease, congestive heart failure, pulmonary hypertension, gastroesophageal reflux, pathologic hypersomnolence, memory loss, and glucose intolerance was related to the consequences of hypoxemia, hypercapnia, airway obstruction, and sympathetic overdrive.  We also discussed the ability of nasal CPAP to correct these abnormalities through maintenance of a patent airway.  Therapeutic options including surgery, oral appliances, and weight loss were also reviewed.     2. Nocturnal hypoxemia  DME - DURABLE MEDICAL EQUIPMENT   Improved on pap therapy. Did not require oxygen into pap therapy during study          PLAN:  Continue CPAP 11 cm  Renew supplies  Change mask to F&P Vitera  Will send message to pulmonary for help with inhalers    Follow up in 6 months or sooner if needed         Orders Placed This Encounter   Procedures    DME - DURABLE MEDICAL EQUIPMENT     [employer]  Change mask to F&P Vitera size medium    [facility]  Phone: [address]  [facility] [address]  Dept: [phone]      Patient Name: Tadeo Griffiths Sr.  : 1945  Gender: male  Address: [address] [address]  Patient phone: [phone] (home)       Primary Insurance: Payor: MEDICARE / Plan: MEDICARE PART A AND B / Product Type: *No Product type* /   Subscriber ID: [id number] - (Medicare)      AMB Supply Order  Order Details     DME Location:   Order Date: 2024   The primary encounter diagnosis was GINNA (obstructive sleep apnea). A diagnosis of Nocturnal hypoxemia was also pertinent to this visit.             (  X   )Supplies Needed       cpap Machine   (     ) CPAP Unit  (     ) Auto CPAP Unit  (     ) BiLevel Unit  (     ) Auto BiLevel Unit  (     ) ASV   (     ) Bilevel ST      Length of need: 12

## 2024-05-21 ENCOUNTER — OFFICE VISIT (OUTPATIENT)
Dept: SLEEP MEDICINE | Age: 79
End: 2024-05-21
Payer: MEDICARE

## 2024-05-21 VITALS
SYSTOLIC BLOOD PRESSURE: 131 MMHG | RESPIRATION RATE: 16 BRPM | WEIGHT: 212 LBS | TEMPERATURE: 97.5 F | BODY MASS INDEX: 31.4 KG/M2 | HEIGHT: 69 IN | DIASTOLIC BLOOD PRESSURE: 75 MMHG | OXYGEN SATURATION: 95 % | HEART RATE: 61 BPM

## 2024-05-21 DIAGNOSIS — G47.33 OSA (OBSTRUCTIVE SLEEP APNEA): Primary | ICD-10-CM

## 2024-05-21 DIAGNOSIS — G47.34 NOCTURNAL HYPOXEMIA: ICD-10-CM

## 2024-05-21 PROCEDURE — 1123F ACP DISCUSS/DSCN MKR DOCD: CPT | Performed by: NURSE PRACTITIONER

## 2024-05-21 PROCEDURE — 99215 OFFICE O/P EST HI 40 MIN: CPT | Performed by: NURSE PRACTITIONER

## 2024-05-21 PROCEDURE — G8427 DOCREV CUR MEDS BY ELIG CLIN: HCPCS | Performed by: NURSE PRACTITIONER

## 2024-05-21 PROCEDURE — 3078F DIAST BP <80 MM HG: CPT | Performed by: NURSE PRACTITIONER

## 2024-05-21 PROCEDURE — G8417 CALC BMI ABV UP PARAM F/U: HCPCS | Performed by: NURSE PRACTITIONER

## 2024-05-21 PROCEDURE — 1036F TOBACCO NON-USER: CPT | Performed by: NURSE PRACTITIONER

## 2024-05-21 PROCEDURE — 3075F SYST BP GE 130 - 139MM HG: CPT | Performed by: NURSE PRACTITIONER

## 2024-05-21 ASSESSMENT — SLEEP AND FATIGUE QUESTIONNAIRES
HOW LIKELY ARE YOU TO NOD OFF OR FALL ASLEEP WHILE SITTING QUIETLY AFTER LUNCH WITHOUT ALCOHOL: WOULD NEVER DOZE
ESS TOTAL SCORE: 2
HOW LIKELY ARE YOU TO NOD OFF OR FALL ASLEEP WHILE LYING DOWN TO REST IN THE AFTERNOON WHEN CIRCUMSTANCES PERMIT: WOULD NEVER DOZE
HOW LIKELY ARE YOU TO NOD OFF OR FALL ASLEEP WHEN YOU ARE A PASSENGER IN A CAR FOR AN HOUR WITHOUT A BREAK: WOULD NEVER DOZE
HOW LIKELY ARE YOU TO NOD OFF OR FALL ASLEEP WHILE SITTING AND READING: SLIGHT CHANCE OF DOZING
HOW LIKELY ARE YOU TO NOD OFF OR FALL ASLEEP IN A CAR, WHILE STOPPED FOR A FEW MINUTES IN TRAFFIC: WOULD NEVER DOZE
HOW LIKELY ARE YOU TO NOD OFF OR FALL ASLEEP WHILE SITTING INACTIVE IN A PUBLIC PLACE: WOULD NEVER DOZE
HOW LIKELY ARE YOU TO NOD OFF OR FALL ASLEEP WHILE SITTING AND TALKING TO SOMEONE: WOULD NEVER DOZE
HOW LIKELY ARE YOU TO NOD OFF OR FALL ASLEEP WHILE WATCHING TV: SLIGHT CHANCE OF DOZING

## 2024-05-21 NOTE — PATIENT INSTRUCTIONS
Continue CPAP 11 cm with nightly compliance.  Change mask to F&P Vitera, size medium.    Reschedule follow-up pulmonary visit with Dr. Millan  Follow-up in sleep in 6 months or sooner if needed

## 2024-06-05 PROBLEM — I48.3 TYPICAL ATRIAL FLUTTER (HCC): Status: RESOLVED | Noted: 2021-08-12 | Resolved: 2024-06-05

## 2024-06-05 PROBLEM — I48.20 CHRONIC ATRIAL FIBRILLATION, UNSPECIFIED (HCC): Status: RESOLVED | Noted: 2021-08-12 | Resolved: 2024-06-05

## 2024-06-05 NOTE — PROGRESS NOTES
Antiadrenergic Antihypertensives       doxazosin (CARDURA) 8 MG tablet Take 1 tablet by mouth 2 times daily       Beta Blockers Cardio-Selective       metoprolol tartrate (LOPRESSOR) 25 MG tablet Take 0.5 tablets by mouth in the morning and 0.5 tablets in the evening.       HMG CoA Reductase Inhibitors       pravastatin (PRAVACHOL) 80 MG tablet Take 1 tablet by mouth daily       Salicylates       aspirin 81 MG EC tablet Take 1 tablet by mouth daily       Platelet Aggregation Inhibitors       clopidogrel (PLAVIX) 75 MG tablet Take 1 tablet by mouth daily       Direct Factor Xa Inhibitors       apixaban (ELIQUIS) 5 MG TABS tablet Take 1 tablet by mouth 2 times daily                      Past Medical History, Past Surgical History, Family history, Social History, and Medications were all reviewed with the patient today and updated as necessary.     Prior to Admission medications    Medication Sig Start Date End Date Taking? Authorizing Provider   apixaban (ELIQUIS) 5 MG TABS tablet Take 1 tablet by mouth 2 times daily 6/6/24  Yes Bailey Howard MD   ipratropium 0.5 mg-albuterol 2.5 mg (DUONEB) 0.5-2.5 (3) MG/3ML SOLN nebulizer solution Inhale 3 mLs into the lungs every 4 hours   Yes Provider, MD Nayla   pravastatin (PRAVACHOL) 80 MG tablet Take 1 tablet by mouth daily 4/5/24  Yes Ang Wren DO   potassium gluconate 550 mg tablet Take 1 tab BID 4/5/24  Yes Ang Wren DO   omeprazole (PRILOSEC) 20 MG delayed release capsule Take 2 capsules by mouth in the morning and at bedtime 4/5/24  Yes Ang Wren DO   doxazosin (CARDURA) 8 MG tablet Take 1 tablet by mouth 2 times daily 4/5/24 3/31/25 Yes Ang Wren DO   ferrous sulfate (IRON 325) 325 (65 Fe) MG tablet Take 1 tablet by mouth every morning (before breakfast) 4/5/24  Yes Ang Wren DO   lisinopril (PRINIVIL;ZESTRIL) 20 MG tablet Take 1 tablet by mouth daily Take 1 tablet by mouth twice a day for hypertension 4/5/24

## 2024-06-06 ENCOUNTER — OFFICE VISIT (OUTPATIENT)
Age: 79
End: 2024-06-06
Payer: MEDICARE

## 2024-06-06 VITALS
SYSTOLIC BLOOD PRESSURE: 122 MMHG | DIASTOLIC BLOOD PRESSURE: 74 MMHG | WEIGHT: 208 LBS | HEIGHT: 69 IN | HEART RATE: 60 BPM | BODY MASS INDEX: 30.81 KG/M2

## 2024-06-06 DIAGNOSIS — I10 ESSENTIAL HYPERTENSION WITH GOAL BLOOD PRESSURE LESS THAN 140/90: ICD-10-CM

## 2024-06-06 DIAGNOSIS — I25.118 CORONARY ARTERY DISEASE OF NATIVE ARTERY OF NATIVE HEART WITH STABLE ANGINA PECTORIS (HCC): Primary | ICD-10-CM

## 2024-06-06 DIAGNOSIS — I48.0 PAROXYSMAL ATRIAL FIBRILLATION (HCC): ICD-10-CM

## 2024-06-06 DIAGNOSIS — Z95.1 HX OF CABG: ICD-10-CM

## 2024-06-06 DIAGNOSIS — R06.02 SHORTNESS OF BREATH: ICD-10-CM

## 2024-06-06 DIAGNOSIS — E78.00 PURE HYPERCHOLESTEROLEMIA: ICD-10-CM

## 2024-06-06 PROCEDURE — 93000 ELECTROCARDIOGRAM COMPLETE: CPT | Performed by: INTERNAL MEDICINE

## 2024-06-06 PROCEDURE — 1123F ACP DISCUSS/DSCN MKR DOCD: CPT | Performed by: INTERNAL MEDICINE

## 2024-06-06 PROCEDURE — 99205 OFFICE O/P NEW HI 60 MIN: CPT | Performed by: INTERNAL MEDICINE

## 2024-06-06 PROCEDURE — G8417 CALC BMI ABV UP PARAM F/U: HCPCS | Performed by: INTERNAL MEDICINE

## 2024-06-06 PROCEDURE — G8427 DOCREV CUR MEDS BY ELIG CLIN: HCPCS | Performed by: INTERNAL MEDICINE

## 2024-06-06 PROCEDURE — 3074F SYST BP LT 130 MM HG: CPT | Performed by: INTERNAL MEDICINE

## 2024-06-06 PROCEDURE — 1036F TOBACCO NON-USER: CPT | Performed by: INTERNAL MEDICINE

## 2024-06-06 PROCEDURE — 3078F DIAST BP <80 MM HG: CPT | Performed by: INTERNAL MEDICINE

## 2024-06-07 DIAGNOSIS — R06.02 SHORTNESS OF BREATH: ICD-10-CM

## 2024-06-07 LAB
NT PRO BNP: 1578 PG/ML (ref 0–450)
TROPONIN T SERPL HS-MCNC: 26 NG/L (ref 0–22)

## 2024-06-10 LAB
KAPPA LC FREE SER-MCNC: 26.3 MG/L (ref 2.4–20.7)
KAPPA LC FREE/LAMBDA FREE SER: 1.6 (ref 0.2–0.8)
LAMBDA LC FREE SERPL-MCNC: 16.5 MG/L (ref 4.2–27.7)

## 2024-06-11 LAB
ALBUMIN 24H MFR UR ELPH: 32.1 %
ALPHA1 GLOB 24H MFR UR ELPH: 5.6 %
ALPHA2 GLOB 24H MFR UR ELPH: 21.4 %
B-GLOBULIN MFR UR ELPH: 22.1 %
GAMMA GLOB 24H MFR UR ELPH: 18.8 %
INTERPRETATION UR IFE-IMP: NORMAL
Lab: NORMAL
M PROTEIN 24H MFR UR ELPH: NORMAL %
PROT UR-MCNC: 20.1 MG/DL

## 2024-06-14 ENCOUNTER — TELEPHONE (OUTPATIENT)
Dept: PULMONOLOGY | Age: 79
End: 2024-06-14

## 2024-06-14 ENCOUNTER — FOLLOWUP TELEPHONE ENCOUNTER (OUTPATIENT)
Dept: PULMONOLOGY | Age: 79
End: 2024-06-14

## 2024-06-14 LAB
ALBUMIN SERPL ELPH-MCNC: 3.6 G/DL (ref 2.9–4.4)
ALBUMIN/GLOB SERPL: 1.3 (ref 0.7–1.7)
ALPHA1 GLOB SERPL ELPH-MCNC: 0.2 G/DL (ref 0–0.4)
ALPHA2 GLOB SERPL ELPH-MCNC: 0.7 G/DL (ref 0.4–1)
B-GLOBULIN SERPL ELPH-MCNC: 0.7 G/DL (ref 0.7–1.3)
GAMMA GLOB SERPL ELPH-MCNC: 1.2 G/DL (ref 0.4–1.8)
GLOBULIN SER-MCNC: 2.8 G/DL (ref 2.2–3.9)
IGA SERPL-MCNC: 149 MG/DL (ref 61–437)
IGG SERPL-MCNC: 1314 MG/DL (ref 603–1613)
IGM SERPL-MCNC: 38 MG/DL (ref 15–143)
INTERPRETATION SERPL IEP-IMP: NORMAL
M PROTEIN SERPL ELPH-MCNC: NORMAL G/DL
PROT SERPL-MCNC: 6.4 G/DL (ref 6–8.5)

## 2024-06-14 NOTE — PROGRESS NOTES
Spoke with Breztri drug rep to attempt to get info on why inhaler with his insurance so expensive.  She is wanting to go to pharmacy to see if there is a way to refile or see if filed correctly, but needs pharmacy info to do this.  Called patient to ask which pharmacy, but no answer and left a message for return call with pharmacy info.

## 2024-06-14 NOTE — TELEPHONE ENCOUNTER
Patient is returning call for Ms. Aliza Butler, I relayed the message from Ms. Butler (Spoke with Breztri drug rep to attempt to get info on why inhaler with his insurance so expensive, especially if listed as tier 3 medication.  She is wanting to go to pharmacy to see if there is a way to refile or see if filed correctly, but needs pharmacy info to do this.  Called patient to ask which pharmacy, but no answer and left a message for return call with pharmacy info. ) [facility] is [facility] in [address] [place] [address] ,Phone: [phone] . Patient is very upset about the Breztri issue that never been fix since 4/2024. Give pt 2 bxs of sample, left in the  for him to . Jesus RIOSA

## 2024-06-19 NOTE — TELEPHONE ENCOUNTER
Pharmacy information passed on to the [facility] to follow-up on cost of the inhaler.

## 2024-06-20 RX ORDER — BUDESONIDE, GLYCOPYRROLATE, AND FORMOTEROL FUMARATE 160; 9; 4.8 UG/1; UG/1; UG/1
2 AEROSOL, METERED RESPIRATORY (INHALATION) 2 TIMES DAILY
Qty: 1 EACH | Refills: 11 | Status: SHIPPED | OUTPATIENT
Start: 2024-06-20

## 2024-06-21 NOTE — TELEPHONE ENCOUNTER
Contacted patient regarding his Breztri inhaler  relayed message from Ms. Aliza Butler , patient was able to get Breztri for $0 and it will  be shipped to him through [employer] [facility]. Patient voices understanding and appreciated to Ms. Butler for doing this. Jesus MAZARIEGOS

## 2024-06-28 ENCOUNTER — TELEPHONE (OUTPATIENT)
Age: 79
End: 2024-06-28

## 2024-06-28 DIAGNOSIS — R06.02 SHORTNESS OF BREATH: Primary | ICD-10-CM

## 2024-06-28 DIAGNOSIS — I25.118 CORONARY ARTERY DISEASE OF NATIVE ARTERY OF NATIVE HEART WITH STABLE ANGINA PECTORIS (HCC): ICD-10-CM

## 2024-06-28 RX ORDER — NITROGLYCERIN 0.4 MG/1
0.4 TABLET SUBLINGUAL EVERY 5 MIN PRN
Qty: 25 TABLET | Refills: 3 | Status: SHIPPED | OUTPATIENT
Start: 2024-06-28

## 2024-06-28 RX ORDER — AMLODIPINE BESYLATE 2.5 MG/1
2.5 TABLET ORAL DAILY
Qty: 30 TABLET | Refills: 3 | Status: SHIPPED | OUTPATIENT
Start: 2024-06-28

## 2024-06-28 NOTE — TELEPHONE ENCOUNTER
Pt given information from Dr. Romeo, he would like for me to write down the information and come to the office to get the information in writing (pt does not have MyChart, & states he does not know how to communicate using latest technology).     Also he tells me at last office visit with Dr. Romeo on 6/6/24, she prescribed eliquis 5 mg po BID for afib and he can't afford it. Pt has part D, however he has not meet his deductible and the cost was going to be > $700. I told pt I would get him some samples together and when he comes to the office later today I will give him information about [facility].              Nuclear Stress Test:   Bailey Howard MD  6/27/2024  7:29 AM EDT Back to Top      Please notify patient possible abnormalities, can be difficult to tell with COPD.  Until follow up in a few weeks, please add amlodipine 2.5 mg once daily to his current regimen.  Make sure he has NTG on board and instructions to use it.  If there is room anywhere to move up his appointment that would be ok.

## 2024-06-28 NOTE — TELEPHONE ENCOUNTER
Nuclear Stress Test Results  From Dr. Romeo    Your recent Nuclear Stress Test on 6/25/24 showed possible abnormalities, however given your diagnosis of COPD, it can be difficult to tell. Please add amlodipine 2.5 mg once daily to your current medication regimen. Also, I will send in Nitroglycerin tablets to use as needed to be placed under your tongue as needed at the onset of chest pain. The instructions on how to take Nitroglycerin tablets sublingual (which means under your tongue) is listed as follows:     1) On the onset of chest pain, take 1 tablet & place under your tongue, if chest pain is relieved - you do not need to take another tablet.     2) If chest pain is unrelieved after five minutes - place another tablet under your tongue, if the pain is relieved - do not take another tablet.     3) If the pain is unrelieved after five minutes, - place another tablet under your tongue, and call 911.         The maximum dose is three tablets

## 2024-07-19 ENCOUNTER — TELEPHONE (OUTPATIENT)
Age: 79
End: 2024-07-19

## 2024-07-19 NOTE — TELEPHONE ENCOUNTER
MEDICATION REFILL REQUEST      Name of Medication:  Apixaban   Dose:  5 mg  Frequency:  twice a day   Quantity:    Days' supply:  90      Pharmacy Name/Location:  [facility]/ Acct # [id number] Order # [id number]

## 2024-07-19 NOTE — TELEPHONE ENCOUNTER
Pt is calling returning a missed call,he is wanting nurse to call him back but he will be leaving in about 15 min.

## 2024-07-23 NOTE — PROGRESS NOTES
subclavian).  3.0 x 12 Clem stents x 2 to mid and distal native Cx  Jan 2024       GINNA - on CPAP  Jun/Jul 2024       Sustained, generally rate controlled, atrial fib. Average rate 78, rarely to 130. No diary entries        NST - EF 50%, reversible anterior and inferolateral defects, high risk       Echo - EF 55-60%, mild LVH, abn DF, RVSP 25                        Cardiac Medications       ACE Inhibitors       lisinopril (PRINIVIL;ZESTRIL) 20 MG tablet Take 1 tablet by mouth daily Take 1 tablet by mouth twice a day for hypertension       Calcium Channel Blockers       amLODIPine (NORVASC) 2.5 MG tablet Take 1 tablet by mouth daily       Antiadrenergic Antihypertensives       doxazosin (CARDURA) 8 MG tablet Take 1 tablet by mouth 2 times daily       Nitrates       nitroGLYCERIN (NITROSTAT) 0.4 MG SL tablet Place 1 tablet under the tongue every 5 minutes as needed for Chest pain up to max of 3 total doses. If no relief after 1 dose, call 911.       Beta Blockers Cardio-Selective       metoprolol tartrate (LOPRESSOR) 25 MG tablet Take 0.5 tablets by mouth in the morning and 0.5 tablets in the evening.       Loop Diuretics       furosemide (LASIX) 20 MG tablet Take 1 tablet by mouth daily       HMG CoA Reductase Inhibitors       pravastatin (PRAVACHOL) 80 MG tablet Take 1 tablet by mouth daily       Salicylates       aspirin 81 MG EC tablet Take 1 tablet by mouth daily       Platelet Aggregation Inhibitors       clopidogrel (PLAVIX) 75 MG tablet Take 1 tablet by mouth daily       Direct Factor Xa Inhibitors       apixaban (ELIQUIS) 5 MG TABS tablet Take 1 tablet by mouth 2 times daily              Past Medical History, Past Surgical History, Family history, Social History, and Medications were all reviewed with the patient today and updated as necessary.     Prior to Admission medications    Medication Sig Start Date End Date Taking? Authorizing Provider   furosemide (LASIX) 20 MG tablet Take 1 tablet by mouth daily

## 2024-07-24 ENCOUNTER — OFFICE VISIT (OUTPATIENT)
Age: 79
End: 2024-07-24
Payer: MEDICARE

## 2024-07-24 VITALS
BODY MASS INDEX: 30.96 KG/M2 | HEART RATE: 70 BPM | DIASTOLIC BLOOD PRESSURE: 84 MMHG | HEIGHT: 69 IN | SYSTOLIC BLOOD PRESSURE: 142 MMHG | WEIGHT: 209 LBS

## 2024-07-24 DIAGNOSIS — I20.0 ACCELERATING ANGINA (HCC): ICD-10-CM

## 2024-07-24 DIAGNOSIS — E87.1 HYPONATREMIA: Primary | ICD-10-CM

## 2024-07-24 DIAGNOSIS — I20.0 ACCELERATING ANGINA (HCC): Primary | ICD-10-CM

## 2024-07-24 LAB
ANION GAP SERPL CALC-SCNC: 10 MMOL/L (ref 9–18)
BUN SERPL-MCNC: 16 MG/DL (ref 8–23)
CALCIUM SERPL-MCNC: 8.8 MG/DL (ref 8.8–10.2)
CHLORIDE SERPL-SCNC: 94 MMOL/L (ref 98–107)
CO2 SERPL-SCNC: 25 MMOL/L (ref 20–28)
CREAT SERPL-MCNC: 0.78 MG/DL (ref 0.8–1.3)
ERYTHROCYTE [DISTWIDTH] IN BLOOD BY AUTOMATED COUNT: 14 % (ref 11.9–14.6)
GLUCOSE SERPL-MCNC: 77 MG/DL (ref 70–99)
HCT VFR BLD AUTO: 37.8 % (ref 41.1–50.3)
HGB BLD-MCNC: 12.3 G/DL (ref 13.6–17.2)
MCH RBC QN AUTO: 30 PG (ref 26.1–32.9)
MCHC RBC AUTO-ENTMCNC: 32.5 G/DL (ref 31.4–35)
MCV RBC AUTO: 92.2 FL (ref 82–102)
NRBC # BLD: 0 K/UL (ref 0–0.2)
PLATELET # BLD AUTO: 148 K/UL (ref 150–450)
PMV BLD AUTO: 9 FL (ref 9.4–12.3)
POTASSIUM SERPL-SCNC: 3.9 MMOL/L (ref 3.5–5.1)
RBC # BLD AUTO: 4.1 M/UL (ref 4.23–5.6)
SODIUM SERPL-SCNC: 129 MMOL/L (ref 136–145)
WBC # BLD AUTO: 4.1 K/UL (ref 4.3–11.1)

## 2024-07-24 PROCEDURE — 1036F TOBACCO NON-USER: CPT | Performed by: INTERNAL MEDICINE

## 2024-07-24 PROCEDURE — 1123F ACP DISCUSS/DSCN MKR DOCD: CPT | Performed by: INTERNAL MEDICINE

## 2024-07-24 PROCEDURE — 3079F DIAST BP 80-89 MM HG: CPT | Performed by: INTERNAL MEDICINE

## 2024-07-24 PROCEDURE — 99214 OFFICE O/P EST MOD 30 MIN: CPT | Performed by: INTERNAL MEDICINE

## 2024-07-24 PROCEDURE — 3077F SYST BP >= 140 MM HG: CPT | Performed by: INTERNAL MEDICINE

## 2024-07-24 PROCEDURE — G8427 DOCREV CUR MEDS BY ELIG CLIN: HCPCS | Performed by: INTERNAL MEDICINE

## 2024-07-24 PROCEDURE — G8417 CALC BMI ABV UP PARAM F/U: HCPCS | Performed by: INTERNAL MEDICINE

## 2024-07-24 RX ORDER — LORAZEPAM 0.5 MG/1
0.5 TABLET ORAL
OUTPATIENT
Start: 2024-07-24 | End: 2024-07-25

## 2024-07-24 RX ORDER — NITROGLYCERIN 0.4 MG/1
0.4 TABLET SUBLINGUAL EVERY 5 MIN PRN
OUTPATIENT
Start: 2024-07-24

## 2024-07-24 RX ORDER — SODIUM CHLORIDE 0.9 % (FLUSH) 0.9 %
5-40 SYRINGE (ML) INJECTION PRN
OUTPATIENT
Start: 2024-07-24

## 2024-07-24 RX ORDER — FUROSEMIDE 20 MG/1
20 TABLET ORAL DAILY
Qty: 90 TABLET | Refills: 3 | Status: SHIPPED | OUTPATIENT
Start: 2024-07-24

## 2024-07-24 RX ORDER — POTASSIUM CHLORIDE 750 MG/1
10 TABLET, EXTENDED RELEASE ORAL DAILY
Qty: 90 TABLET | Refills: 1 | Status: SHIPPED | OUTPATIENT
Start: 2024-07-24

## 2024-07-24 RX ORDER — SODIUM CHLORIDE 9 MG/ML
INJECTION, SOLUTION INTRAVENOUS PRN
OUTPATIENT
Start: 2024-07-24

## 2024-07-24 RX ORDER — DIPHENHYDRAMINE HYDROCHLORIDE 50 MG/ML
50 INJECTION INTRAMUSCULAR; INTRAVENOUS ONCE
OUTPATIENT
Start: 2024-07-24 | End: 2024-07-24

## 2024-07-24 RX ORDER — SODIUM CHLORIDE 0.9 % (FLUSH) 0.9 %
5-40 SYRINGE (ML) INJECTION EVERY 12 HOURS SCHEDULED
OUTPATIENT
Start: 2024-07-24

## 2024-07-24 RX ORDER — ONDANSETRON 2 MG/ML
4 INJECTION INTRAMUSCULAR; INTRAVENOUS EVERY 6 HOURS PRN
OUTPATIENT
Start: 2024-07-24

## 2024-07-24 ASSESSMENT — ENCOUNTER SYMPTOMS: SHORTNESS OF BREATH: 1

## 2024-07-24 NOTE — RESULT ENCOUNTER NOTE
His sodium level is a little low.  I think this could be due to some volume overload and just started low dose lasix today. Pulling off extra fluid should help improve this but we need to be careful and make sure.   Please have him repeat this on 7/30, one day prior to his scheduled cath. Thanks. I entered the order with the specifics on the date.

## 2024-07-26 ENCOUNTER — TELEPHONE (OUTPATIENT)
Age: 79
End: 2024-07-26

## 2024-07-26 NOTE — TELEPHONE ENCOUNTER
----- Message from Bailey Howard MD sent at 7/24/2024  3:22 PM EDT -----  His sodium level is a little low.  I think this could be due to some volume overload and just started low dose lasix today. Pulling off extra fluid should help improve this but we need to be careful and make sure.   Please have him repeat this on 7/30, one day prior to his scheduled cath. Thanks. I entered the order with the specifics on the date.

## 2024-07-30 DIAGNOSIS — E87.1 HYPONATREMIA: ICD-10-CM

## 2024-07-30 LAB
ANION GAP SERPL CALC-SCNC: 9 MMOL/L (ref 9–18)
BUN SERPL-MCNC: 13 MG/DL (ref 8–23)
CALCIUM SERPL-MCNC: 8.8 MG/DL (ref 8.8–10.2)
CHLORIDE SERPL-SCNC: 97 MMOL/L (ref 98–107)
CO2 SERPL-SCNC: 27 MMOL/L (ref 20–28)
CREAT SERPL-MCNC: 0.74 MG/DL (ref 0.8–1.3)
GLUCOSE SERPL-MCNC: 91 MG/DL (ref 70–99)
POTASSIUM SERPL-SCNC: 4.1 MMOL/L (ref 3.5–5.1)
SODIUM SERPL-SCNC: 132 MMOL/L (ref 136–145)

## 2024-07-30 NOTE — PROGRESS NOTES
Patient pre-assessment complete for [facility] scheduled for 930, arrival time 0730. Patient verified using . Patient instructed to bring a list of all home medications on the day of procedure. NPO status reinforced. Patient informed to take a full dose aspirin 325mg  or 81 mg x 4 on the day of procedure and to take plavix. Patient instructed to HOLD eliquis and lasix. Instructed they can take all other medications excluding vitamins & supplements. Patient verbalizes understanding of all instructions & denies any questions at this time.

## 2024-07-31 ENCOUNTER — HOSPITAL ENCOUNTER (OUTPATIENT)
Age: 79
Setting detail: OUTPATIENT SURGERY
Discharge: HOME | End: 2024-07-31
Attending: INTERNAL MEDICINE | Admitting: INTERNAL MEDICINE
Payer: MEDICARE

## 2024-07-31 VITALS
RESPIRATION RATE: 16 BRPM | BODY MASS INDEX: 30.96 KG/M2 | DIASTOLIC BLOOD PRESSURE: 80 MMHG | HEIGHT: 69 IN | WEIGHT: 209 LBS | TEMPERATURE: 97.9 F | HEART RATE: 80 BPM | SYSTOLIC BLOOD PRESSURE: 124 MMHG | OXYGEN SATURATION: 98 %

## 2024-07-31 DIAGNOSIS — I20.0 ACCELERATING ANGINA (HCC): ICD-10-CM

## 2024-07-31 LAB
ECHO BSA: 2.15 M2
EKG ATRIAL RATE: 69 BPM
EKG DIAGNOSIS: NORMAL
EKG P AXIS: 32 DEGREES
EKG P-R INTERVAL: 184 MS
EKG Q-T INTERVAL: 408 MS
EKG QRS DURATION: 150 MS
EKG QTC CALCULATION (BAZETT): 437 MS
EKG R AXIS: -16 DEGREES
EKG T AXIS: 67 DEGREES
EKG VENTRICULAR RATE: 69 BPM

## 2024-07-31 PROCEDURE — 93455 CORONARY ART/GRFT ANGIO S&I: CPT | Performed by: INTERNAL MEDICINE

## 2024-07-31 PROCEDURE — 6360000002 HC RX W HCPCS: Performed by: INTERNAL MEDICINE

## 2024-07-31 PROCEDURE — C1769 GUIDE WIRE: HCPCS | Performed by: INTERNAL MEDICINE

## 2024-07-31 PROCEDURE — 2500000003 HC RX 250 WO HCPCS: Performed by: INTERNAL MEDICINE

## 2024-07-31 PROCEDURE — C1887 CATHETER, GUIDING: HCPCS | Performed by: INTERNAL MEDICINE

## 2024-07-31 PROCEDURE — 93010 ELECTROCARDIOGRAM REPORT: CPT | Performed by: INTERNAL MEDICINE

## 2024-07-31 PROCEDURE — C1894 INTRO/SHEATH, NON-LASER: HCPCS | Performed by: INTERNAL MEDICINE

## 2024-07-31 PROCEDURE — 2709999900 HC NON-CHARGEABLE SUPPLY: Performed by: INTERNAL MEDICINE

## 2024-07-31 PROCEDURE — 99153 MOD SED SAME PHYS/QHP EA: CPT | Performed by: INTERNAL MEDICINE

## 2024-07-31 PROCEDURE — 99152 MOD SED SAME PHYS/QHP 5/>YRS: CPT | Performed by: INTERNAL MEDICINE

## 2024-07-31 PROCEDURE — 6360000004 HC RX CONTRAST MEDICATION: Performed by: INTERNAL MEDICINE

## 2024-07-31 PROCEDURE — 93005 ELECTROCARDIOGRAM TRACING: CPT | Performed by: INTERNAL MEDICINE

## 2024-07-31 RX ORDER — HEPARIN SODIUM 200 [USP'U]/100ML
INJECTION, SOLUTION INTRAVENOUS CONTINUOUS PRN
Status: DISCONTINUED | OUTPATIENT
Start: 2024-07-31 | End: 2024-07-31 | Stop reason: HOSPADM

## 2024-07-31 RX ORDER — SODIUM CHLORIDE 9 MG/ML
INJECTION, SOLUTION INTRAVENOUS CONTINUOUS
Status: DISCONTINUED | OUTPATIENT
Start: 2024-07-31 | End: 2024-07-31 | Stop reason: HOSPADM

## 2024-07-31 RX ORDER — LIDOCAINE HYDROCHLORIDE 10 MG/ML
INJECTION, SOLUTION INFILTRATION; PERINEURAL PRN
Status: DISCONTINUED | OUTPATIENT
Start: 2024-07-31 | End: 2024-07-31 | Stop reason: HOSPADM

## 2024-07-31 RX ORDER — ASPIRIN 81 MG/1
324 TABLET, CHEWABLE ORAL ONCE
Status: CANCELLED | OUTPATIENT
Start: 2024-07-31 | End: 2024-07-31

## 2024-07-31 RX ORDER — MIDAZOLAM HYDROCHLORIDE 1 MG/ML
INJECTION INTRAMUSCULAR; INTRAVENOUS PRN
Status: DISCONTINUED | OUTPATIENT
Start: 2024-07-31 | End: 2024-07-31 | Stop reason: HOSPADM

## 2024-07-31 NOTE — PROGRESS NOTES
Discharge instructions given. TR band removed from left wrist with no bleeding or swelling noted to site. 4x4 gauze and tegaderm applied. Brother at bedside.

## 2024-07-31 NOTE — PROGRESS NOTES
Report received from [facility] RN. Procedural findings communicated. Intra procedural  medication administration reviewed. Progression of care discussed.     Patient received into CPRU [place] 7 post sheath removal.     Access site without bleeding or swelling yes    Dressing dry and intact yes    Patient instructed to limit movement to left upper extremity    Routine post procedural vital signs and site assessment initiated yes

## 2024-07-31 NOTE — PROGRESS NOTES
Patient received to CPRU room # 14  Ambulatory from [facility]. Patient scheduled for LHC today with Dr Chino. Procedure reviewed & questions answered, voiced good understanding consent obtained & placed on chart. All medications and medical history reviewed. Will prep patient per orders. Patient & family updated on plan of care.      The patient has a fraility score of 4-VULNERABLE, based on ambulation.     Patient took Aspirin 324 today at 0530 prior to arrival.

## 2024-07-31 NOTE — DISCHARGE INSTRUCTIONS
HEART CATHETERIZATION/ANGIOGRAPHY DISCHARGE INSTRUCTIONS    Check puncture site frequently for swelling or bleeding. If there is any bleeding, apply pressure over the area with a clean towel or washcloth and call 911. Notify your doctor for any redness, swelling, drainage, or oozing from the puncture site. Notify your doctor for any fever or chills.  If the extremity becomes cold, numb, or painful call [facility] CARDIOLOGY at [phone].  Activity should be limited for the next 48 hours. No heavy lifting, pushing, pulling  or strenuous activity for 48 hours. No heavy lifting (anything over 10 pounds) for 3 days.  You may resume your usual diet. Drink more fluids than usual.  Have a responsible person drive you home and stay with you for at least 24 hours after your heart catheterization/angiography.  You may remove bandage from your LEFT WRIST in 24 hours. You may shower in 24 hours. No tub baths, hot tubs, or swimming for 1 week. Do not place any lotions, creams, powders, or ointments over puncture site for 1 week. You may place a clean band-aid over the puncture site each day for 5 days. Change daily.        Sedation for a Medical Procedure: Care Instructions     You were given a sedative medication during your visit. While many of the effects will have worn   off before you leave; you may continue to feel some effects for several hours.      Common side effects from sedation include:  Feeling sleepy. (Your doctors and nurses will make sure you are not too sleepy to go home.)  Nausea and vomiting. This usually does not last long.  Feeling tired.     How can you care for yourself at home?  Activity    Don't do anything for 24 hours that requires attention to detail. It takes time for the medicine effects to completely wear off.     Do not make important legal decisions for 24 hours.     Do not sign any legal documents for 24 hours.     Do not drink alcohol today     For your safety, you should not drive or operate

## 2024-07-31 NOTE — PROGRESS NOTES
LHC with Dr Chino  Access: left radial  Closure: left radial band, 12 ml in band  No intervention    MAR:  2 Versed  5000 units Heparin  50 mcg Fentanyl    Report given to Wanda BAUM in [facility]

## 2024-08-16 PROBLEM — I20.0 ACCELERATING ANGINA (HCC): Status: RESOLVED | Noted: 2024-07-24 | Resolved: 2024-08-16

## 2024-08-16 NOTE — PROGRESS NOTES
[facility] CARDIOLOGY  [address] [place] [address], [address]  [place] [address]  PHONE: [phone]      24    NAME:  Tadeo Griffiths Sr.  : 1945  MRN: [id number]         SUBJECTIVE:   Tadeo Griffiths Sr. is a 79 y.o. male seen for a follow up visit regarding the following:     Chief Complaint   Patient presents with    Coronary Artery Disease    Accelerating angina            HPI:  Follow up  Coronary Artery Disease and Accelerating angina   .    Follow up CAD , prior CABG, post op atrial flutter s/p ablation, EP stopped AC at that time, noted to be in afib at visit in 2024 following a prolonged absence from cardiology care.  Multifactorial and enigmatic dyspnea did not respond at all to intracoronary stent.  Chronic left hemidiaphragm elevation, hormone therapy for prostate cancer, sees pulmonary with severe COPD, GINNA now on CPAP with good compliance as of 2024. Chronic ankle edema, peripheral neuropathy and progressively worsening dyspnea    Initiated workup for possible infiltrative process along with ischemic workup.  Nuclear perfusion study showed reversible anterior and inferior defect which would suggest high risk study.  EF normal but with elevated bnp and ongoing dyspnea will plan to add loop diuretic.     He feels the same with chronic dyspnea, mild ankle edema, fatigue.     Infiltrative workup below, some features but not conclusive.     LHC showed 2/2 patent CASSI grafts.  Trial  loop diuretic added last visit prior to cath, but he says this never made any difference.  His symptoms are about the same.  Following up with pulmonary soon.  Trying to walk for some exercise, mostly at [facility].  Cites bilateral leg pain limiting him.  Mostly in the calves, bilateral.  Known right peroneal artery occlusion in .  No longer sees vascular.  Some nighttime pain, has to get up and put legs down to feel better.          CLINICAL FACTORS SUGGESTING AMYLOID    HFpEF        Unknown =

## 2024-08-19 ENCOUNTER — OFFICE VISIT (OUTPATIENT)
Age: 79
End: 2024-08-19
Payer: MEDICARE

## 2024-08-19 VITALS
BODY MASS INDEX: 30.51 KG/M2 | HEART RATE: 70 BPM | WEIGHT: 206 LBS | SYSTOLIC BLOOD PRESSURE: 138 MMHG | HEIGHT: 69 IN | DIASTOLIC BLOOD PRESSURE: 80 MMHG

## 2024-08-19 DIAGNOSIS — I73.9 PERIPHERAL ARTERY DISEASE (HCC): ICD-10-CM

## 2024-08-19 DIAGNOSIS — Z95.1 HX OF CABG: ICD-10-CM

## 2024-08-19 DIAGNOSIS — I48.0 PAROXYSMAL ATRIAL FIBRILLATION (HCC): ICD-10-CM

## 2024-08-19 DIAGNOSIS — J43.1 PANLOBULAR EMPHYSEMA (HCC): ICD-10-CM

## 2024-08-19 DIAGNOSIS — E78.00 PURE HYPERCHOLESTEROLEMIA: ICD-10-CM

## 2024-08-19 DIAGNOSIS — J98.6 ACQUIRED ELEVATED DIAPHRAGM: ICD-10-CM

## 2024-08-19 DIAGNOSIS — I25.118 CORONARY ARTERY DISEASE OF NATIVE ARTERY OF NATIVE HEART WITH STABLE ANGINA PECTORIS (HCC): Primary | ICD-10-CM

## 2024-08-19 PROCEDURE — G8417 CALC BMI ABV UP PARAM F/U: HCPCS | Performed by: INTERNAL MEDICINE

## 2024-08-19 PROCEDURE — 1123F ACP DISCUSS/DSCN MKR DOCD: CPT | Performed by: INTERNAL MEDICINE

## 2024-08-19 PROCEDURE — 3075F SYST BP GE 130 - 139MM HG: CPT | Performed by: INTERNAL MEDICINE

## 2024-08-19 PROCEDURE — G8427 DOCREV CUR MEDS BY ELIG CLIN: HCPCS | Performed by: INTERNAL MEDICINE

## 2024-08-19 PROCEDURE — 3078F DIAST BP <80 MM HG: CPT | Performed by: INTERNAL MEDICINE

## 2024-08-19 PROCEDURE — 3023F SPIROM DOC REV: CPT | Performed by: INTERNAL MEDICINE

## 2024-08-19 PROCEDURE — 1036F TOBACCO NON-USER: CPT | Performed by: INTERNAL MEDICINE

## 2024-08-19 PROCEDURE — 99214 OFFICE O/P EST MOD 30 MIN: CPT | Performed by: INTERNAL MEDICINE

## 2024-08-19 ASSESSMENT — ENCOUNTER SYMPTOMS: SHORTNESS OF BREATH: 1

## 2024-08-29 ENCOUNTER — OFFICE VISIT (OUTPATIENT)
Dept: PULMONOLOGY | Age: 79
End: 2024-08-29
Payer: MEDICARE

## 2024-08-29 VITALS
BODY MASS INDEX: 30.07 KG/M2 | DIASTOLIC BLOOD PRESSURE: 80 MMHG | HEART RATE: 73 BPM | HEIGHT: 69 IN | TEMPERATURE: 97.3 F | WEIGHT: 203 LBS | OXYGEN SATURATION: 100 % | RESPIRATION RATE: 18 BRPM | SYSTOLIC BLOOD PRESSURE: 124 MMHG

## 2024-08-29 DIAGNOSIS — J98.6 ELEVATED HEMIDIAPHRAGM: ICD-10-CM

## 2024-08-29 DIAGNOSIS — M79.89 LEG SWELLING: ICD-10-CM

## 2024-08-29 DIAGNOSIS — G47.33 OSA (OBSTRUCTIVE SLEEP APNEA): ICD-10-CM

## 2024-08-29 DIAGNOSIS — J44.9 CHRONIC OBSTRUCTIVE PULMONARY DISEASE, UNSPECIFIED COPD TYPE (HCC): Primary | ICD-10-CM

## 2024-08-29 PROCEDURE — G2211 COMPLEX E/M VISIT ADD ON: HCPCS | Performed by: INTERNAL MEDICINE

## 2024-08-29 PROCEDURE — 3074F SYST BP LT 130 MM HG: CPT | Performed by: INTERNAL MEDICINE

## 2024-08-29 PROCEDURE — G8417 CALC BMI ABV UP PARAM F/U: HCPCS | Performed by: INTERNAL MEDICINE

## 2024-08-29 PROCEDURE — G8427 DOCREV CUR MEDS BY ELIG CLIN: HCPCS | Performed by: INTERNAL MEDICINE

## 2024-08-29 PROCEDURE — 99215 OFFICE O/P EST HI 40 MIN: CPT | Performed by: INTERNAL MEDICINE

## 2024-08-29 PROCEDURE — 1036F TOBACCO NON-USER: CPT | Performed by: INTERNAL MEDICINE

## 2024-08-29 PROCEDURE — 1123F ACP DISCUSS/DSCN MKR DOCD: CPT | Performed by: INTERNAL MEDICINE

## 2024-08-29 PROCEDURE — 3079F DIAST BP 80-89 MM HG: CPT | Performed by: INTERNAL MEDICINE

## 2024-08-29 PROCEDURE — 3023F SPIROM DOC REV: CPT | Performed by: INTERNAL MEDICINE

## 2024-08-29 RX ORDER — FLUTICASONE PROPIONATE AND SALMETEROL XINAFOATE 115; 21 UG/1; UG/1
2 AEROSOL, METERED RESPIRATORY (INHALATION) 2 TIMES DAILY
Qty: 1 EACH | Refills: 3 | Status: SHIPPED | OUTPATIENT
Start: 2024-08-29

## 2024-08-29 RX ORDER — FLUTICASONE FUROATE AND VILANTEROL 100; 25 UG/1; UG/1
1 POWDER RESPIRATORY (INHALATION) DAILY
Qty: 1 EACH | Refills: 3 | Status: CANCELLED | OUTPATIENT
Start: 2024-08-29

## 2024-08-29 RX ORDER — FLUTICASONE FUROATE, UMECLIDINIUM BROMIDE AND VILANTEROL TRIFENATATE 100; 62.5; 25 UG/1; UG/1; UG/1
1 POWDER RESPIRATORY (INHALATION) DAILY
Qty: 1 EACH | Refills: 11 | Status: CANCELLED | OUTPATIENT
Start: 2024-08-29

## 2024-08-29 NOTE — PROGRESS NOTES
Name:  Tadeo Griffiths Sr.  YOB: 1945   MRN: [id number]      Office Visit: 8/29/2024        ASSESSMENT AND PLAN:  (Medical Decision Making)    Impression: 77-year-old male with mild remote smoking history, COPD, elevated right diaphragm, sleep apnea.  Today follow-up visit.    1. Dyspnea on exertion  ---I told him this is likely from diaphragmatic elevation  - Has had ambulatory saturation in the past that was unremarkable.  - This is not any worse than baseline.      2. Chronic obstructive pulmonary disease, unspecified COPD type (HCC)  ---I wrote him 2 prescriptions he can check with his pharmacy and what is available.  And Aliza spent a lot of time doing this last time and he is tier 3 on all of them.  - She again printed prescription for Breztri and gave him samples.  She tells me she sent in the request and the representative even told her she can get it for a whole year.    -Using oxygen nightly    3. Elevated hemidiaphragm  --Noted since at least 2018.  Has had surgery with no improvement  -- Now on CPAP and positive airway pressures up and nightly but having some secretions  -Compliance data with CPAP does not seem ideal and I told him he needs to work on this.  Told him to bring his mask next time and we could see how it fits on him, since having marked air leaks  -Spent a lot of time talking about the mechanics of water elevated diaphragm causes in terms of dyspnea with change in body position since he was having increased symptoms when he would bend down.    4. Former tobacco use  --Patient with a very minimal smoking history.    Many questions and this becomes a long visit.  I had to spend so far over 45 minutes talking to him    Orders Placed This Encounter   Medications    fluticasone-salmeterol (ADVAIR HFA) 115-21 MCG/ACT inhaler     Sig: Inhale 2 puffs into the lungs 2 times daily     Dispense:  1 each     Refill:  3       No orders of the defined types were placed in this  walk to flat surface.  Lowest oxygen saturation was 93% and highest heart rate was 85.  This was a negative exercise oximetry    Echo: ECHO (TTE) COMPLETE (PRN CONTRAST/BUBBLE/STRAIN/3D) 07/16/2024    Interpretation Summary    Left Ventricle: Normal left ventricular systolic function with a visually estimated EF of 55 - 60%. Left ventricle size is normal. Mildly increased wall thickness. Normal wall motion. Abnormal diastolic function.    Tricuspid Valve: Mild regurgitation. The estimated RVSP is 25 mmHg.    Image quality is fair.      [facility] Reference Info:                                                                                                                Exposure History:  Second Hand Smoke Exposure: Yes  Birds: No  Asbestos: No  TB: No  Hot Tubs/Humidifier: No  Organic/Inorganic Dusts: No  Molds: No  Occupation/Hobbies:   Past Medical History:   Diagnosis Date    Aspirin long-term use     CAD (coronary artery disease)     mi--2/2015 cabg 2/2015--- STENT X 2---2/2021-- followed by dr hankins    Cancer ([facility]) 2021    prostate---radiation only    COPD (chronic obstructive pulmonary disease) ([facility])     Elevated hemidiaphragm 2/21/2015 2/20 barium swallow on 2/12/15 that revealed an unremarkable esophagus and paralyzed left diaphragm with an unusual rotation of the stomach in the left upper quadrant  esophagram 2/12/2015 which revealed an abnormal contour the left hemidiaphragm, suggesting diaphragmatic hernia.  2/20 Barium Swallow Barium swallow today to assess for any signs of possible torsion. If no torsion present, will likel    Elevated hemoglobin A1c 2/20/2015    6.1 - 2/17/15     Full dentures 2/21/2015    GERD (gastroesophageal reflux disease)     controlled with med    Hernia of abdominal cavity 6/26/2015    Hernia, incisional     History of atrial flutter 8/2015    ablation--- followed by dr hankins    History of complete [facility] exam 01[facility]01/2015    [facility]    Hypercholesterolemia     Hyperlipidemia

## 2024-09-05 ENCOUNTER — TELEPHONE (OUTPATIENT)
Dept: PULMONOLOGY | Age: 79
End: 2024-09-05

## 2024-09-05 RX ORDER — FLUTICASONE PROPIONATE AND SALMETEROL XINAFOATE 115; 21 UG/1; UG/1
2 AEROSOL, METERED RESPIRATORY (INHALATION) 2 TIMES DAILY
Qty: 1 EACH | Refills: 3 | Status: SHIPPED | OUTPATIENT
Start: 2024-09-05

## 2024-09-15 DIAGNOSIS — R06.02 SHORTNESS OF BREATH: ICD-10-CM

## 2024-09-16 RX ORDER — AMLODIPINE BESYLATE 2.5 MG/1
2.5 TABLET ORAL DAILY
Qty: 90 TABLET | Refills: 3 | Status: SHIPPED | OUTPATIENT
Start: 2024-09-16

## 2024-09-16 RX ORDER — AMLODIPINE BESYLATE 2.5 MG/1
2.5 TABLET ORAL DAILY
Qty: 30 TABLET | Refills: 3 | OUTPATIENT
Start: 2024-09-16

## 2024-09-17 ENCOUNTER — OFFICE VISIT (OUTPATIENT)
Dept: VASCULAR SURGERY | Age: 79
End: 2024-09-17
Payer: MEDICARE

## 2024-09-17 VITALS
SYSTOLIC BLOOD PRESSURE: 150 MMHG | HEIGHT: 69 IN | DIASTOLIC BLOOD PRESSURE: 96 MMHG | HEART RATE: 81 BPM | OXYGEN SATURATION: 94 % | BODY MASS INDEX: 29.98 KG/M2

## 2024-09-17 DIAGNOSIS — I20.9 ANGINA PECTORIS, UNSPECIFIED (HCC): ICD-10-CM

## 2024-09-17 DIAGNOSIS — I73.9 PVD (PERIPHERAL VASCULAR DISEASE) (HCC): Primary | ICD-10-CM

## 2024-09-17 DIAGNOSIS — I25.759 ATHEROSCLEROSIS OF NATIVE CORONARY ARTERY OF TRANSPLANTED HEART WITH ANGINA PECTORIS (HCC): ICD-10-CM

## 2024-09-17 PROBLEM — I25.119 ATHEROSCLEROTIC HEART DISEASE OF NATIVE CORONARY ARTERY WITH UNSPECIFIED ANGINA PECTORIS (HCC): Status: ACTIVE | Noted: 2024-09-17

## 2024-09-17 PROCEDURE — 3080F DIAST BP >= 90 MM HG: CPT | Performed by: SURGERY

## 2024-09-17 PROCEDURE — G8427 DOCREV CUR MEDS BY ELIG CLIN: HCPCS | Performed by: SURGERY

## 2024-09-17 PROCEDURE — 1036F TOBACCO NON-USER: CPT | Performed by: SURGERY

## 2024-09-17 PROCEDURE — 1123F ACP DISCUSS/DSCN MKR DOCD: CPT | Performed by: SURGERY

## 2024-09-17 PROCEDURE — 3077F SYST BP >= 140 MM HG: CPT | Performed by: SURGERY

## 2024-09-17 PROCEDURE — G8417 CALC BMI ABV UP PARAM F/U: HCPCS | Performed by: SURGERY

## 2024-09-17 PROCEDURE — 99204 OFFICE O/P NEW MOD 45 MIN: CPT | Performed by: SURGERY

## 2024-10-02 ENCOUNTER — OFFICE VISIT (OUTPATIENT)
Dept: ORTHOPEDIC SURGERY | Age: 79
End: 2024-10-02
Payer: MEDICARE

## 2024-10-02 VITALS — HEIGHT: 69 IN | BODY MASS INDEX: 30.07 KG/M2 | WEIGHT: 203 LBS

## 2024-10-02 DIAGNOSIS — M54.16 LUMBAR RADICULOPATHY: Primary | ICD-10-CM

## 2024-10-02 PROCEDURE — 1036F TOBACCO NON-USER: CPT | Performed by: PHYSICIAN ASSISTANT

## 2024-10-02 PROCEDURE — G2211 COMPLEX E/M VISIT ADD ON: HCPCS | Performed by: PHYSICIAN ASSISTANT

## 2024-10-02 PROCEDURE — G8484 FLU IMMUNIZE NO ADMIN: HCPCS | Performed by: PHYSICIAN ASSISTANT

## 2024-10-02 PROCEDURE — G8427 DOCREV CUR MEDS BY ELIG CLIN: HCPCS | Performed by: PHYSICIAN ASSISTANT

## 2024-10-02 PROCEDURE — G8417 CALC BMI ABV UP PARAM F/U: HCPCS | Performed by: PHYSICIAN ASSISTANT

## 2024-10-02 PROCEDURE — 99204 OFFICE O/P NEW MOD 45 MIN: CPT | Performed by: PHYSICIAN ASSISTANT

## 2024-10-02 PROCEDURE — 1123F ACP DISCUSS/DSCN MKR DOCD: CPT | Performed by: PHYSICIAN ASSISTANT

## 2024-10-02 NOTE — PROGRESS NOTES
Name: Tadeo Griffiths Sr.  YOB: 1945  Gender: male  MRN: [id number]    CC:   Chief Complaint   Patient presents with    New Patient     Low back pain          HPI:   Tadeo Griffiths Sr. is a 79 y.o. male with a PMHx of neuropathy of unknown cause, CAD, previous MI, varicosities, COPD among other comorbidities below.      They present here for evaluation of pain and cramping in the bilateral thighs and the legs.  He also has some back pain as well.  He previously saw Dr. Pimentel of vascular surgery and was referred to us for evaluation as a possible lumbar radiculopathy.  He is seeing a chiropractor.  He uses a cane to get around.  His primary issue is a lot of cramps in his legs particular with standing and walking and lying down at night.  He has trouble finding a comfortable position.  Denies any numbness or tingling in his legs.  He is on Eliquis and Plavix.          Past Medical History Includes:   Past Medical History:   Diagnosis Date    Aspirin long-term use     CAD (coronary artery disease)     mi--2/2015 cabg 2/2015--- STENT X 2---2/2021-- followed by dr hankins    Cancer ([facility]) 2021    prostate---radiation only    COPD (chronic obstructive pulmonary disease) ([facility])     Elevated hemidiaphragm 2/21/2015 2/20 barium swallow on 2/12/15 that revealed an unremarkable esophagus and paralyzed left diaphragm with an unusual rotation of the stomach in the left upper quadrant  esophagram 2/12/2015 which revealed an abnormal contour the left hemidiaphragm, suggesting diaphragmatic hernia.  2/20 Barium Swallow Barium swallow today to assess for any signs of possible torsion. If no torsion present, will likel    Elevated hemoglobin A1c 2/20/2015    6.1 - 2/17/15     Full dentures 2/21/2015    GERD (gastroesophageal reflux disease)     controlled with med    Hernia of abdominal cavity 6/26/2015    Hernia, incisional     History of atrial flutter 8/2015    ablation--- followed by dr hankins    History of complete eye

## 2024-10-10 ENCOUNTER — LAB (OUTPATIENT)
Dept: UROLOGY | Age: 79
End: 2024-10-10

## 2024-10-10 DIAGNOSIS — C61 MALIGNANT NEOPLASM OF PROSTATE (HCC): ICD-10-CM

## 2024-10-10 LAB — PSA SERPL-MCNC: 0.3 NG/ML (ref 0–4)

## 2024-10-16 ENCOUNTER — OFFICE VISIT (OUTPATIENT)
Dept: ORTHOPEDIC SURGERY | Age: 79
End: 2024-10-16
Payer: MEDICARE

## 2024-10-16 VITALS — HEIGHT: 69 IN | WEIGHT: 203 LBS | BODY MASS INDEX: 30.07 KG/M2

## 2024-10-16 DIAGNOSIS — M51.361 DEGENERATION OF INTERVERTEBRAL DISC OF LUMBAR REGION WITH LOWER EXTREMITY PAIN: Primary | ICD-10-CM

## 2024-10-16 PROCEDURE — G2211 COMPLEX E/M VISIT ADD ON: HCPCS | Performed by: PHYSICIAN ASSISTANT

## 2024-10-16 PROCEDURE — G8427 DOCREV CUR MEDS BY ELIG CLIN: HCPCS | Performed by: PHYSICIAN ASSISTANT

## 2024-10-16 PROCEDURE — 99214 OFFICE O/P EST MOD 30 MIN: CPT | Performed by: PHYSICIAN ASSISTANT

## 2024-10-16 PROCEDURE — G8484 FLU IMMUNIZE NO ADMIN: HCPCS | Performed by: PHYSICIAN ASSISTANT

## 2024-10-16 PROCEDURE — G8417 CALC BMI ABV UP PARAM F/U: HCPCS | Performed by: PHYSICIAN ASSISTANT

## 2024-10-16 PROCEDURE — 1036F TOBACCO NON-USER: CPT | Performed by: PHYSICIAN ASSISTANT

## 2024-10-16 PROCEDURE — 1123F ACP DISCUSS/DSCN MKR DOCD: CPT | Performed by: PHYSICIAN ASSISTANT

## 2024-10-16 ASSESSMENT — ENCOUNTER SYMPTOMS: BACK PAIN: 0

## 2024-10-16 NOTE — PROGRESS NOTES
Low risk for procedure and to hold plavix 5 days, ASA 5-7 days, resume both 24 hours post  
spinal  stenosis. Moderate right and mild/moderate left foraminal narrowing.    L5-S1: No focal disc abnormality, spinal stenosis or foraminal narrowing.    Impression  1. Transitional lumbosacral anatomy which requires careful correlation with  vertebral levels if surgery is considered.  2. Acquired mild spinal stenosis at L2-L3.  3. Varying degrees of mild to moderate/severe bilateral foraminal narrowing as  described above.  4. Mild dextroscoliosis.        Electronically signed by Mulugeta Wayne            Assessment and Plan    I reviewed the MRI findings with the patient.  Appears to be a partial lumbarized S1.  We discussed options for management.  The patient is in favor of lumbar injection therapy.  I recommend bilateral L4 selective nerve root blocks with Dr. Jordan.  May also consider L5 nerve root blocks in the future.  Plan to follow-up in 6 months for recheck or sooner as needed.  Patient is on Plavix and Eliquis.  Risk benefits were discussed with the procedure in detail and all questions were answered to satisfaction.      Clinical Notes:   I/my clinic will serve as the continuing focal point for all needed health care services and/or medical care services that are part of ongoing PAIN care related to patient's single, serious, or complex PAIN condition with the following diagnoses: Lumbar DDD with lower extremity pain   Chronic Pain Condition that is not stable = not at the patient's treatment goal

## 2024-10-16 NOTE — PROGRESS NOTES
[facility]  [address] [place] [id number]  [phone]          Tadeo Griffiths Sr.  : 1945    Chief Complaint   Patient presents with    Follow-up     yearly          HPI     Tadeo Griffiths Sr. is a 79 y.o. male followed in regards to prostate cancer.     He was referred by Dr. Priest in regards to need for TRUS guided prostate biopsy in an operative setting.  Patient has a + family history of ACP with his brother being diagnosed in his mid 60's.  Patient has mild to moderate LUTS.  He has CAD s/p CABG and is on 81 mg aspirin.  He has undergone 1 previous prostate biopsy by Dr. Andrade and had a BAD experience with a lot of pain during the procedure.       TRUS guided prostate biopsy in the OR 19 revealed [place] 6,7, and 8 prostate cancer.  Gland was 53 grams.  He has undergone previous inguinal and umbilical hernia repairs with mesh.     He finished EBRT with Dr. Jose in .  He finished 4 45 mg lupron injections.  First 45 mg lupron injection was given 20.  Last was given 21.       He is on tamsulosin.  He knows he can stop and not have to restart if he finds it wasn't helping.        PSA: 10/15 6.0,  5.5, 10/16 7.8,  9.3,  11.38,  16.2,  21.3,  21.5, 21 <0.1,  <0.006, 3/22 <0.1, 10/22 0.1, 10/23 0.2, 10/24 0.3          Past Medical History:   Diagnosis Date    Aspirin long-term use     CAD (coronary artery disease)     mi--2015 cabg 2015--- STENT X 2---2021-- followed by dr hankins    Cancer ([facility])     prostate---radiation only    COPD (chronic obstructive pulmonary disease) ([facility])     Elevated hemidiaphragm 2015 barium swallow on 2/12/15 that revealed an unremarkable esophagus and paralyzed left diaphragm with an unusual rotation of the stomach in the left upper quadrant  esophagram 2015 which revealed an abnormal contour the left hemidiaphragm, suggesting diaphragmatic hernia.   Barium

## 2024-10-17 ENCOUNTER — OFFICE VISIT (OUTPATIENT)
Dept: UROLOGY | Age: 79
End: 2024-10-17
Payer: MEDICARE

## 2024-10-17 DIAGNOSIS — C61 MALIGNANT NEOPLASM OF PROSTATE (HCC): Primary | ICD-10-CM

## 2024-10-17 DIAGNOSIS — N40.1 BENIGN PROSTATIC HYPERPLASIA WITH URINARY OBSTRUCTION: ICD-10-CM

## 2024-10-17 DIAGNOSIS — N13.8 BENIGN PROSTATIC HYPERPLASIA WITH URINARY OBSTRUCTION: ICD-10-CM

## 2024-10-17 LAB
BILIRUBIN, URINE, POC: NEGATIVE
BLOOD URINE, POC: NORMAL
GLUCOSE URINE, POC: NEGATIVE MG/DL
KETONES, URINE, POC: NEGATIVE MG/DL
LEUKOCYTE ESTERASE, URINE, POC: NEGATIVE
NITRITE, URINE, POC: NEGATIVE
PH, URINE, POC: 7 (ref 4.6–8)
PROTEIN,URINE, POC: NEGATIVE MG/DL
SPECIFIC GRAVITY, URINE, POC: 1.02 (ref 1–1.03)
URINALYSIS CLARITY, POC: NORMAL
URINALYSIS COLOR, POC: NORMAL
UROBILINOGEN, POC: NORMAL MG/DL

## 2024-10-17 PROCEDURE — 99213 OFFICE O/P EST LOW 20 MIN: CPT | Performed by: UROLOGY

## 2024-10-17 PROCEDURE — 1123F ACP DISCUSS/DSCN MKR DOCD: CPT | Performed by: UROLOGY

## 2024-10-17 PROCEDURE — G8484 FLU IMMUNIZE NO ADMIN: HCPCS | Performed by: UROLOGY

## 2024-10-17 PROCEDURE — 1036F TOBACCO NON-USER: CPT | Performed by: UROLOGY

## 2024-10-17 PROCEDURE — G8417 CALC BMI ABV UP PARAM F/U: HCPCS | Performed by: UROLOGY

## 2024-10-17 PROCEDURE — G8427 DOCREV CUR MEDS BY ELIG CLIN: HCPCS | Performed by: UROLOGY

## 2024-10-17 PROCEDURE — 81003 URINALYSIS AUTO W/O SCOPE: CPT | Performed by: UROLOGY

## 2024-10-17 RX ORDER — TAMSULOSIN HYDROCHLORIDE 0.4 MG/1
0.4 CAPSULE ORAL DAILY
Qty: 90 CAPSULE | Refills: 3 | Status: SHIPPED | OUTPATIENT
Start: 2024-10-17

## 2024-10-28 ENCOUNTER — TELEPHONE (OUTPATIENT)
Dept: ORTHOPEDIC SURGERY | Age: 79
End: 2024-10-28

## 2024-10-28 NOTE — TELEPHONE ENCOUNTER
Call returned to patient to inform patient that Dr. Jordan is out of the office due to a medical emergency. Patient stated that he needs to get the injection as soon as possible since he has already stopped his blood thinners. Patient has been rescheduled to see Dr. Ramirez on 10/30/24 and patient is aware that he will be going to [facility] for this appointment.

## 2024-10-30 ENCOUNTER — OFFICE VISIT (OUTPATIENT)
Dept: ORTHOPEDIC SURGERY | Age: 79
End: 2024-10-30
Payer: MEDICARE

## 2024-10-30 DIAGNOSIS — M54.16 LUMBAR RADICULOPATHY: Primary | ICD-10-CM

## 2024-10-30 PROCEDURE — 64483 NJX AA&/STRD TFRM EPI L/S 1: CPT | Performed by: PHYSICAL MEDICINE & REHABILITATION

## 2024-10-30 RX ORDER — TRIAMCINOLONE ACETONIDE 40 MG/ML
160 INJECTION, SUSPENSION INTRA-ARTICULAR; INTRAMUSCULAR ONCE
Status: COMPLETED | OUTPATIENT
Start: 2024-10-30 | End: 2024-10-30

## 2024-10-30 RX ADMIN — TRIAMCINOLONE ACETONIDE 160 MG: 40 INJECTION, SUSPENSION INTRA-ARTICULAR; INTRAMUSCULAR at 09:18

## 2024-10-30 NOTE — PROGRESS NOTES
Date: 10/30/24   Name: Tadeo Griffiths Sr.    Pre-Procedural Diagnosis:    Diagnosis Orders   1. Lumbar radiculopathy  FL NERVE BLOCK LUMBOSACRAL 1ST    triamcinolone acetonide (KENALOG-40) injection 160 mg          Procedure: Bilateral Selective Nerve Root Blocks (Transforaminal) - Single Level    I have reviewed clinician's notes and orders placed. and I have personally reviewed with patient the informed consent for operation/procedure per [facility] protocol.  This involves risks and benefits of procedure, potential for success/improvement of injections,qualifications of physician performing procedure.  Consent form addressed appropriate local anesthesia, emergent blood transfusion, clarification of DNR status.  This form was signed by all appropriate parties and scanned into the medical record. Note that is not appropriate for me to discuss spine surgical issues or other treatment options if I am not the primary clinician.  If I am the ordering clinician, those issues would have been discussed at the appropriate office visit or at upcoming encounter.     Placed needle with review of fluoroscopic images counting down from the lowest rib-bearing vertebrae.    Precautions: [facility] Precautions spine injections: None.  Patient denies any prior sensitivity to steroid, local anesthetic, contrast dye, iodine or shellfish.    The procedure was discussed at length with the patient and informed consent was signed. The patient was placed in a prone position on the fluoroscopy table and the skin was prepped and draped in a routine sterile fashion. The areas to be injected was/were anesthetized with approximately 5 cc of 1% Lidocaine. A 22-gauge 3.5 inch spinal needle was carefully advanced under fluoroscopic guidance to the bilateral L4 transforaminal spaces.  At this time 0.25 cc of omnipaque administered.  Once proper placement was confirmed, 2 cc of 0.25% Marcaine and 80 mg of Kenalog were injected through the spinal needle at

## 2024-11-21 ENCOUNTER — OFFICE VISIT (OUTPATIENT)
Dept: FAMILY MEDICINE CLINIC | Facility: CLINIC | Age: 79
End: 2024-11-21

## 2024-11-21 VITALS
SYSTOLIC BLOOD PRESSURE: 114 MMHG | HEIGHT: 69 IN | DIASTOLIC BLOOD PRESSURE: 76 MMHG | WEIGHT: 210 LBS | BODY MASS INDEX: 31.1 KG/M2 | HEART RATE: 99 BPM

## 2024-11-21 DIAGNOSIS — R06.02 SHORTNESS OF BREATH: ICD-10-CM

## 2024-11-21 DIAGNOSIS — E78.00 PURE HYPERCHOLESTEROLEMIA: ICD-10-CM

## 2024-11-21 DIAGNOSIS — Z00.00 MEDICARE ANNUAL WELLNESS VISIT, SUBSEQUENT: ICD-10-CM

## 2024-11-21 DIAGNOSIS — I48.3 TYPICAL ATRIAL FLUTTER (HCC): ICD-10-CM

## 2024-11-21 DIAGNOSIS — J43.1 PANLOBULAR EMPHYSEMA (HCC): Primary | ICD-10-CM

## 2024-11-21 DIAGNOSIS — I25.10 CAD IN NATIVE ARTERY: ICD-10-CM

## 2024-11-21 DIAGNOSIS — I10 ESSENTIAL HYPERTENSION WITH GOAL BLOOD PRESSURE LESS THAN 140/90: ICD-10-CM

## 2024-11-21 DIAGNOSIS — D50.9 IRON DEFICIENCY ANEMIA, UNSPECIFIED IRON DEFICIENCY ANEMIA TYPE: ICD-10-CM

## 2024-11-21 DIAGNOSIS — K21.9 GASTRO-ESOPHAGEAL REFLUX DISEASE WITHOUT ESOPHAGITIS: ICD-10-CM

## 2024-11-21 PROBLEM — Z94.1 HEART TRANSPLANT STATUS (HCC): Status: ACTIVE | Noted: 2024-11-21

## 2024-11-21 RX ORDER — LISINOPRIL 20 MG/1
20 TABLET ORAL DAILY
Qty: 90 TABLET | Refills: 3 | Status: SHIPPED | OUTPATIENT
Start: 2024-11-21

## 2024-11-21 RX ORDER — ALBUTEROL SULFATE 0.83 MG/ML
2.5 SOLUTION RESPIRATORY (INHALATION) 4 TIMES DAILY
Qty: 120 EACH | Refills: 5 | Status: SHIPPED | OUTPATIENT
Start: 2024-11-21

## 2024-11-21 RX ORDER — METOPROLOL TARTRATE 25 MG/1
12.5 TABLET, FILM COATED ORAL EVERY 12 HOURS
Qty: 90 TABLET | Refills: 3 | Status: SHIPPED | OUTPATIENT
Start: 2024-11-21

## 2024-11-21 RX ORDER — PRAVASTATIN SODIUM 80 MG/1
80 TABLET ORAL DAILY
Qty: 90 TABLET | Refills: 3 | Status: SHIPPED | OUTPATIENT
Start: 2024-11-21

## 2024-11-21 RX ORDER — DOXAZOSIN 8 MG/1
8 TABLET ORAL 2 TIMES DAILY
Qty: 180 TABLET | Refills: 3 | Status: SHIPPED | OUTPATIENT
Start: 2024-11-21 | End: 2025-11-16

## 2024-11-21 RX ORDER — FERROUS SULFATE 325(65) MG
325 TABLET ORAL
Qty: 90 TABLET | Refills: 3 | Status: SHIPPED | OUTPATIENT
Start: 2024-11-21

## 2024-11-21 RX ORDER — CLOPIDOGREL BISULFATE 75 MG/1
75 TABLET ORAL DAILY
Qty: 90 TABLET | Refills: 3 | Status: SHIPPED | OUTPATIENT
Start: 2024-11-21

## 2024-11-21 SDOH — ECONOMIC STABILITY: FOOD INSECURITY: WITHIN THE PAST 12 MONTHS, YOU WORRIED THAT YOUR FOOD WOULD RUN OUT BEFORE YOU GOT MONEY TO BUY MORE.: NEVER TRUE

## 2024-11-21 SDOH — ECONOMIC STABILITY: FOOD INSECURITY: WITHIN THE PAST 12 MONTHS, THE FOOD YOU BOUGHT JUST DIDN'T LAST AND YOU DIDN'T HAVE MONEY TO GET MORE.: NEVER TRUE

## 2024-11-21 SDOH — ECONOMIC STABILITY: INCOME INSECURITY: HOW HARD IS IT FOR YOU TO PAY FOR THE VERY BASICS LIKE FOOD, HOUSING, MEDICAL CARE, AND HEATING?: NOT HARD AT ALL

## 2024-11-21 ASSESSMENT — ENCOUNTER SYMPTOMS
ABDOMINAL PAIN: 0
VOMITING: 0
COUGH: 0
DIARRHEA: 0
CONSTIPATION: 0
SORE THROAT: 0
WHEEZING: 0
NAUSEA: 0
SHORTNESS OF BREATH: 0

## 2024-11-21 ASSESSMENT — PATIENT HEALTH QUESTIONNAIRE - PHQ9
SUM OF ALL RESPONSES TO PHQ QUESTIONS 1-9: 0
1. LITTLE INTEREST OR PLEASURE IN DOING THINGS: NOT AT ALL
SUM OF ALL RESPONSES TO PHQ QUESTIONS 1-9: 0
2. FEELING DOWN, DEPRESSED OR HOPELESS: NOT AT ALL
SUM OF ALL RESPONSES TO PHQ9 QUESTIONS 1 & 2: 0

## 2024-11-21 NOTE — PROGRESS NOTES
PROGRESS NOTE    SUBJECTIVE:   Tadeo Griffiths Sr. is a 79 y.o. male seen for a follow up visit regarding the following chief complaint:     Chief Complaint   Patient presents with    Medicare AWV     Medication refills and pharmacy confirmed.           HPI patient presents office today for complete physical/Medicare wellness visit without complaints      Past Medical History, Past Surgical History, Family history, Social History, and Medications were all reviewed with the patient today and updated as necessary.       Current Outpatient Medications   Medication Sig Dispense Refill    clopidogrel (PLAVIX) 75 MG tablet Take 1 tablet by mouth daily 90 tablet 3    doxazosin (CARDURA) 8 MG tablet Take 1 tablet by mouth 2 times daily 180 tablet 3    ferrous sulfate (IRON 325) 325 (65 Fe) MG tablet Take 1 tablet by mouth every morning (before breakfast) 90 tablet 3    lisinopril (PRINIVIL;ZESTRIL) 20 MG tablet Take 1 tablet by mouth daily Take 1 tablet by mouth twice a day for hypertension 90 tablet 3    metoprolol tartrate (LOPRESSOR) 25 MG tablet Take 0.5 tablets by mouth in the morning and 0.5 tablets in the evening. 90 tablet 3    omeprazole (PRILOSEC) 20 MG delayed release capsule Take 2 capsules by mouth in the morning and at bedtime 180 capsule 3    pravastatin (PRAVACHOL) 80 MG tablet Take 1 tablet by mouth daily 90 tablet 3    albuterol (PROVENTIL) (2.5 MG/3ML) 0.083% nebulizer solution Take 3 mLs by nebulization 4 times daily 120 each 5    tamsulosin (FLOMAX) 0.4 MG capsule Take 1 capsule by mouth daily 90 capsule 3    amLODIPine (NORVASC) 2.5 MG tablet TAKE 1 TABLET BY MOUTH EVERY DAY 90 tablet 3    ADVAIR -21 MCG/ACT inhaler Inhale 2 puffs into the lungs 2 times daily 1 each 3    furosemide (LASIX) 20 MG tablet Take 1 tablet by mouth daily 90 tablet 3    potassium chloride (KLOR-CON M) 10 MEQ extended release tablet Take 1 tablet by mouth daily 90 tablet 1    nitroGLYCERIN (NITROSTAT) 0.4 MG SL tablet Place

## 2024-11-22 ENCOUNTER — LAB (OUTPATIENT)
Dept: FAMILY MEDICINE CLINIC | Facility: CLINIC | Age: 79
End: 2024-11-22
Payer: MEDICARE

## 2024-11-22 DIAGNOSIS — D50.9 IRON DEFICIENCY ANEMIA, UNSPECIFIED IRON DEFICIENCY ANEMIA TYPE: ICD-10-CM

## 2024-11-22 DIAGNOSIS — I10 ESSENTIAL HYPERTENSION WITH GOAL BLOOD PRESSURE LESS THAN 140/90: ICD-10-CM

## 2024-11-22 DIAGNOSIS — E78.00 PURE HYPERCHOLESTEROLEMIA: Primary | ICD-10-CM

## 2024-11-22 LAB
ALBUMIN SERPL-MCNC: 3.7 G/DL (ref 3.2–4.6)
ALBUMIN/GLOB SERPL: 1.3 (ref 1–1.9)
ALP SERPL-CCNC: 60 U/L (ref 40–129)
ALT SERPL-CCNC: 22 U/L (ref 8–55)
ANION GAP SERPL CALC-SCNC: 6 MMOL/L (ref 7–16)
AST SERPL-CCNC: 23 U/L (ref 15–37)
BASOPHILS # BLD: 0 K/UL (ref 0–0.2)
BASOPHILS NFR BLD: 0 % (ref 0–2)
BILIRUB SERPL-MCNC: 0.4 MG/DL (ref 0–1.2)
BILIRUBIN, URINE, POC: NEGATIVE
BLOOD URINE, POC: ABNORMAL
BUN SERPL-MCNC: 16 MG/DL (ref 8–23)
CALCIUM SERPL-MCNC: 8.8 MG/DL (ref 8.8–10.2)
CHLORIDE SERPL-SCNC: 98 MMOL/L (ref 98–107)
CHOLEST SERPL-MCNC: 172 MG/DL (ref 0–200)
CO2 SERPL-SCNC: 30 MMOL/L (ref 20–29)
CREAT SERPL-MCNC: 0.78 MG/DL (ref 0.8–1.3)
DIFFERENTIAL METHOD BLD: ABNORMAL
EOSINOPHIL # BLD: 0 K/UL (ref 0–0.8)
EOSINOPHIL NFR BLD: 1 % (ref 0.5–7.8)
ERYTHROCYTE [DISTWIDTH] IN BLOOD BY AUTOMATED COUNT: 13.6 % (ref 11.9–14.6)
GLOBULIN SER CALC-MCNC: 2.8 G/DL (ref 2.3–3.5)
GLUCOSE SERPL-MCNC: 92 MG/DL (ref 70–99)
GLUCOSE URINE, POC: NEGATIVE
HCT VFR BLD AUTO: 39.5 % (ref 41.1–50.3)
HDLC SERPL-MCNC: 80 MG/DL (ref 40–60)
HDLC SERPL: 2.2 (ref 0–5)
HGB BLD-MCNC: 12.5 G/DL (ref 13.6–17.2)
IMM GRANULOCYTES # BLD AUTO: 0 K/UL (ref 0–0.5)
IMM GRANULOCYTES NFR BLD AUTO: 0 % (ref 0–5)
KETONES, URINE, POC: NEGATIVE
LDLC SERPL CALC-MCNC: 85 MG/DL (ref 0–100)
LEUKOCYTE ESTERASE, URINE, POC: NEGATIVE
LYMPHOCYTES # BLD: 0.6 K/UL (ref 0.5–4.6)
LYMPHOCYTES NFR BLD: 12 % (ref 13–44)
MCH RBC QN AUTO: 29.8 PG (ref 26.1–32.9)
MCHC RBC AUTO-ENTMCNC: 31.6 G/DL (ref 31.4–35)
MCV RBC AUTO: 94.3 FL (ref 82–102)
MONOCYTES # BLD: 0.7 K/UL (ref 0.1–1.3)
MONOCYTES NFR BLD: 16 % (ref 4–12)
NEUTS SEG # BLD: 3.2 K/UL (ref 1.7–8.2)
NEUTS SEG NFR BLD: 71 % (ref 43–78)
NITRITE, URINE, POC: NEGATIVE
NRBC # BLD: 0 K/UL (ref 0–0.2)
PH, URINE, POC: 7 (ref 4.6–8)
PLATELET # BLD AUTO: 129 K/UL (ref 150–450)
PMV BLD AUTO: 8.6 FL (ref 9.4–12.3)
POTASSIUM SERPL-SCNC: 4.7 MMOL/L (ref 3.5–5.1)
PROT SERPL-MCNC: 6.4 G/DL (ref 6.3–8.2)
PROTEIN,URINE, POC: NEGATIVE
RBC # BLD AUTO: 4.19 M/UL (ref 4.23–5.6)
SODIUM SERPL-SCNC: 134 MMOL/L (ref 136–145)
SPECIFIC GRAVITY, URINE, POC: 1.01 (ref 1–1.03)
TRIGL SERPL-MCNC: 34 MG/DL (ref 0–150)
URINALYSIS CLARITY, POC: ABNORMAL
URINALYSIS COLOR, POC: YELLOW
UROBILINOGEN, POC: NORMAL
VLDLC SERPL CALC-MCNC: 7 MG/DL (ref 6–23)
WBC # BLD AUTO: 4.5 K/UL (ref 4.3–11.1)

## 2024-11-22 PROCEDURE — 81003 URINALYSIS AUTO W/O SCOPE: CPT | Performed by: FAMILY MEDICINE

## 2024-11-26 NOTE — TELEPHONE ENCOUNTER
Patient Refill Request     Last Office Visit: 8/29/24     When they were supposed to follow up: 4 months     Upcoming Office Visit: 12/19/24     Refills Requested: Breztri     Type of refill: 90 day     Requested Pharmacy: [facility]     Is prescription pended? Yes

## 2024-11-27 RX ORDER — BUDESONIDE, GLYCOPYRROLATE, AND FORMOTEROL FUMARATE 160; 9; 4.8 UG/1; UG/1; UG/1
2 AEROSOL, METERED RESPIRATORY (INHALATION) 2 TIMES DAILY
Qty: 3 EACH | Refills: 3 | Status: SHIPPED | OUTPATIENT
Start: 2024-11-27

## 2024-12-05 ENCOUNTER — OFFICE VISIT (OUTPATIENT)
Dept: FAMILY MEDICINE CLINIC | Facility: CLINIC | Age: 79
End: 2024-12-05
Payer: MEDICARE

## 2024-12-05 VITALS
SYSTOLIC BLOOD PRESSURE: 128 MMHG | HEART RATE: 70 BPM | HEIGHT: 69 IN | BODY MASS INDEX: 31.55 KG/M2 | DIASTOLIC BLOOD PRESSURE: 82 MMHG | WEIGHT: 213 LBS

## 2024-12-05 DIAGNOSIS — I10 ESSENTIAL HYPERTENSION WITH GOAL BLOOD PRESSURE LESS THAN 140/90: ICD-10-CM

## 2024-12-05 DIAGNOSIS — E87.6 HYPOKALEMIA: Primary | ICD-10-CM

## 2024-12-05 PROCEDURE — G8417 CALC BMI ABV UP PARAM F/U: HCPCS | Performed by: FAMILY MEDICINE

## 2024-12-05 PROCEDURE — 99213 OFFICE O/P EST LOW 20 MIN: CPT | Performed by: FAMILY MEDICINE

## 2024-12-05 PROCEDURE — 1036F TOBACCO NON-USER: CPT | Performed by: FAMILY MEDICINE

## 2024-12-05 PROCEDURE — 3079F DIAST BP 80-89 MM HG: CPT | Performed by: FAMILY MEDICINE

## 2024-12-05 PROCEDURE — 1123F ACP DISCUSS/DSCN MKR DOCD: CPT | Performed by: FAMILY MEDICINE

## 2024-12-05 PROCEDURE — G8427 DOCREV CUR MEDS BY ELIG CLIN: HCPCS | Performed by: FAMILY MEDICINE

## 2024-12-05 PROCEDURE — G8484 FLU IMMUNIZE NO ADMIN: HCPCS | Performed by: FAMILY MEDICINE

## 2024-12-05 PROCEDURE — 1159F MED LIST DOCD IN RCRD: CPT | Performed by: FAMILY MEDICINE

## 2024-12-05 PROCEDURE — 3074F SYST BP LT 130 MM HG: CPT | Performed by: FAMILY MEDICINE

## 2024-12-05 RX ORDER — POTASSIUM CHLORIDE 750 MG/1
10 TABLET, EXTENDED RELEASE ORAL DAILY
Qty: 90 TABLET | Refills: 3 | Status: SHIPPED | OUTPATIENT
Start: 2024-12-05

## 2024-12-05 ASSESSMENT — ENCOUNTER SYMPTOMS
SHORTNESS OF BREATH: 0
NAUSEA: 0
VOMITING: 0

## 2024-12-05 NOTE — PROGRESS NOTES
PROGRESS NOTE    SUBJECTIVE:   Tadeo Griffiths Sr. is a 79 y.o. male seen for a follow up visit regarding the following chief complaint:     Chief Complaint   Patient presents with    Follow-up     Labs  Requesting potassium be prescribed by dr. alonzo           HPI  Patient presents office for follow-up of his lab work from his physical/Medicare wellness visit needs a refill written in his hand for his potassium    Past Medical History, Past Surgical History, Family history, Social History, and Medications were all reviewed with the patient today and updated as necessary.       Current Outpatient Medications   Medication Sig Dispense Refill    potassium chloride (KLOR-CON M) 10 MEQ extended release tablet Take 1 tablet by mouth daily 90 tablet 3    BREZTRI AEROSPHERE 160-9-4.8 MCG/ACT AERO Inhale 2 each into the lungs in the morning and at bedtime 3 each 3    clopidogrel (PLAVIX) 75 MG tablet Take 1 tablet by mouth daily 90 tablet 3    doxazosin (CARDURA) 8 MG tablet Take 1 tablet by mouth 2 times daily 180 tablet 3    ferrous sulfate (IRON 325) 325 (65 Fe) MG tablet Take 1 tablet by mouth every morning (before breakfast) 90 tablet 3    lisinopril (PRINIVIL;ZESTRIL) 20 MG tablet Take 1 tablet by mouth daily Take 1 tablet by mouth twice a day for hypertension 90 tablet 3    metoprolol tartrate (LOPRESSOR) 25 MG tablet Take 0.5 tablets by mouth in the morning and 0.5 tablets in the evening. 90 tablet 3    omeprazole (PRILOSEC) 20 MG delayed release capsule Take 2 capsules by mouth in the morning and at bedtime 180 capsule 3    pravastatin (PRAVACHOL) 80 MG tablet Take 1 tablet by mouth daily 90 tablet 3    albuterol (PROVENTIL) (2.5 MG/3ML) 0.083% nebulizer solution Take 3 mLs by nebulization 4 times daily 120 each 5    tamsulosin (FLOMAX) 0.4 MG capsule Take 1 capsule by mouth daily 90 capsule 3    amLODIPine (NORVASC) 2.5 MG tablet TAKE 1 TABLET BY MOUTH EVERY DAY 90 tablet 3    ADVAIR -21 MCG/ACT inhaler Inhale

## 2024-12-19 ENCOUNTER — OFFICE VISIT (OUTPATIENT)
Dept: PULMONOLOGY | Age: 79
End: 2024-12-19
Payer: MEDICARE

## 2024-12-19 VITALS
WEIGHT: 216 LBS | OXYGEN SATURATION: 93 % | HEIGHT: 69 IN | RESPIRATION RATE: 19 BRPM | TEMPERATURE: 97.4 F | BODY MASS INDEX: 31.99 KG/M2 | DIASTOLIC BLOOD PRESSURE: 63 MMHG | HEART RATE: 101 BPM | SYSTOLIC BLOOD PRESSURE: 121 MMHG

## 2024-12-19 DIAGNOSIS — J98.6 ELEVATED HEMIDIAPHRAGM: ICD-10-CM

## 2024-12-19 DIAGNOSIS — J44.9 CHRONIC OBSTRUCTIVE PULMONARY DISEASE, UNSPECIFIED COPD TYPE (HCC): ICD-10-CM

## 2024-12-19 DIAGNOSIS — G47.33 OSA (OBSTRUCTIVE SLEEP APNEA): Primary | ICD-10-CM

## 2024-12-19 PROCEDURE — 3078F DIAST BP <80 MM HG: CPT | Performed by: INTERNAL MEDICINE

## 2024-12-19 PROCEDURE — G8427 DOCREV CUR MEDS BY ELIG CLIN: HCPCS | Performed by: INTERNAL MEDICINE

## 2024-12-19 PROCEDURE — 1123F ACP DISCUSS/DSCN MKR DOCD: CPT | Performed by: INTERNAL MEDICINE

## 2024-12-19 PROCEDURE — G8417 CALC BMI ABV UP PARAM F/U: HCPCS | Performed by: INTERNAL MEDICINE

## 2024-12-19 PROCEDURE — 3023F SPIROM DOC REV: CPT | Performed by: INTERNAL MEDICINE

## 2024-12-19 PROCEDURE — 99214 OFFICE O/P EST MOD 30 MIN: CPT | Performed by: INTERNAL MEDICINE

## 2024-12-19 PROCEDURE — 1036F TOBACCO NON-USER: CPT | Performed by: INTERNAL MEDICINE

## 2024-12-19 PROCEDURE — 1159F MED LIST DOCD IN RCRD: CPT | Performed by: INTERNAL MEDICINE

## 2024-12-19 PROCEDURE — G8484 FLU IMMUNIZE NO ADMIN: HCPCS | Performed by: INTERNAL MEDICINE

## 2024-12-19 PROCEDURE — 3074F SYST BP LT 130 MM HG: CPT | Performed by: INTERNAL MEDICINE

## 2024-12-19 RX ORDER — FLUTICASONE FUROATE, UMECLIDINIUM BROMIDE AND VILANTEROL TRIFENATATE 100; 62.5; 25 UG/1; UG/1; UG/1
1 POWDER RESPIRATORY (INHALATION) DAILY
Qty: 3 EACH | Refills: 3 | Status: SHIPPED | OUTPATIENT
Start: 2024-12-19

## 2024-12-19 RX ORDER — ALBUTEROL SULFATE 0.83 MG/ML
2.5 SOLUTION RESPIRATORY (INHALATION) EVERY 6 HOURS PRN
Qty: 360 EACH | Refills: 3 | Status: SHIPPED | OUTPATIENT
Start: 2024-12-19

## 2024-12-19 NOTE — PROGRESS NOTES
controlled with med    Hernia of abdominal cavity 2015    Hernia, incisional     History of atrial flutter 2015    ablation--- followed by dr hankins    History of complete eye exam 2015    [facility]    Hypercholesterolemia     Hyperlipidemia 2015    Hypertension     controlled with med    MI, old 2015    NSTEMI (non-ST elevated myocardial infarction) (HCC) 2015    OA (osteoarthritis) 2015    Obstructive sleep apnea     Poor historian     Postoperative anemia due to acute blood loss 2015    S/P CABG x 2 2015    Thrombocytopenia due to extra corporeal by-pass circulation 2015    Patient denies    Varicose veins of lower extremities with other complications 2014    7/24/15 (Dr Wall) L [facility] RFA ablation 5/15/14 (Dr. Wall) R [facility] Radiofrequency ablation     Wears dentures         Tobacco Use      Smoking status: Former        Packs/day: 0.00        Years: 0.3 packs/day for 40.0 years (10.0 ttl pk-yrs)        Types: Cigarettes        Start date: 1961        Quit date: 2001        Years since quittin.6        Passive exposure: Past      Smokeless tobacco: Never      No Known Allergies  Current Outpatient Medications   Medication Instructions    acetaminophen (TYLENOL) 500 mg, Oral, EVERY 4 HOURS PRN, May take up to 2 tablets.    ADVAIR -21 MCG/ACT inhaler 2 puffs, Inhalation, 2 TIMES DAILY    albuterol (PROVENTIL) 2.5 mg, Nebulization, 4 TIMES DAILY    albuterol (PROVENTIL) 2.5 mg, Nebulization, EVERY 6 HOURS PRN    albuterol sulfate HFA (PROVENTIL;VENTOLIN;PROAIR) 108 (90 Base) MCG/ACT inhaler 2 puffs, Inhalation, EVERY 6 HOURS PRN    amLODIPine (NORVASC) 2.5 mg, Oral, DAILY    apixaban (ELIQUIS) 5 mg, Oral, 2 TIMES DAILY    ascorbic acid (VITAMIN C) 500 MG tablet Oral, DAILY    aspirin 81 mg, Oral, DAILY    BREZTRI AEROSPHERE 160-9-4.8 MCG/ACT AERO 2 each, Inhalation, 2 times daily    Calcium Carbonate-Vit D-Min (CALCIUM 600+D3 PLUS MINERALS)

## 2024-12-24 RX ORDER — LISINOPRIL 20 MG/1
20 TABLET ORAL 2 TIMES DAILY
COMMUNITY
End: 2024-12-24 | Stop reason: SDUPTHER

## 2024-12-24 RX ORDER — LISINOPRIL 20 MG/1
20 TABLET ORAL 2 TIMES DAILY
Qty: 90 TABLET | Refills: 3 | Status: SHIPPED | OUTPATIENT
Start: 2024-12-24

## 2025-01-17 ENCOUNTER — TELEPHONE (OUTPATIENT)
Age: 80
End: 2025-01-17

## 2025-01-17 DIAGNOSIS — R06.02 SOB (SHORTNESS OF BREATH): ICD-10-CM

## 2025-01-17 DIAGNOSIS — R60.0 BILATERAL LEG EDEMA: ICD-10-CM

## 2025-01-17 DIAGNOSIS — R79.89 ELEVATED D-DIMER: Primary | ICD-10-CM

## 2025-01-17 DIAGNOSIS — R06.02 SHORTNESS OF BREATH: Primary | ICD-10-CM

## 2025-01-17 DIAGNOSIS — R06.02 SHORTNESS OF BREATH: ICD-10-CM

## 2025-01-17 LAB
D DIMER PPP FEU-MCNC: 1.45 UG/ML(FEU)
NT PRO BNP: 1349 PG/ML (ref 0–450)

## 2025-01-17 NOTE — TELEPHONE ENCOUNTER
Patient is having chest discomfort in his chest on and off.   Not in any pain right now but would like a call to discuss.

## 2025-01-17 NOTE — TELEPHONE ENCOUNTER
----- Message from Dr. Bailey Howard MD sent at 1/17/2025  2:14 PM EST -----  D dimer is a non specific marker of inflammation.  If negative, there is no pulmonary embolus. If positive, as is his, then should get an image to evaluate.  Recommend pulmonary embolism protocol chest CT (CTA).  If symptoms worse over weekend procee  d to ER.

## 2025-01-17 NOTE — TELEPHONE ENCOUNTER
I notified of MD response and he v/u.Does he still need CT to rule out PE since DDIMER was elevated?

## 2025-01-17 NOTE — TELEPHONE ENCOUNTER
Bailey Howard MD  You26 minutes ago (10:29 AM)     BM  If worse when breathing its probably pleuritic (viral, musculoskeletal), can get NT-pBNP, continue meds, try tylenol and proceed to ER if worse.  Also get a d-dimer, if that's elevated would send for chest CT to rule out pulmonary embolus.

## 2025-01-17 NOTE — TELEPHONE ENCOUNTER
Reports CP x 2 days. HX -S/p CABG, PAF, PVD. Had Cath on 07/31/24    Describes CP as a heavy feeling in his chest when he breathes. Worse when taking a deep breath.States he has COPD so he can't tell if SOB any worse than usual.     Took 1 NTG this AM. States it did help a little.     C/o BLE Edema- feet and legs. Denies weight gain. Thinks it may have gotten a little worse since starting Amlodipine.       Current /74  - Can't tell if hr irregular or not. States he has NOT taken his AM medications yet    Meds: Eliquis 5mg bid, ASA 81mg qd, Plavix 75mg qd, Amlodipine 2.5mg QD Cardura 8mg BID, Lisinopril 20mg BID, Lopressor 12mg 1/2 tab qd, Lasix 20mg qd, K+ 10meq's daily    Next scheduled appt 2/25/25

## 2025-01-17 NOTE — TELEPHONE ENCOUNTER
Bailey Howard MD  You31 minutes ago (2:19 PM)     BM  Yes see prior note and thanks!   Pt.notified of MD response and v/u.I placed order for CT PE protocol.I called radiology scheduling made appt.today 3:30 [facility].Pt.notified.    Orders Placed This Encounter   Procedures    CT CHEST PULMONARY EMBOLISM W CONTRAST     Standing Status:   Future     Standing Expiration Date:   1/17/2026     Order Specific Question:   Additional Contrast?     Answer:   Radiologist Recommendation     Order Specific Question:   STAT Creatinine as needed:     Answer:   Yes    Creatinine     Standing Status:   Future     Standing Expiration Date:   1/17/2026

## 2025-01-17 NOTE — TELEPHONE ENCOUNTER
----- Message from Dr. Bailey Howard MD sent at 1/17/2025  2:05 PM EST -----  Bnp is actually down from before, but with ankle edema and chest discomfort he can try taking double lasix with double potassium over the weekend, then resume usual dose.

## 2025-01-17 NOTE — TELEPHONE ENCOUNTER
Patient notified of Dr Romeo's response. Orders placed for Stat D-Dimer, and a BNP. Patient lives close to [facility] and will head there now to get the labs drawn.     I informed him that if the D-Dimer is elevated, we will send for a CT chest to r/o PE.

## 2025-02-20 NOTE — PROGRESS NOTES
hernia    Incarcerated incisional hernia    Atelectasis    Hypoxia    COPD (chronic obstructive pulmonary disease) (HCC)    Suspected sleep apnea    Postoperative visit    Paroxysmal atrial fibrillation (HCC)    Angina pectoris, unspecified    Atherosclerotic heart disease of native coronary artery with unspecified angina pectoris    Heart transplant status (Z94.1)          Past Surgical History:   Procedure Laterality Date    CABG, ARTERY-VEIN, TWO  2015    Dr. Obdulio Pimentel    CARDIAC CATHETERIZATION  2/2015 and 8/15    CARDIAC CATHETERIZATION  8/2015     [id number]    COLONOSCOPY N/A 10/22/2021    COLONOSCOPY/BMI 30 performed by Maverick Schwartz MD at [facility] ENDOSCOPY    COLONOSCOPY N/A 3/9/2018    COLONOSCOPY/ 28 performed by Maverick Schwartz MD at [facility] ENDOSCOPY    COLONOSCOPY  2012    COLSC FLX W/REMOVAL LESION BY HOT BX FORCEPS  10/22/2021         COLSC FLX W/REMOVAL LESION BY HOT BX FORCEPS  3/9/2018         CORONARY ANGIOPLASTY WITH STENT PLACEMENT  01/29/2021    Two 3.0 x 12 [place] drug-eluting stents    CORONARY ARTERY BYPASS GRAFT  2/15    DR. Pimentel    EGD TRANSORAL BIOPSY SINGLE/MULTIPLE  10/22/2021         FRACTURE SURGERY Right 2007    elbow fx    HERNIA REPAIR  1995    umbilical    HERNIA REPAIR Left unsure of 2nd surgery    LIH and redo embilical, Both Repairs at same time    HERNIA REPAIR N/A 6/23/2023    OPEN INCISIONAL HERNIA REPAIR WITH MESH performed by Saurabh Hurt Jr., MD at [facility] MAIN OR    KNEE ARTHROSCOPY Left 2006    left knee    LEFT HEART CATH,PERCUTANEOUS  01/29/2021    angioplasty and stenting of the two high-grade lesions in the circumflex    ORTHOPEDIC SURGERY Right 2007    right elbow fx    VEIN SURGERY             Social History     Socioeconomic History    Marital status: Single     Spouse name: Not on file    Number of children: Not on file    Years of education: Not on file    Highest education level: Not on file   Occupational History    Not on file   Tobacco Use

## 2025-02-21 ENCOUNTER — OFFICE VISIT (OUTPATIENT)
Dept: SLEEP MEDICINE | Age: 80
End: 2025-02-21
Payer: MEDICARE

## 2025-02-21 VITALS
DIASTOLIC BLOOD PRESSURE: 85 MMHG | WEIGHT: 212.8 LBS | OXYGEN SATURATION: 99 % | TEMPERATURE: 97.5 F | HEART RATE: 65 BPM | BODY MASS INDEX: 31.52 KG/M2 | RESPIRATION RATE: 16 BRPM | HEIGHT: 69 IN | SYSTOLIC BLOOD PRESSURE: 145 MMHG

## 2025-02-21 DIAGNOSIS — G47.34 NOCTURNAL HYPOXEMIA: ICD-10-CM

## 2025-02-21 DIAGNOSIS — G47.33 OSA (OBSTRUCTIVE SLEEP APNEA): Primary | ICD-10-CM

## 2025-02-21 PROCEDURE — 1159F MED LIST DOCD IN RCRD: CPT | Performed by: NURSE PRACTITIONER

## 2025-02-21 PROCEDURE — G8417 CALC BMI ABV UP PARAM F/U: HCPCS | Performed by: NURSE PRACTITIONER

## 2025-02-21 PROCEDURE — 1160F RVW MEDS BY RX/DR IN RCRD: CPT | Performed by: NURSE PRACTITIONER

## 2025-02-21 PROCEDURE — G8427 DOCREV CUR MEDS BY ELIG CLIN: HCPCS | Performed by: NURSE PRACTITIONER

## 2025-02-21 PROCEDURE — 3077F SYST BP >= 140 MM HG: CPT | Performed by: NURSE PRACTITIONER

## 2025-02-21 PROCEDURE — 1123F ACP DISCUSS/DSCN MKR DOCD: CPT | Performed by: NURSE PRACTITIONER

## 2025-02-21 PROCEDURE — 1036F TOBACCO NON-USER: CPT | Performed by: NURSE PRACTITIONER

## 2025-02-21 PROCEDURE — 3079F DIAST BP 80-89 MM HG: CPT | Performed by: NURSE PRACTITIONER

## 2025-02-21 PROCEDURE — 99213 OFFICE O/P EST LOW 20 MIN: CPT | Performed by: NURSE PRACTITIONER

## 2025-02-21 ASSESSMENT — SLEEP AND FATIGUE QUESTIONNAIRES
HOW LIKELY ARE YOU TO NOD OFF OR FALL ASLEEP WHILE SITTING INACTIVE IN A PUBLIC PLACE: WOULD NEVER DOZE
HOW LIKELY ARE YOU TO NOD OFF OR FALL ASLEEP WHILE WATCHING TV: SLIGHT CHANCE OF DOZING
HOW LIKELY ARE YOU TO NOD OFF OR FALL ASLEEP WHEN YOU ARE A PASSENGER IN A CAR FOR AN HOUR WITHOUT A BREAK: WOULD NEVER DOZE
HOW LIKELY ARE YOU TO NOD OFF OR FALL ASLEEP IN A CAR, WHILE STOPPED FOR A FEW MINUTES IN TRAFFIC: WOULD NEVER DOZE
HOW LIKELY ARE YOU TO NOD OFF OR FALL ASLEEP WHILE LYING DOWN TO REST IN THE AFTERNOON WHEN CIRCUMSTANCES PERMIT: WOULD NEVER DOZE
HOW LIKELY ARE YOU TO NOD OFF OR FALL ASLEEP WHILE SITTING QUIETLY AFTER LUNCH WITHOUT ALCOHOL: WOULD NEVER DOZE
HOW LIKELY ARE YOU TO NOD OFF OR FALL ASLEEP WHILE SITTING AND TALKING TO SOMEONE: WOULD NEVER DOZE
ESS TOTAL SCORE: 2
HOW LIKELY ARE YOU TO NOD OFF OR FALL ASLEEP WHILE SITTING AND READING: SLIGHT CHANCE OF DOZING

## 2025-02-24 NOTE — PROGRESS NOTES
[facility] CARDIOLOGY  [address] [place] [address], [address]  [place] [address]  PHONE: [phone]      25    NAME:  Tadeo Griffiths Sr.  : 1945  MRN: [id number]         SUBJECTIVE:   Tadeo Griffiths Sr. is a 80 y.o. male seen for a follow up visit regarding the following:     Chief Complaint   Patient presents with    6 Month Follow-Up    Coronary Artery Disease            HPI:  Follow up  6 Month Follow-Up and Coronary Artery Disease   .    Follow up CAD , prior CABG, post op atrial flutter s/p ablation, EP stopped AC at that time, noted to be in afib at visit in 2024 following a prolonged absence from cardiology care.  Multifactorial and enigmatic dyspnea did not respond at all to intracoronary stent, loop diuretics, had mostly negative evaluation for ATTR-CM, normal strain pattern on echo, normal EKG voltage chronic left hemidiaphragm elevation, hormone therapy for prostate cancer, sees pulmonary with severe COPD, GINNA on CPAP. Chronic ankle edema, peripheral neuropathy and progressively worsening dyspnea. Vascular duplex confirmed neurogenic claudication. Signs pointing toward his COPD and deconditioning.     It appears pulmonary agrees.  He remains short of breath.  [place].  No angina.  Continues to struggle with his back pain and neuropathy.               Past cardiac history:   2/19/15 (Dr Pimentel) Coronary artery bypass grafting x2 with grafts   consisting of bilateral mammaries   Aug 2015 - flutter ablation (Dr Rivera)   2017 - Stress MPI - normal perfusion and LVEF in stage III with frequent PVC, couplet and triplets.   Sep 2020- Right peroneal artery occlusion.  The remaining lower extremity arteries are patent w/o  high-grade stenosis.     Oct 2020- biphasic arterial flow (Dr Sigifredo Pimentel)     2021- inferoapical scar and large anterolateral ischemia with normal wall motion and EF   Echo - mild LVH, Normal SF and DF   LHC -

## 2025-02-25 ENCOUNTER — OFFICE VISIT (OUTPATIENT)
Age: 80
End: 2025-02-25
Payer: MEDICARE

## 2025-02-25 VITALS
DIASTOLIC BLOOD PRESSURE: 80 MMHG | BODY MASS INDEX: 31.4 KG/M2 | SYSTOLIC BLOOD PRESSURE: 130 MMHG | WEIGHT: 212 LBS | HEIGHT: 69 IN | HEART RATE: 70 BPM

## 2025-02-25 DIAGNOSIS — E87.6 HYPOKALEMIA: ICD-10-CM

## 2025-02-25 DIAGNOSIS — Z95.1 HX OF CABG: ICD-10-CM

## 2025-02-25 DIAGNOSIS — J98.6 ACQUIRED ELEVATED DIAPHRAGM: ICD-10-CM

## 2025-02-25 DIAGNOSIS — I48.3 TYPICAL ATRIAL FLUTTER (HCC): Primary | ICD-10-CM

## 2025-02-25 DIAGNOSIS — J43.1 PANLOBULAR EMPHYSEMA (HCC): ICD-10-CM

## 2025-02-25 DIAGNOSIS — I25.118 CORONARY ARTERY DISEASE OF NATIVE ARTERY OF NATIVE HEART WITH STABLE ANGINA PECTORIS: ICD-10-CM

## 2025-02-25 PROCEDURE — G8427 DOCREV CUR MEDS BY ELIG CLIN: HCPCS | Performed by: INTERNAL MEDICINE

## 2025-02-25 PROCEDURE — 99214 OFFICE O/P EST MOD 30 MIN: CPT | Performed by: INTERNAL MEDICINE

## 2025-02-25 PROCEDURE — 1036F TOBACCO NON-USER: CPT | Performed by: INTERNAL MEDICINE

## 2025-02-25 PROCEDURE — 1126F AMNT PAIN NOTED NONE PRSNT: CPT | Performed by: INTERNAL MEDICINE

## 2025-02-25 PROCEDURE — 3075F SYST BP GE 130 - 139MM HG: CPT | Performed by: INTERNAL MEDICINE

## 2025-02-25 PROCEDURE — 3023F SPIROM DOC REV: CPT | Performed by: INTERNAL MEDICINE

## 2025-02-25 PROCEDURE — 1123F ACP DISCUSS/DSCN MKR DOCD: CPT | Performed by: INTERNAL MEDICINE

## 2025-02-25 PROCEDURE — 1159F MED LIST DOCD IN RCRD: CPT | Performed by: INTERNAL MEDICINE

## 2025-02-25 PROCEDURE — 1160F RVW MEDS BY RX/DR IN RCRD: CPT | Performed by: INTERNAL MEDICINE

## 2025-02-25 PROCEDURE — 3079F DIAST BP 80-89 MM HG: CPT | Performed by: INTERNAL MEDICINE

## 2025-02-25 PROCEDURE — G8417 CALC BMI ABV UP PARAM F/U: HCPCS | Performed by: INTERNAL MEDICINE

## 2025-02-25 RX ORDER — POTASSIUM CHLORIDE 750 MG/1
10 TABLET, EXTENDED RELEASE ORAL DAILY
Qty: 90 TABLET | Refills: 3 | Status: SHIPPED | OUTPATIENT
Start: 2025-02-25

## 2025-02-25 ASSESSMENT — ENCOUNTER SYMPTOMS
BACK PAIN: 1
SHORTNESS OF BREATH: 1

## 2025-03-05 NOTE — TELEPHONE ENCOUNTER
Patient currently takes 4 pills a day 20 mg 2 bid would like to switch it so he only takes 1 bid. Can we increase to 40 mg bid?

## 2025-03-06 RX ORDER — OMEPRAZOLE 40 MG/1
40 CAPSULE, DELAYED RELEASE ORAL 2 TIMES DAILY
Qty: 180 CAPSULE | Refills: 3 | Status: SHIPPED | OUTPATIENT
Start: 2025-03-06

## 2025-05-09 ENCOUNTER — TELEPHONE (OUTPATIENT)
Age: 80
End: 2025-05-09

## 2025-05-09 NOTE — TELEPHONE ENCOUNTER
Pt is calling to see if he can come get samples of his med Apixaban. He took his last tab last night. He was advised by [facility] to get some till his refill gets there. Please call.      MEDICATION REFILL REQUEST          Name of Medication:  Apixaban  Dose:  5mg  Frequency:  twice daily  Quantity:  1 tab  Days' supply:  90          Pharmacy Name/Location:  [facility]    [facility] informed pt that this is his last refill before needing a new rx for this.

## 2025-05-09 NOTE — TELEPHONE ENCOUNTER
He is calling back Said he is OUT and has none to take today Also needs new RX called to [facility] Please call ASAP and let him know he said

## 2025-05-29 DIAGNOSIS — I10 ESSENTIAL HYPERTENSION WITH GOAL BLOOD PRESSURE LESS THAN 140/90: Primary | ICD-10-CM

## 2025-05-29 DIAGNOSIS — E87.6 HYPOKALEMIA: ICD-10-CM

## 2025-05-29 DIAGNOSIS — E78.00 PURE HYPERCHOLESTEROLEMIA: ICD-10-CM

## 2025-06-06 ENCOUNTER — LAB (OUTPATIENT)
Dept: FAMILY MEDICINE CLINIC | Facility: CLINIC | Age: 80
End: 2025-06-06

## 2025-06-06 DIAGNOSIS — E78.00 PURE HYPERCHOLESTEROLEMIA: ICD-10-CM

## 2025-06-06 DIAGNOSIS — I10 ESSENTIAL HYPERTENSION WITH GOAL BLOOD PRESSURE LESS THAN 140/90: ICD-10-CM

## 2025-06-06 DIAGNOSIS — E87.6 HYPOKALEMIA: ICD-10-CM

## 2025-06-06 LAB
ALBUMIN SERPL-MCNC: 3.6 G/DL (ref 3.2–4.6)
ALBUMIN/GLOB SERPL: 1.4 (ref 1–1.9)
ALP SERPL-CCNC: 67 U/L (ref 40–129)
ALT SERPL-CCNC: 14 U/L (ref 8–55)
ANION GAP SERPL CALC-SCNC: 10 MMOL/L (ref 7–16)
AST SERPL-CCNC: 21 U/L (ref 15–37)
BASOPHILS # BLD: 0.01 K/UL (ref 0–0.2)
BASOPHILS NFR BLD: 0.3 % (ref 0–2)
BILIRUB SERPL-MCNC: 0.5 MG/DL (ref 0–1.2)
BUN SERPL-MCNC: 13 MG/DL (ref 8–23)
CALCIUM SERPL-MCNC: 9.1 MG/DL (ref 8.8–10.2)
CHLORIDE SERPL-SCNC: 100 MMOL/L (ref 98–107)
CHOLEST SERPL-MCNC: 151 MG/DL (ref 0–200)
CO2 SERPL-SCNC: 27 MMOL/L (ref 20–29)
CREAT SERPL-MCNC: 0.78 MG/DL (ref 0.8–1.3)
DIFFERENTIAL METHOD BLD: ABNORMAL
EOSINOPHIL # BLD: 0.06 K/UL (ref 0–0.8)
EOSINOPHIL NFR BLD: 1.8 % (ref 0.5–7.8)
ERYTHROCYTE [DISTWIDTH] IN BLOOD BY AUTOMATED COUNT: 14.1 % (ref 11.9–14.6)
GLOBULIN SER CALC-MCNC: 2.7 G/DL (ref 2.3–3.5)
GLUCOSE SERPL-MCNC: 94 MG/DL (ref 70–99)
HCT VFR BLD AUTO: 38.6 % (ref 41.1–50.3)
HDLC SERPL-MCNC: 61 MG/DL (ref 40–60)
HDLC SERPL: 2.5 (ref 0–5)
HGB BLD-MCNC: 12.1 G/DL (ref 13.6–17.2)
IMM GRANULOCYTES # BLD AUTO: 0.01 K/UL (ref 0–0.5)
IMM GRANULOCYTES NFR BLD AUTO: 0.3 % (ref 0–5)
LDLC SERPL CALC-MCNC: 80 MG/DL (ref 0–100)
LYMPHOCYTES # BLD: 0.57 K/UL (ref 0.5–4.6)
LYMPHOCYTES NFR BLD: 16.8 % (ref 13–44)
MCH RBC QN AUTO: 29.7 PG (ref 26.1–32.9)
MCHC RBC AUTO-ENTMCNC: 31.3 G/DL (ref 31.4–35)
MCV RBC AUTO: 94.8 FL (ref 82–102)
MONOCYTES # BLD: 0.58 K/UL (ref 0.1–1.3)
MONOCYTES NFR BLD: 17.1 % (ref 4–12)
NEUTS SEG # BLD: 2.17 K/UL (ref 1.7–8.2)
NEUTS SEG NFR BLD: 63.7 % (ref 43–78)
NRBC # BLD: 0 K/UL (ref 0–0.2)
PLATELET # BLD AUTO: 132 K/UL (ref 150–450)
PMV BLD AUTO: 9.7 FL (ref 9.4–12.3)
POTASSIUM SERPL-SCNC: 4.1 MMOL/L (ref 3.5–5.1)
PROT SERPL-MCNC: 6.3 G/DL (ref 6.3–8.2)
RBC # BLD AUTO: 4.07 M/UL (ref 4.23–5.6)
SODIUM SERPL-SCNC: 137 MMOL/L (ref 136–145)
TRIGL SERPL-MCNC: 48 MG/DL (ref 0–150)
VLDLC SERPL CALC-MCNC: 10 MG/DL (ref 6–23)
WBC # BLD AUTO: 3.4 K/UL (ref 4.3–11.1)

## 2025-06-10 ENCOUNTER — HOSPITAL ENCOUNTER (INPATIENT)
Age: 80
LOS: 3 days | Discharge: HOME | End: 2025-06-14
Attending: STUDENT IN AN ORGANIZED HEALTH CARE EDUCATION/TRAINING PROGRAM | Admitting: INTERNAL MEDICINE
Payer: MEDICARE

## 2025-06-10 ENCOUNTER — APPOINTMENT (OUTPATIENT)
Dept: CT IMAGING | Age: 80
End: 2025-06-10
Payer: MEDICARE

## 2025-06-10 DIAGNOSIS — D64.9 ANEMIA, UNSPECIFIED TYPE: ICD-10-CM

## 2025-06-10 DIAGNOSIS — K62.5 RECTAL BLEEDING: ICD-10-CM

## 2025-06-10 DIAGNOSIS — I10 ESSENTIAL HYPERTENSION WITH GOAL BLOOD PRESSURE LESS THAN 140/90: ICD-10-CM

## 2025-06-10 DIAGNOSIS — K92.2 LOWER GI BLEED: Primary | ICD-10-CM

## 2025-06-10 LAB
ABO + RH BLD: NORMAL
ALBUMIN SERPL-MCNC: 3.3 G/DL (ref 3.2–4.6)
ALBUMIN/GLOB SERPL: 1 (ref 1–1.9)
ALP SERPL-CCNC: 59 U/L (ref 40–129)
ALT SERPL-CCNC: 14 U/L (ref 8–55)
ANION GAP SERPL CALC-SCNC: 9 MMOL/L (ref 7–16)
AST SERPL-CCNC: 31 U/L (ref 15–37)
BASOPHILS # BLD: 0.02 K/UL (ref 0–0.2)
BASOPHILS NFR BLD: 0.5 % (ref 0–2)
BILIRUB SERPL-MCNC: 0.6 MG/DL (ref 0–1.2)
BLOOD GROUP ANTIBODIES SERPL: NORMAL
BUN SERPL-MCNC: 16 MG/DL (ref 8–23)
CALCIUM SERPL-MCNC: 9 MG/DL (ref 8.8–10.2)
CHLORIDE SERPL-SCNC: 96 MMOL/L (ref 98–107)
CO2 SERPL-SCNC: 26 MMOL/L (ref 20–29)
CREAT SERPL-MCNC: 0.76 MG/DL (ref 0.8–1.3)
DIFFERENTIAL METHOD BLD: ABNORMAL
EOSINOPHIL # BLD: 0.03 K/UL (ref 0–0.8)
EOSINOPHIL NFR BLD: 0.7 % (ref 0.5–7.8)
ERYTHROCYTE [DISTWIDTH] IN BLOOD BY AUTOMATED COUNT: 14 % (ref 11.9–14.6)
GLOBULIN SER CALC-MCNC: 3.3 G/DL (ref 2.3–3.5)
GLUCOSE SERPL-MCNC: 113 MG/DL (ref 70–99)
HCT VFR BLD AUTO: 33.2 % (ref 41.1–50.3)
HGB BLD-MCNC: 11.4 G/DL (ref 13.6–17.2)
IMM GRANULOCYTES # BLD AUTO: 0.02 K/UL (ref 0–0.5)
IMM GRANULOCYTES NFR BLD AUTO: 0.5 % (ref 0–5)
INR PPP: 1.4
LYMPHOCYTES # BLD: 0.58 K/UL (ref 0.5–4.6)
LYMPHOCYTES NFR BLD: 13.9 % (ref 13–44)
MCH RBC QN AUTO: 30.2 PG (ref 26.1–32.9)
MCHC RBC AUTO-ENTMCNC: 34.3 G/DL (ref 31.4–35)
MCV RBC AUTO: 88.1 FL (ref 82–102)
MONOCYTES # BLD: 0.73 K/UL (ref 0.1–1.3)
MONOCYTES NFR BLD: 17.5 % (ref 4–12)
NEUTS SEG # BLD: 2.8 K/UL (ref 1.7–8.2)
NEUTS SEG NFR BLD: 66.9 % (ref 43–78)
NRBC # BLD: 0 K/UL (ref 0–0.2)
PLATELET # BLD AUTO: 139 K/UL (ref 150–450)
PMV BLD AUTO: 9 FL (ref 9.4–12.3)
POTASSIUM SERPL-SCNC: 3.6 MMOL/L (ref 3.5–5.1)
PROT SERPL-MCNC: 6.7 G/DL (ref 6.3–8.2)
PROTHROMBIN TIME: 17.1 SEC (ref 11.3–14.9)
RBC # BLD AUTO: 3.77 M/UL (ref 4.23–5.6)
SODIUM SERPL-SCNC: 131 MMOL/L (ref 136–145)
SPECIMEN EXP DATE BLD: NORMAL
WBC # BLD AUTO: 4.2 K/UL (ref 4.3–11.1)

## 2025-06-10 PROCEDURE — 80053 COMPREHEN METABOLIC PANEL: CPT

## 2025-06-10 PROCEDURE — 99285 EMERGENCY DEPT VISIT HI MDM: CPT

## 2025-06-10 PROCEDURE — 85610 PROTHROMBIN TIME: CPT

## 2025-06-10 PROCEDURE — 86900 BLOOD TYPING SEROLOGIC ABO: CPT

## 2025-06-10 PROCEDURE — 86850 RBC ANTIBODY SCREEN: CPT

## 2025-06-10 PROCEDURE — 2580000003 HC RX 258: Performed by: STUDENT IN AN ORGANIZED HEALTH CARE EDUCATION/TRAINING PROGRAM

## 2025-06-10 PROCEDURE — 85025 COMPLETE CBC W/AUTO DIFF WBC: CPT

## 2025-06-10 PROCEDURE — 74177 CT ABD & PELVIS W/CONTRAST: CPT

## 2025-06-10 PROCEDURE — 6360000004 HC RX CONTRAST MEDICATION: Performed by: STUDENT IN AN ORGANIZED HEALTH CARE EDUCATION/TRAINING PROGRAM

## 2025-06-10 PROCEDURE — 86901 BLOOD TYPING SEROLOGIC RH(D): CPT

## 2025-06-10 RX ORDER — HYDROCODONE BITARTRATE AND ACETAMINOPHEN 7.5; 325 MG/1; MG/1
1 TABLET ORAL
Refills: 0 | Status: DISCONTINUED | OUTPATIENT
Start: 2025-06-10 | End: 2025-06-10

## 2025-06-10 RX ORDER — IOPAMIDOL 755 MG/ML
100 INJECTION, SOLUTION INTRAVASCULAR
Status: COMPLETED | OUTPATIENT
Start: 2025-06-10 | End: 2025-06-10

## 2025-06-10 RX ORDER — 0.9 % SODIUM CHLORIDE 0.9 %
1000 INTRAVENOUS SOLUTION INTRAVENOUS
Status: COMPLETED | OUTPATIENT
Start: 2025-06-10 | End: 2025-06-11

## 2025-06-10 RX ADMIN — SODIUM CHLORIDE 1000 ML: 0.9 INJECTION, SOLUTION INTRAVENOUS at 22:59

## 2025-06-10 RX ADMIN — IOPAMIDOL 100 ML: 755 INJECTION, SOLUTION INTRAVENOUS at 22:26

## 2025-06-10 ASSESSMENT — LIFESTYLE VARIABLES
HOW OFTEN DO YOU HAVE A DRINK CONTAINING ALCOHOL: NEVER
HOW MANY STANDARD DRINKS CONTAINING ALCOHOL DO YOU HAVE ON A TYPICAL DAY: PATIENT DOES NOT DRINK

## 2025-06-10 ASSESSMENT — PAIN - FUNCTIONAL ASSESSMENT: PAIN_FUNCTIONAL_ASSESSMENT: NONE - DENIES PAIN

## 2025-06-11 ENCOUNTER — ANESTHESIA EVENT (OUTPATIENT)
Dept: ENDOSCOPY | Age: 80
End: 2025-06-11
Payer: MEDICARE

## 2025-06-11 PROBLEM — K62.5 RECTAL BLEEDING: Status: ACTIVE | Noted: 2025-06-10

## 2025-06-11 PROBLEM — K92.2 LGI BLEED: Status: ACTIVE | Noted: 2025-06-11

## 2025-06-11 PROBLEM — E87.6 HYPOKALEMIA: Status: ACTIVE | Noted: 2025-06-11

## 2025-06-11 LAB
ANION GAP SERPL CALC-SCNC: 9 MMOL/L (ref 7–16)
APPEARANCE UR: CLEAR
BACTERIA URNS QL MICRO: NEGATIVE /HPF
BILIRUB UR QL: NEGATIVE
BUN SERPL-MCNC: 11 MG/DL (ref 8–23)
CALCIUM SERPL-MCNC: 8.6 MG/DL (ref 8.8–10.2)
CASTS URNS QL MICRO: 0 /LPF
CHLORIDE SERPL-SCNC: 99 MMOL/L (ref 98–107)
CO2 SERPL-SCNC: 26 MMOL/L (ref 20–29)
COLOR UR: ABNORMAL
CREAT SERPL-MCNC: 0.66 MG/DL (ref 0.8–1.3)
CRYSTALS URNS QL MICRO: 0 /LPF
EPI CELLS #/AREA URNS HPF: ABNORMAL /HPF
GLUCOSE SERPL-MCNC: 86 MG/DL (ref 70–99)
GLUCOSE UR STRIP.AUTO-MCNC: NEGATIVE MG/DL
HCT VFR BLD AUTO: 30.5 % (ref 41.1–50.3)
HCT VFR BLD AUTO: 32.5 % (ref 41.1–50.3)
HGB BLD-MCNC: 10.3 G/DL (ref 13.6–17.2)
HGB BLD-MCNC: 10.9 G/DL (ref 13.6–17.2)
HGB UR QL STRIP: ABNORMAL
HYALINE CASTS URNS QL MICRO: ABNORMAL /LPF
IRON SATN MFR SERPL: 41 % (ref 20–50)
IRON SERPL-MCNC: 57 UG/DL (ref 35–100)
KETONES UR QL STRIP.AUTO: NEGATIVE MG/DL
LEUKOCYTE ESTERASE UR QL STRIP.AUTO: NEGATIVE
MAGNESIUM SERPL-MCNC: 1.9 MG/DL (ref 1.8–2.4)
MUCOUS THREADS URNS QL MICRO: 0 /LPF
NITRITE UR QL STRIP.AUTO: NEGATIVE
PH UR STRIP: 7 (ref 5–9)
POTASSIUM SERPL-SCNC: 3.3 MMOL/L (ref 3.5–5.1)
PROT UR STRIP-MCNC: NEGATIVE MG/DL
RBC #/AREA URNS HPF: ABNORMAL /HPF
SODIUM SERPL-SCNC: 135 MMOL/L (ref 136–145)
SP GR UR REFRACTOMETRY: 1.01 (ref 1–1.02)
TIBC SERPL-MCNC: 139 UG/DL (ref 240–450)
TRANSFERRIN SERPL-MCNC: 138 MG/DL (ref 200–360)
UIBC SERPL-MCNC: 81.4 UG/DL (ref 112–347)
URINE CULTURE IF INDICATED: ABNORMAL
UROBILINOGEN UR QL STRIP.AUTO: 0.2 EU/DL (ref 0.2–1)
WBC URNS QL MICRO: ABNORMAL /HPF

## 2025-06-11 PROCEDURE — 2580000003 HC RX 258: Performed by: INTERNAL MEDICINE

## 2025-06-11 PROCEDURE — 6360000002 HC RX W HCPCS: Performed by: INTERNAL MEDICINE

## 2025-06-11 PROCEDURE — 94660 CPAP INITIATION&MGMT: CPT

## 2025-06-11 PROCEDURE — 81001 URINALYSIS AUTO W/SCOPE: CPT

## 2025-06-11 PROCEDURE — 85014 HEMATOCRIT: CPT

## 2025-06-11 PROCEDURE — 36415 COLL VENOUS BLD VENIPUNCTURE: CPT

## 2025-06-11 PROCEDURE — 6370000000 HC RX 637 (ALT 250 FOR IP): Performed by: INTERNAL MEDICINE

## 2025-06-11 PROCEDURE — 1100000003 HC PRIVATE W/ TELEMETRY

## 2025-06-11 PROCEDURE — 85018 HEMOGLOBIN: CPT

## 2025-06-11 PROCEDURE — 2500000003 HC RX 250 WO HCPCS: Performed by: INTERNAL MEDICINE

## 2025-06-11 PROCEDURE — 1170000001 HC RM PRIVATE ONCOLOGY W/TELEMETRY

## 2025-06-11 PROCEDURE — 94640 AIRWAY INHALATION TREATMENT: CPT

## 2025-06-11 PROCEDURE — 80048 BASIC METABOLIC PNL TOTAL CA: CPT

## 2025-06-11 PROCEDURE — 6370000000 HC RX 637 (ALT 250 FOR IP): Performed by: FAMILY MEDICINE

## 2025-06-11 PROCEDURE — 84466 ASSAY OF TRANSFERRIN: CPT

## 2025-06-11 PROCEDURE — 99222 1ST HOSP IP/OBS MODERATE 55: CPT | Performed by: INTERNAL MEDICINE

## 2025-06-11 PROCEDURE — 2500000003 HC RX 250 WO HCPCS: Performed by: FAMILY MEDICINE

## 2025-06-11 PROCEDURE — 94760 N-INVAS EAR/PLS OXIMETRY 1: CPT

## 2025-06-11 PROCEDURE — 94761 N-INVAS EAR/PLS OXIMETRY MLT: CPT

## 2025-06-11 PROCEDURE — 83540 ASSAY OF IRON: CPT

## 2025-06-11 PROCEDURE — 6360000002 HC RX W HCPCS: Performed by: FAMILY MEDICINE

## 2025-06-11 PROCEDURE — 83735 ASSAY OF MAGNESIUM: CPT

## 2025-06-11 RX ORDER — SODIUM CHLORIDE 0.9 % (FLUSH) 0.9 %
5-40 SYRINGE (ML) INJECTION PRN
Status: DISCONTINUED | OUTPATIENT
Start: 2025-06-11 | End: 2025-06-14 | Stop reason: HOSPADM

## 2025-06-11 RX ORDER — MULTIVITAMIN WITH IRON
1 TABLET ORAL DAILY
Status: DISCONTINUED | OUTPATIENT
Start: 2025-06-11 | End: 2025-06-14 | Stop reason: HOSPADM

## 2025-06-11 RX ORDER — MULTIVIT WITH MINERALS/LUTEIN
500 TABLET ORAL DAILY
Status: DISCONTINUED | OUTPATIENT
Start: 2025-06-11 | End: 2025-06-14 | Stop reason: HOSPADM

## 2025-06-11 RX ORDER — DOXAZOSIN 4 MG/1
8 TABLET ORAL 2 TIMES DAILY
Status: DISCONTINUED | OUTPATIENT
Start: 2025-06-11 | End: 2025-06-14 | Stop reason: HOSPADM

## 2025-06-11 RX ORDER — ONDANSETRON 2 MG/ML
4 INJECTION INTRAMUSCULAR; INTRAVENOUS EVERY 6 HOURS PRN
Status: DISCONTINUED | OUTPATIENT
Start: 2025-06-11 | End: 2025-06-14 | Stop reason: HOSPADM

## 2025-06-11 RX ORDER — ACETAMINOPHEN 650 MG/1
650 SUPPOSITORY RECTAL EVERY 6 HOURS PRN
Status: DISCONTINUED | OUTPATIENT
Start: 2025-06-11 | End: 2025-06-14 | Stop reason: HOSPADM

## 2025-06-11 RX ORDER — ASPIRIN 81 MG/1
81 TABLET ORAL DAILY
Status: DISCONTINUED | OUTPATIENT
Start: 2025-06-11 | End: 2025-06-14 | Stop reason: HOSPADM

## 2025-06-11 RX ORDER — ZINC SULFATE 50(220)MG
50 CAPSULE ORAL DAILY
Status: DISCONTINUED | OUTPATIENT
Start: 2025-06-11 | End: 2025-06-14 | Stop reason: HOSPADM

## 2025-06-11 RX ORDER — OMEGA-3/DHA/EPA/FISH OIL 300-1000MG
1 CAPSULE ORAL DAILY
Status: DISCONTINUED | OUTPATIENT
Start: 2025-06-11 | End: 2025-06-11 | Stop reason: RX

## 2025-06-11 RX ORDER — SODIUM CHLORIDE 0.9 % (FLUSH) 0.9 %
5-40 SYRINGE (ML) INJECTION EVERY 12 HOURS SCHEDULED
Status: CANCELLED | OUTPATIENT
Start: 2025-06-11

## 2025-06-11 RX ORDER — BUDESONIDE 0.5 MG/2ML
0.5 INHALANT ORAL
Status: DISCONTINUED | OUTPATIENT
Start: 2025-06-11 | End: 2025-06-14 | Stop reason: HOSPADM

## 2025-06-11 RX ORDER — VITAMIN B COMPLEX
100 TABLET ORAL DAILY
Status: DISCONTINUED | OUTPATIENT
Start: 2025-06-11 | End: 2025-06-11 | Stop reason: RX

## 2025-06-11 RX ORDER — IBUPROFEN 600 MG/1
1 TABLET ORAL PRN
Status: CANCELLED | OUTPATIENT
Start: 2025-06-11

## 2025-06-11 RX ORDER — AMLODIPINE BESYLATE 5 MG/1
2.5 TABLET ORAL DAILY
Status: DISCONTINUED | OUTPATIENT
Start: 2025-06-11 | End: 2025-06-14 | Stop reason: HOSPADM

## 2025-06-11 RX ORDER — LISINOPRIL 20 MG/1
20 TABLET ORAL 2 TIMES DAILY
Status: DISCONTINUED | OUTPATIENT
Start: 2025-06-11 | End: 2025-06-14 | Stop reason: HOSPADM

## 2025-06-11 RX ORDER — PANTOPRAZOLE SODIUM 40 MG/1
40 TABLET, DELAYED RELEASE ORAL
Status: DISCONTINUED | OUTPATIENT
Start: 2025-06-11 | End: 2025-06-11 | Stop reason: SDUPTHER

## 2025-06-11 RX ORDER — CLOPIDOGREL BISULFATE 75 MG/1
75 TABLET ORAL DAILY
Status: DISCONTINUED | OUTPATIENT
Start: 2025-06-11 | End: 2025-06-14 | Stop reason: HOSPADM

## 2025-06-11 RX ORDER — ACETAMINOPHEN 325 MG/1
650 TABLET ORAL EVERY 6 HOURS PRN
Status: DISCONTINUED | OUTPATIENT
Start: 2025-06-11 | End: 2025-06-14 | Stop reason: HOSPADM

## 2025-06-11 RX ORDER — SODIUM CHLORIDE 0.9 % (FLUSH) 0.9 %
5-40 SYRINGE (ML) INJECTION EVERY 12 HOURS SCHEDULED
Status: DISCONTINUED | OUTPATIENT
Start: 2025-06-11 | End: 2025-06-14 | Stop reason: HOSPADM

## 2025-06-11 RX ORDER — BUDESONIDE AND FORMOTEROL FUMARATE DIHYDRATE 160; 4.5 UG/1; UG/1
2 AEROSOL RESPIRATORY (INHALATION)
Status: DISCONTINUED | OUTPATIENT
Start: 2025-06-11 | End: 2025-06-11 | Stop reason: ALTCHOICE

## 2025-06-11 RX ORDER — ALBUTEROL SULFATE 0.83 MG/ML
2.5 SOLUTION RESPIRATORY (INHALATION) 4 TIMES DAILY PRN
Status: DISCONTINUED | OUTPATIENT
Start: 2025-06-11 | End: 2025-06-14 | Stop reason: HOSPADM

## 2025-06-11 RX ORDER — ACETAMINOPHEN 500 MG
500 TABLET ORAL EVERY 4 HOURS PRN
Status: DISCONTINUED | OUTPATIENT
Start: 2025-06-11 | End: 2025-06-11 | Stop reason: SDUPTHER

## 2025-06-11 RX ORDER — PRAVASTATIN SODIUM 20 MG
80 TABLET ORAL DAILY
Status: DISCONTINUED | OUTPATIENT
Start: 2025-06-11 | End: 2025-06-14 | Stop reason: HOSPADM

## 2025-06-11 RX ORDER — SODIUM CHLORIDE, SODIUM LACTATE, POTASSIUM CHLORIDE, CALCIUM CHLORIDE 600; 310; 30; 20 MG/100ML; MG/100ML; MG/100ML; MG/100ML
INJECTION, SOLUTION INTRAVENOUS CONTINUOUS
Status: ACTIVE | OUTPATIENT
Start: 2025-06-11 | End: 2025-06-12

## 2025-06-11 RX ORDER — FERROUS SULFATE 325(65) MG
325 TABLET ORAL
Status: DISCONTINUED | OUTPATIENT
Start: 2025-06-11 | End: 2025-06-14 | Stop reason: HOSPADM

## 2025-06-11 RX ORDER — NALOXONE HYDROCHLORIDE 0.4 MG/ML
INJECTION, SOLUTION INTRAMUSCULAR; INTRAVENOUS; SUBCUTANEOUS PRN
Status: CANCELLED | OUTPATIENT
Start: 2025-06-11

## 2025-06-11 RX ORDER — NITROGLYCERIN 0.4 MG/1
0.4 TABLET SUBLINGUAL EVERY 5 MIN PRN
Status: DISCONTINUED | OUTPATIENT
Start: 2025-06-11 | End: 2025-06-14 | Stop reason: HOSPADM

## 2025-06-11 RX ORDER — SODIUM CHLORIDE 0.9 % (FLUSH) 0.9 %
5-40 SYRINGE (ML) INJECTION PRN
Status: CANCELLED | OUTPATIENT
Start: 2025-06-11

## 2025-06-11 RX ORDER — SODIUM CHLORIDE, SODIUM LACTATE, POTASSIUM CHLORIDE, CALCIUM CHLORIDE 600; 310; 30; 20 MG/100ML; MG/100ML; MG/100ML; MG/100ML
INJECTION, SOLUTION INTRAVENOUS CONTINUOUS
Status: CANCELLED | OUTPATIENT
Start: 2025-06-11

## 2025-06-11 RX ORDER — DEXTROSE MONOHYDRATE 100 MG/ML
INJECTION, SOLUTION INTRAVENOUS CONTINUOUS PRN
Status: CANCELLED | OUTPATIENT
Start: 2025-06-11

## 2025-06-11 RX ORDER — SODIUM CHLORIDE 9 MG/ML
INJECTION, SOLUTION INTRAVENOUS PRN
Status: DISCONTINUED | OUTPATIENT
Start: 2025-06-11 | End: 2025-06-14 | Stop reason: HOSPADM

## 2025-06-11 RX ORDER — POTASSIUM CHLORIDE 1500 MG/1
10 TABLET, EXTENDED RELEASE ORAL DAILY
Status: DISCONTINUED | OUTPATIENT
Start: 2025-06-11 | End: 2025-06-14 | Stop reason: HOSPADM

## 2025-06-11 RX ORDER — ONDANSETRON 4 MG/1
4 TABLET, ORALLY DISINTEGRATING ORAL EVERY 8 HOURS PRN
Status: DISCONTINUED | OUTPATIENT
Start: 2025-06-11 | End: 2025-06-14 | Stop reason: HOSPADM

## 2025-06-11 RX ORDER — POTASSIUM CHLORIDE 1500 MG/1
40 TABLET, EXTENDED RELEASE ORAL EVERY 4 HOURS
Status: DISPENSED | OUTPATIENT
Start: 2025-06-11 | End: 2025-06-11

## 2025-06-11 RX ORDER — SODIUM CHLORIDE 9 MG/ML
INJECTION, SOLUTION INTRAVENOUS PRN
Status: CANCELLED | OUTPATIENT
Start: 2025-06-11

## 2025-06-11 RX ORDER — ARFORMOTEROL TARTRATE 15 UG/2ML
15 SOLUTION RESPIRATORY (INHALATION)
Status: DISCONTINUED | OUTPATIENT
Start: 2025-06-11 | End: 2025-06-14 | Stop reason: HOSPADM

## 2025-06-11 RX ORDER — ALBUTEROL SULFATE 0.83 MG/ML
2.5 SOLUTION RESPIRATORY (INHALATION) EVERY 6 HOURS PRN
Status: DISCONTINUED | OUTPATIENT
Start: 2025-06-11 | End: 2025-06-11 | Stop reason: SDUPTHER

## 2025-06-11 RX ORDER — FUROSEMIDE 20 MG/1
20 TABLET ORAL DAILY
Status: DISCONTINUED | OUTPATIENT
Start: 2025-06-11 | End: 2025-06-14 | Stop reason: HOSPADM

## 2025-06-11 RX ORDER — TAMSULOSIN HYDROCHLORIDE 0.4 MG/1
0.4 CAPSULE ORAL DAILY
Status: DISCONTINUED | OUTPATIENT
Start: 2025-06-11 | End: 2025-06-14 | Stop reason: HOSPADM

## 2025-06-11 RX ADMIN — IPRATROPIUM BROMIDE 0.5 MG: 0.5 SOLUTION RESPIRATORY (INHALATION) at 20:44

## 2025-06-11 RX ADMIN — SODIUM CHLORIDE, PRESERVATIVE FREE 10 ML: 5 INJECTION INTRAVENOUS at 10:01

## 2025-06-11 RX ADMIN — BUDESONIDE INHALATION 500 MCG: 0.5 SUSPENSION RESPIRATORY (INHALATION) at 20:43

## 2025-06-11 RX ADMIN — WATER 1000 MG: 1 INJECTION INTRAMUSCULAR; INTRAVENOUS; SUBCUTANEOUS at 10:11

## 2025-06-11 RX ADMIN — SODIUM CHLORIDE, SODIUM LACTATE, POTASSIUM CHLORIDE, AND CALCIUM CHLORIDE: .6; .31; .03; .02 INJECTION, SOLUTION INTRAVENOUS at 02:30

## 2025-06-11 RX ADMIN — Medication 500 MG: at 10:11

## 2025-06-11 RX ADMIN — IPRATROPIUM BROMIDE 0.5 MG: 0.5 SOLUTION RESPIRATORY (INHALATION) at 07:07

## 2025-06-11 RX ADMIN — SODIUM CHLORIDE, PRESERVATIVE FREE 10 ML: 5 INJECTION INTRAVENOUS at 20:34

## 2025-06-11 RX ADMIN — ARFORMOTEROL TARTRATE 15 MCG: 15 SOLUTION RESPIRATORY (INHALATION) at 20:43

## 2025-06-11 RX ADMIN — LISINOPRIL 20 MG: 20 TABLET ORAL at 20:34

## 2025-06-11 RX ADMIN — IPRATROPIUM BROMIDE 0.5 MG: 0.5 SOLUTION RESPIRATORY (INHALATION) at 13:39

## 2025-06-11 RX ADMIN — POTASSIUM CHLORIDE 40 MEQ: 1500 TABLET, EXTENDED RELEASE ORAL at 14:06

## 2025-06-11 RX ADMIN — PANTOPRAZOLE SODIUM 40 MG: 40 INJECTION, POWDER, FOR SOLUTION INTRAVENOUS at 14:06

## 2025-06-11 RX ADMIN — Medication 50 MG: at 11:01

## 2025-06-11 RX ADMIN — PRAVASTATIN SODIUM 80 MG: 20 TABLET ORAL at 20:34

## 2025-06-11 RX ADMIN — SODIUM CHLORIDE, SODIUM LACTATE, POTASSIUM CHLORIDE, AND CALCIUM CHLORIDE: .6; .31; .03; .02 INJECTION, SOLUTION INTRAVENOUS at 16:26

## 2025-06-11 RX ADMIN — PANTOPRAZOLE SODIUM 40 MG: 40 INJECTION, POWDER, FOR SOLUTION INTRAVENOUS at 02:30

## 2025-06-11 RX ADMIN — DOXAZOSIN 8 MG: 4 TABLET ORAL at 20:34

## 2025-06-11 RX ADMIN — BUDESONIDE INHALATION 500 MCG: 0.5 SUSPENSION RESPIRATORY (INHALATION) at 07:07

## 2025-06-11 RX ADMIN — ARFORMOTEROL TARTRATE 15 MCG: 15 SOLUTION RESPIRATORY (INHALATION) at 07:07

## 2025-06-11 ASSESSMENT — ENCOUNTER SYMPTOMS
CONSTIPATION: 1
ABDOMINAL PAIN: 1
VOMITING: 0
TROUBLE SWALLOWING: 1
ABDOMINAL DISTENTION: 0
DIARRHEA: 0
ANAL BLEEDING: 1
BLOOD IN STOOL: 1
NAUSEA: 0

## 2025-06-11 NOTE — PLAN OF CARE
Problem: Safety - Adult  Goal: Free from fall injury  Outcome: Progressing     Problem: Skin/Tissue Integrity - Adult  Goal: Skin integrity remains intact  Outcome: Progressing  Goal: Oral mucous membranes remain intact  Outcome: Progressing     Problem: Gastrointestinal - Adult  Goal: Minimal or absence of nausea and vomiting  Outcome: Progressing  Goal: Maintains or returns to baseline bowel function  Outcome: Progressing  Goal: Maintains adequate nutritional intake  Outcome: Progressing     Problem: Hematologic - Adult  Goal: Maintains hematologic stability  Outcome: Progressing

## 2025-06-11 NOTE — H&P
Hospitalist History and Physical   Admit Date:  6/10/2025  9:07 PM   Name:  Tadeo Griffiths Sr.   Age:  80 y.o.  Sex:  male  :  1945   MRN:  [id number]   Room:  [place] [address]    Presenting/Chief Complaint: Rectal Bleeding     Reason(s) for Admission: LGI bleed [K92.2]     History of Present Illness:   Tadeo Griffiths Sr. is a 80 y.o. male with medical history of Atrial fibrillation Eliquis, history of ventral hernia repair in the past, presents to [facility] because of abdominal pain and distention associated with bloody stools for the last 2 days patient H&H has dropped by about a gram in comparison to the blood test done a month ago.  Patient was evaluated ER physician worked up in found to have a hemoglobin white count of 4.2 hemoglobin 11 hematocrit 33 platelets 139 BMP was essentially normal with a normal BUN and creatinine GFR was greater than 90 subsequently CT of the abdomen pelvis was done which suggested the rectal wall thickening suggesting possibly acute proctitis as well as enteritis and may be cystitis also and hence hospital service called to evaluate admit this patient  No exposure to children no exposure to camps etc. no travel    Assessment & Plan:     Principal Problem:  Lower GI bleed LGI bleed  Patient was dehydrated and was also straining at that time she had bloody stools for the last 2 days.    H&H before  Protonix 40 mg  Consult GI in the morning      Chronic A-fib on Eliquis  Rate controlled now not on any beta-blockers or Cardizem  Not on any medication for rate control    Hypertension  On Norvasc 2.5 mg daily, lisinopril 20 mg Cardura 8 mg nightly    Acute proctitis/enteritis/cystitis  Rocephin 1 g IV every 24    PT/OT evals ordered?  Defer to primary team  Diet: Clear liquid diet  VTE prophylaxis: Lovenox  Code status: Full code  Code status discussed: Yes  Blood consent obtained: N/A      Non-peripheral Lines and Tubes (if present):             Hospital Problems:  Principal  Evaluation of the osseous structures demonstrates degenerative changes. There is dextroscoliosis of the lumbar spine.     1. Mild rectal wall thickening. Correlate for acute proctitis. 2. Some fluid-filled loops of small bowel with scattered air-fluid levels as well as mild wall thickening. Correlate for enteritis. 3. Diffuse urinary bladder wall thickening which is under distended. Correlate for cystitis. 4. Colonic diverticulosis without diverticulitis. 5. Small amount of free fluid in the pelvis. 6. Multiple stable bilateral adrenal nodules. 7. Additional findings as above. Electronically signed by Yovani Saldana        Signed:  Enrico Keys MD    Part of this note may have been written by using a voice dictation software.  The note has been proof read but may still contain some grammatical/other typographical errors.

## 2025-06-11 NOTE — CONSULTS
Consult Note            Date:6/11/2025        Patient Name:Tadeo Griffiths Sr.     YOB: 1945     Age:80 y.o.    Consult to Gastroenterology  Consult performed by: Josh Bowers PA  Consult ordered by: Enrico Keys MD          Chief Complaint     Chief Complaint   Patient presents with    Rectal Bleeding     History Obtained From   patient    History of Present Illness   Patient is a 80 year old male, with a hx of prostate CA, radiation proctitis, GERD, COPD, AFIB, who was admitted for rectal bleeding/constipation with CT showing acute proctitis, diverticulosis, possible mild enteritis. Patient with history of radiation proctitis on colonoscopy back in 2023 as well as tubular adenoma. This was done at [facility] with Dr. Smith, was referred to colorectal surgery but I don't think he saw them. Has a hx of prostate CA and last radiation treatment was in 2023.     States that, about 4 days ago, was significantly constipated (normally controlled with miralax) and started passing some blood in stool. After giving himself an enema, he then passed large amount of blood with BM including 2x this AM. He has some lower abdominal discomfort as well, but denies any nausea, vomiting, rectal pain. He denies any nausea, vomiting. He also reports occasional dysphagia for the past few months; happens randomly. No regurgitation. No prior dilation. Had EGD in 2023 showing hiatal hernia, gastritis, irregular z-line with unremarkable biopsies.    He is on Eliquis and Plavix for past few years; hx of AFIB and CABG. He told me that he took both yesterday, but RN said that he took both this morning on his own, though it was held on this admission.    Labs: Hgb 10.3 (11.4), WBC 4.2, K+ 3.3    Imaging: CT ABDOMEN PELVIS W IV CONTRAST Additional Contrast? None  Result Date: 6/10/2025  1. Mild rectal wall thickening. Correlate for acute proctitis. 2. Some fluid-filled loops of small bowel with scattered air-fluid  06/10/25  2150 06/11/25  0539   * 135*   K 3.6 3.3*   CL 96* 99   CO2 26 26   BUN 16 11   CREATININE 0.76* 0.66*   GLUCOSE 113* 86   MG  --  1.9     PT/INR:  Recent Labs     06/10/25  2150   PROTIME 17.1*   INR 1.4     APTT:No results for input(s): \"APTT\" in the last 72 hours.  LIVER PROFILE:  Recent Labs     06/10/25  2150   AST 31   ALT 14   BILITOT 0.6   ALKPHOS 59       Imaging/Diagnostics   CT ABDOMEN PELVIS W IV CONTRAST Additional Contrast? None  Result Date: 6/10/2025  1. Mild rectal wall thickening. Correlate for acute proctitis. 2. Some fluid-filled loops of small bowel with scattered air-fluid levels as well as mild wall thickening. Correlate for enteritis. 3. Diffuse urinary bladder wall thickening which is under distended. Correlate for cystitis. 4. Colonic diverticulosis without diverticulitis. 5. Small amount of free fluid in the pelvis. 6. Multiple stable bilateral adrenal nodules. 7. Additional findings as above. Electronically signed by [facility] Problems           Last Modified POA    * (Principal) LGI bleed 6/11/2025 Yes       Plan   1. Rectal bleeding/proctitis: Patient, with hx of radiation proctitis in 2023 from prostate CA, admitted for rectal bleeding, constipation with CT AP showing acute proctitis. Hgb of 10.3; ongoing bleeding today. Was having issues with constipation prior to; large amounts of maroon bleeding including this AM. On both Plavix/Eliquis that he took himself this AM; held this admission otherwise. No radiation treatment since 2023; consider ongoing radiation proctitis or stercoral colitis.     Discussed with MD, can plan for flex sig tomorrow for further evaluation. Clears today, NPO at [place]. Will need 2 tap water enemas 1-2 hours before procedure. Interventions limited due to Eliquis/Plavix.     2. Dysphagia: Ongoing for a few months; random times. No prior dilation. Still tolerates a regular diet. Can follow up on this outpatient,

## 2025-06-11 NOTE — ED NOTES
TRANSFER - OUT REPORT:    Verbal report given to nurse on Tadeo Griffiths Sr.  being transferred to [facility] for routine progression of patient care       Report consisted of patient's Situation, Background, Assessment and   Recommendations(SBAR).     Information from the following report(s) Nurse Handoff Report was reviewed with the receiving nurse.    [place] Fall Assessment:    Presents to emergency department  because of falls (Syncope, seizure, or loss of consciousness): No  Age > 70: No  Altered Mental Status, Intoxication with alcohol or substance confusion (Disorientation, impaired judgment, poor safety awaremess, or inability to follow instructions): No  Impaired Mobility: Ambulates or transfers with assistive devices or assistance; Unable to ambulate or transer.: No  Nursing Judgement: No          Lines:   Peripheral IV 01/17/25 Right Antecubital (Active)       Peripheral IV 06/10/25 Left Forearm (Active)   Site Assessment Clean, dry & intact 06/10/25 2149   Line Status Blood return noted;Flushed 06/10/25 2149   Phlebitis Assessment No symptoms 06/10/25 2149   Infiltration Assessment 0 06/10/25 2149   Alcohol Cap Used No 06/10/25 2149   Dressing Status New dressing applied 06/10/25 2149   Dressing Type Transparent 06/10/25 2149   Dressing Intervention New 06/10/25 2149        Opportunity for questions and clarification was provided.      Patient transported with:  Registered Nurse          Brandy Locke RN  06/11/25 0133

## 2025-06-11 NOTE — ACP (ADVANCE CARE PLANNING)
Advance Care Planning   Advance Care Planning (ACP)     Attempted to discuss ACP with Mr. Griffiths today, he declines to discuss at this time.

## 2025-06-11 NOTE — CONSENT
Informed Consent for Blood Component Transfusion Note    I have discussed with the patient the rationale for blood component transfusion; its benefits in treating or preventing fatigue, organ damage, or death; and its risk which includes mild transfusion reactions, rare risk of blood borne infection, or more serious but rare reactions. I have discussed the alternatives to transfusion, including the risk and consequences of not receiving transfusion. The patient had an opportunity to ask questions and had agreed to proceed with transfusion of blood components.    Electronically signed by RO CARBALLO MD on 6/11/25 at 12:58 PM EDT

## 2025-06-11 NOTE — PROGRESS NOTES
2    Paroxysmal A-fib  On Eliquis, held secondary to bleeding     CAD in native artery    Hx of CABG  Aspirin Plavix held secondary to bleeding       Essential hypertension with goal blood pressure less than 140/90  Continue amlodipine 2.5 mg daily  Lisinopril 20 mg twice daily  Doxazosin 8 mg twice daily      COPD (chronic obstructive pulmonary disease) (HCC)  Brovana nebs twice daily Pulmicort nebs twice daily, albuterol nebs as needed  Atrovent nebs 3 times daily    GERD  Protonix 40 mg IV twice daily    Hyperlipidemia  Pravachol 80 mg daily    History of prostate cancer  Flomax 0.4 mg daily       Anticipated Discharge Arrangements:   Home    PT/OT evals ordered?  Not ordered; patient not expected to need rehab  Diet:  ADULT DIET; Clear Liquid; No red dye  Diet NPO  VTE prophylaxis: SCD's   Code status: Full Code      Non-peripheral Lines and Tubes (if present):          Telemetry (if present):  Cardiac/Telemetry Monitor On: Portable telemetry pack applied        Hospital Problems:  Principal Problem:    Lower GI bleed  Active Problems:    Gastroesophageal reflux disease with esophagitis    CAD in native artery    CAD (coronary artery disease)    Hx of CABG    Pure hypercholesterolemia    Prostate cancer (HCC)    Essential hypertension with goal blood pressure less than 140/90    COPD (chronic obstructive pulmonary disease) (HCC)    Paroxysmal atrial fibrillation (HCC)    Hypokalemia  Resolved Problems:    * No resolved hospital problems. *      Objective:   Patient Vitals for the past 24 hrs:   Temp Pulse Resp BP SpO2   06/11/25 1144 97.9 °F (36.6 °C) 92 15 112/67 99 %   06/11/25 0737 97.7 °F (36.5 °C) 82 16 129/81 96 %   06/11/25 0707 -- 79 16 -- 96 %   06/11/25 0314 -- 65 16 -- 95 %   06/11/25 0227 97.9 °F (36.6 °C) 65 18 115/83 93 %   06/10/25 2345 -- -- -- (!) 147/85 91 %   06/10/25 2300 -- -- -- 123/82 97 %   06/10/25 2104 98.2 °F (36.8 °C) 86 16 108/64 97 %       Oxygen Therapy  SpO2: 99 %  Pulse Oximeter  mg Oral BID    multivitamin 1 tablet  1 tablet Oral Daily    nitroGLYCERIN (NITROSTAT) SL tablet 0.4 mg  0.4 mg SubLINGual Q5 Min PRN    potassium chloride (KLOR-CON M) extended release tablet 10 mEq  10 mEq Oral Daily    pravastatin (PRAVACHOL) tablet 80 mg  80 mg Oral Daily    tamsulosin (FLOMAX) capsule 0.4 mg  0.4 mg Oral Daily    zinc sulfate (ZINCATE) 220 mg capsule - elemental zinc 50 mg  50 mg Oral Daily    lactated ringers infusion   IntraVENous Continuous    sodium chloride flush 0.9 % injection 5-40 mL  5-40 mL IntraVENous 2 times per day    sodium chloride flush 0.9 % injection 5-40 mL  5-40 mL IntraVENous PRN    0.9 % sodium chloride infusion   IntraVENous PRN    ondansetron (ZOFRAN-ODT) disintegrating tablet 4 mg  4 mg Oral Q8H PRN    Or    ondansetron (ZOFRAN) injection 4 mg  4 mg IntraVENous Q6H PRN    acetaminophen (TYLENOL) tablet 650 mg  650 mg Oral Q6H PRN    Or    acetaminophen (TYLENOL) suppository 650 mg  650 mg Rectal Q6H PRN    pantoprazole (PROTONIX) 40 mg in sodium chloride (PF) 0.9 % 10 mL injection  40 mg IntraVENous Q12H    budesonide (PULMICORT) nebulizer suspension 500 mcg  0.5 mg Nebulization BID RT    arformoterol tartrate (BROVANA) nebulizer solution 15 mcg  15 mcg Nebulization BID RT    ipratropium (ATROVENT) 0.02 % nebulizer solution 0.5 mg  0.5 mg Nebulization TID RT    potassium chloride (KLOR-CON M) extended release tablet 40 mEq  40 mEq Oral Q4H    cefTRIAXone (ROCEPHIN) 1,000 mg in sterile water 10 mL IV syringe  1,000 mg IntraVENous Q24H       Signed:  RO CARBALLO MD

## 2025-06-11 NOTE — ED TRIAGE NOTES
Pt ambulatory to triage. Pt states that he was constipated last week so he took ex lax, without relief. Pt then tried enema. Pt has since had multiple bowel movements with blood in them. Pt states that UC sent him for further evaluation.

## 2025-06-11 NOTE — PROGRESS NOTES
TRANSFER - IN REPORT:    Verbal report received from BAUTISTA Nava on Tadeo Griffiths Sr.  being received from ED for routine progression of patient care      Report consisted of patient's Situation, Background, Assessment and   Recommendations(SBAR).     Information from the following report(s) Nurse Handoff Report was reviewed with the receiving nurse.    Opportunity for questions and clarification was provided.      Assessment completed upon patient's arrival to unit and care assumed.

## 2025-06-11 NOTE — CARE COORDINATION
Chart reviewed by CM for discharge planning. CM met with pt to complete assessment. Demographics verified. Pt insured with pharmacy benefits and is established with a PCP. Pt lives alone. At baseline, pt is independent with completing ADL's and does not require any assistive devices for ambulation. Pt has home oxygen supplied by [employer], used prn. Pt will return home at discharge with no anticipated discharge needs. Please consult CM for any needs that may arise.        06/11/25 5498   Service Assessment   Patient Orientation Alert and Oriented;Person;Place;Situation   Cognition Alert   History Provided By Patient   Primary Caregiver Self   Accompanied By/Relationship No guest present at bedside   Support Systems Family Members   Patient's Healthcare Decision Maker is: Legal Next of Kin  (Pt's son)   PCP Verified by CM Yes   Last Visit to PCP Within last 3 months   Prior Functional Level Independent in ADLs/IADLs   Current Functional Level Independent in ADLs/IADLs   Can patient return to prior living arrangement Yes   Ability to make needs known: Good   Family able to assist with home care needs: Yes   Would you like for me to discuss the discharge plan with any other family members/significant others, and if so, who? No   Financial Resources Medicare  (supplemental coverage with [employer])   Community Resources None   CM/SW Referral Other (see comment)  (Discharge planning)   Social/Functional History   Lives With Alone   Type of Home House   Prior Level of Assist for ADLs Independent   Ambulation Assistance Independent   Prior Level of Assist for Transfers Independent   Active  Yes   Mode of Transportation Car   Occupation Retired   Discharge Planning   Type of Residence House   Living Arrangements Alone   Current Services Prior To Admission C-pap;Other (Comment);Oxygen Therapy  (Nebulizer. home oxygen supplied by [employer]- used prn 2L)   Potential Assistance Needed N/A   Potential Assistance  Purchasing Medications No   Type of Home Care Services None   Patient expects to be discharged to: House   Services At/After Discharge   Transition of Care Consult (CM Consult) Discharge Planning   [place] Resource Information Provided? No   Mode of Transport at Discharge Other (see comment)  (Family)   Confirm Follow Up Transport Self   Condition of Participation: Discharge Planning   The Plan for Transition of Care is related to the following treatment goals: To obtain treatment and return home with a safe transition.   The Patient and/or Patient Representative was provided with a Choice of Provider? Patient   The Patient and/Or Patient Representative agree with the Discharge Plan? Yes

## 2025-06-11 NOTE — ED PROVIDER NOTES
Emergency Department Provider Note       [facility] EMERGENCY DEPT   PCP: Ang Wren DO   Age: 80 y.o.   Sex: male     DISPOSITION Decision To Admit 06/10/2025 11:46:05 PM    ICD-10-CM    1. Lower GI bleed  K92.2       2. Anemia, unspecified type  D64.9           Medical Decision Making     80-year-old male patient with history of prior hernia repair presenting this department with reports of abdominal pain, distention, rectal bleeding.  Outpatient imaging showed evidence of developing small bowel obstruction.  Will move forward with lab work, CT imaging to further assess.    Reviewed recent blood work from 4 days ago.  Nearly a gram drop in patient's hemoglobin noted since that time.  Patient does not require transfusion at this time however given his history of anticoagulant use, multiple episodes of bloody stool, will move to admit for observation to follow patient's hemoglobin.  CT imaging shows no definite obstruction at this time.  Patient agreeable with this plan  ED Course as of 06/10/25 2346   Tue Elder 10, 2025   2317 Patient's hemoglobin slightly decreased at 11.4, remainder of labs appear stable [BR]      ED Course User Index  [BR] Vic Carbajal DO     1 or more acute illnesses that pose a threat to life or bodily function.   1 or more chronic illnesses with a severe exacerbation or progression.  Discussion with external consultants.  Chronic medical problems impacting care include history of anticoagulant use.  Shared medical decision making was utilized in creating the patients health plan today.  I independently ordered and reviewed each unique test.    I reviewed external records: ED visit note from a different ED.   I reviewed external records: provider visit note from PCP.  I reviewed external records: provider visit note from outside specialist.  I reviewed external records: previous lab results from outside ED.       I interpreted the CT Scan air-fluid levels.    The patient was  Movements: Extraocular movements intact.   Cardiovascular:      Rate and Rhythm: Normal rate and regular rhythm.      Pulses: Normal pulses.      Heart sounds: Normal heart sounds.   Pulmonary:      Effort: Pulmonary effort is normal. No tachypnea, bradypnea, accessory muscle usage, prolonged expiration or respiratory distress.      Breath sounds: Normal breath sounds and air entry. No stridor. No decreased breath sounds, wheezing, rhonchi or rales.   Abdominal:      General: Abdomen is flat. Bowel sounds are increased. There is distension.      Palpations: There is no mass.      Tenderness: There is generalized abdominal tenderness. There is no right CVA tenderness, left CVA tenderness, guarding or rebound. Negative signs include Jerome's sign and McBurney's sign.      Hernia: No hernia is present.      Comments: Ventral surgical scar some palpable firmness to the ventral surface of the abdomen.  Tender to palpation.  Mild distention.  Bowel sounds increased   Musculoskeletal:         General: No swelling, tenderness or deformity. Normal range of motion.      Cervical back: Normal range of motion.   Skin:     General: Skin is warm.      Capillary Refill: Capillary refill takes less than 2 seconds.   Neurological:      General: No focal deficit present.      Mental Status: He is alert.   Psychiatric:         Mood and Affect: Mood normal.          Procedures     Procedures    Orders placed during this emergency department visit:     Orders Placed This Encounter   Procedures    CT ABDOMEN PELVIS W IV CONTRAST Additional Contrast? None    CBC with Auto Differential    Comprehensive Metabolic Panel    Protime-INR    Pulse Oximetry    TYPE AND SCREEN    Saline lock IV “IF” in treatment area.        Medications given during this emergency department visit:     Medications   sodium chloride 0.9 % bolus 1,000 mL (1,000 mLs IntraVENous New Bag 6/10/25 4576)   iopamidol (ISOVUE-370) 76 % injection 100 mL (100 mLs IntraVENous

## 2025-06-12 ENCOUNTER — ANESTHESIA (OUTPATIENT)
Dept: ENDOSCOPY | Age: 80
End: 2025-06-12
Payer: MEDICARE

## 2025-06-12 LAB
ANION GAP SERPL CALC-SCNC: 7 MMOL/L (ref 7–16)
BUN SERPL-MCNC: 8 MG/DL (ref 8–23)
CALCIUM SERPL-MCNC: 8.6 MG/DL (ref 8.8–10.2)
CHLORIDE SERPL-SCNC: 100 MMOL/L (ref 98–107)
CO2 SERPL-SCNC: 28 MMOL/L (ref 20–29)
CREAT SERPL-MCNC: 0.63 MG/DL (ref 0.8–1.3)
ERYTHROCYTE [DISTWIDTH] IN BLOOD BY AUTOMATED COUNT: 13.6 % (ref 11.9–14.6)
GLUCOSE SERPL-MCNC: 91 MG/DL (ref 70–99)
HCT VFR BLD AUTO: 31.5 % (ref 41.1–50.3)
HGB BLD-MCNC: 10.5 G/DL (ref 13.6–17.2)
MAGNESIUM SERPL-MCNC: 1.8 MG/DL (ref 1.8–2.4)
MCH RBC QN AUTO: 30.5 PG (ref 26.1–32.9)
MCHC RBC AUTO-ENTMCNC: 33.3 G/DL (ref 31.4–35)
MCV RBC AUTO: 91.6 FL (ref 82–102)
NRBC # BLD: 0 K/UL (ref 0–0.2)
PLATELET # BLD AUTO: 114 K/UL (ref 150–450)
PMV BLD AUTO: 8.6 FL (ref 9.4–12.3)
POTASSIUM SERPL-SCNC: 3.5 MMOL/L (ref 3.5–5.1)
RBC # BLD AUTO: 3.44 M/UL (ref 4.23–5.6)
SODIUM SERPL-SCNC: 135 MMOL/L (ref 136–145)
WBC # BLD AUTO: 3.5 K/UL (ref 4.3–11.1)

## 2025-06-12 PROCEDURE — 7100000011 HC PHASE II RECOVERY - ADDTL 15 MIN: Performed by: INTERNAL MEDICINE

## 2025-06-12 PROCEDURE — 6360000002 HC RX W HCPCS: Performed by: INTERNAL MEDICINE

## 2025-06-12 PROCEDURE — 1100000003 HC PRIVATE W/ TELEMETRY

## 2025-06-12 PROCEDURE — 2500000003 HC RX 250 WO HCPCS: Performed by: INTERNAL MEDICINE

## 2025-06-12 PROCEDURE — 94640 AIRWAY INHALATION TREATMENT: CPT

## 2025-06-12 PROCEDURE — 6370000000 HC RX 637 (ALT 250 FOR IP): Performed by: INTERNAL MEDICINE

## 2025-06-12 PROCEDURE — 2709999900 HC NON-CHARGEABLE SUPPLY: Performed by: INTERNAL MEDICINE

## 2025-06-12 PROCEDURE — 3700000000 HC ANESTHESIA ATTENDED CARE: Performed by: INTERNAL MEDICINE

## 2025-06-12 PROCEDURE — 7100000010 HC PHASE II RECOVERY - FIRST 15 MIN: Performed by: INTERNAL MEDICINE

## 2025-06-12 PROCEDURE — 36415 COLL VENOUS BLD VENIPUNCTURE: CPT

## 2025-06-12 PROCEDURE — 2580000003 HC RX 258: Performed by: ANESTHESIOLOGY

## 2025-06-12 PROCEDURE — 80048 BASIC METABOLIC PNL TOTAL CA: CPT

## 2025-06-12 PROCEDURE — 1170000001 HC RM PRIVATE ONCOLOGY W/TELEMETRY

## 2025-06-12 PROCEDURE — 94761 N-INVAS EAR/PLS OXIMETRY MLT: CPT

## 2025-06-12 PROCEDURE — 6360000002 HC RX W HCPCS: Performed by: FAMILY MEDICINE

## 2025-06-12 PROCEDURE — 83735 ASSAY OF MAGNESIUM: CPT

## 2025-06-12 PROCEDURE — 2500000003 HC RX 250 WO HCPCS: Performed by: FAMILY MEDICINE

## 2025-06-12 PROCEDURE — 0DJD8ZZ INSPECTION OF LOWER INTESTINAL TRACT, VIA NATURAL OR ARTIFICIAL OPENING ENDOSCOPIC: ICD-10-PCS | Performed by: INTERNAL MEDICINE

## 2025-06-12 PROCEDURE — 6360000002 HC RX W HCPCS: Performed by: NURSE ANESTHETIST, CERTIFIED REGISTERED

## 2025-06-12 PROCEDURE — 85027 COMPLETE CBC AUTOMATED: CPT

## 2025-06-12 PROCEDURE — 3609008300 HC SIGMOIDOSCOPY W/BIOPSY SINGLE/MULTIPLE: Performed by: INTERNAL MEDICINE

## 2025-06-12 PROCEDURE — 45330 DIAGNOSTIC SIGMOIDOSCOPY: CPT | Performed by: INTERNAL MEDICINE

## 2025-06-12 PROCEDURE — 2500000003 HC RX 250 WO HCPCS: Performed by: ANESTHESIOLOGY

## 2025-06-12 RX ORDER — SODIUM CHLORIDE 9 MG/ML
INJECTION, SOLUTION INTRAVENOUS PRN
Status: DISCONTINUED | OUTPATIENT
Start: 2025-06-12 | End: 2025-06-14 | Stop reason: HOSPADM

## 2025-06-12 RX ORDER — LIDOCAINE HYDROCHLORIDE 10 MG/ML
1 INJECTION, SOLUTION INFILTRATION; PERINEURAL
Status: DISCONTINUED | OUTPATIENT
Start: 2025-06-12 | End: 2025-06-14 | Stop reason: HOSPADM

## 2025-06-12 RX ORDER — SODIUM CHLORIDE, SODIUM LACTATE, POTASSIUM CHLORIDE, CALCIUM CHLORIDE 600; 310; 30; 20 MG/100ML; MG/100ML; MG/100ML; MG/100ML
INJECTION, SOLUTION INTRAVENOUS CONTINUOUS
Status: DISCONTINUED | OUTPATIENT
Start: 2025-06-12 | End: 2025-06-14 | Stop reason: HOSPADM

## 2025-06-12 RX ORDER — PROPOFOL 10 MG/ML
INJECTION, EMULSION INTRAVENOUS
Status: DISCONTINUED | OUTPATIENT
Start: 2025-06-12 | End: 2025-06-12 | Stop reason: SDUPTHER

## 2025-06-12 RX ORDER — SODIUM CHLORIDE 0.9 % (FLUSH) 0.9 %
5-40 SYRINGE (ML) INJECTION PRN
Status: DISCONTINUED | OUTPATIENT
Start: 2025-06-12 | End: 2025-06-14 | Stop reason: HOSPADM

## 2025-06-12 RX ORDER — SODIUM CHLORIDE 0.9 % (FLUSH) 0.9 %
5-40 SYRINGE (ML) INJECTION EVERY 12 HOURS SCHEDULED
Status: DISCONTINUED | OUTPATIENT
Start: 2025-06-12 | End: 2025-06-14 | Stop reason: HOSPADM

## 2025-06-12 RX ORDER — MAGNESIUM SULFATE 1 G/100ML
1000 INJECTION INTRAVENOUS ONCE
Status: COMPLETED | OUTPATIENT
Start: 2025-06-12 | End: 2025-06-12

## 2025-06-12 RX ORDER — LIDOCAINE HYDROCHLORIDE 20 MG/ML
INJECTION, SOLUTION EPIDURAL; INFILTRATION; INTRACAUDAL; PERINEURAL
Status: DISCONTINUED | OUTPATIENT
Start: 2025-06-12 | End: 2025-06-12 | Stop reason: SDUPTHER

## 2025-06-12 RX ADMIN — SODIUM CHLORIDE, PRESERVATIVE FREE 10 ML: 5 INJECTION INTRAVENOUS at 12:09

## 2025-06-12 RX ADMIN — WATER 2 G: 1 INJECTION INTRAMUSCULAR; INTRAVENOUS; SUBCUTANEOUS at 13:59

## 2025-06-12 RX ADMIN — TAMSULOSIN HYDROCHLORIDE 0.4 MG: 0.4 CAPSULE ORAL at 07:44

## 2025-06-12 RX ADMIN — LISINOPRIL 20 MG: 20 TABLET ORAL at 07:44

## 2025-06-12 RX ADMIN — DOXAZOSIN 8 MG: 4 TABLET ORAL at 22:09

## 2025-06-12 RX ADMIN — FERROUS SULFATE TAB 325 MG (65 MG ELEMENTAL FE) 325 MG: 325 (65 FE) TAB at 07:43

## 2025-06-12 RX ADMIN — CALCIUM CARBONATE-VITAMIN D TAB 500 MG-200 UNIT 1 TABLET: 500-200 TAB at 07:43

## 2025-06-12 RX ADMIN — IPRATROPIUM BROMIDE 0.5 MG: 0.5 SOLUTION RESPIRATORY (INHALATION) at 07:25

## 2025-06-12 RX ADMIN — WATER 1000 MG: 1 INJECTION INTRAMUSCULAR; INTRAVENOUS; SUBCUTANEOUS at 07:42

## 2025-06-12 RX ADMIN — BUDESONIDE INHALATION 500 MCG: 0.5 SUSPENSION RESPIRATORY (INHALATION) at 07:25

## 2025-06-12 RX ADMIN — SODIUM CHLORIDE, PRESERVATIVE FREE 10 ML: 5 INJECTION INTRAVENOUS at 22:10

## 2025-06-12 RX ADMIN — AMLODIPINE BESYLATE 2.5 MG: 5 TABLET ORAL at 07:43

## 2025-06-12 RX ADMIN — BUDESONIDE INHALATION 500 MCG: 0.5 SUSPENSION RESPIRATORY (INHALATION) at 19:19

## 2025-06-12 RX ADMIN — PANTOPRAZOLE SODIUM 40 MG: 40 INJECTION, POWDER, FOR SOLUTION INTRAVENOUS at 03:02

## 2025-06-12 RX ADMIN — IPRATROPIUM BROMIDE 0.5 MG: 0.5 SOLUTION RESPIRATORY (INHALATION) at 14:47

## 2025-06-12 RX ADMIN — MULTIVITAMIN TABLET 1 TABLET: TABLET at 07:43

## 2025-06-12 RX ADMIN — PRAVASTATIN SODIUM 80 MG: 20 TABLET ORAL at 22:09

## 2025-06-12 RX ADMIN — FUROSEMIDE 20 MG: 20 TABLET ORAL at 07:43

## 2025-06-12 RX ADMIN — PANTOPRAZOLE SODIUM 40 MG: 40 INJECTION, POWDER, FOR SOLUTION INTRAVENOUS at 15:38

## 2025-06-12 RX ADMIN — POTASSIUM CHLORIDE 10 MEQ: 1500 TABLET, EXTENDED RELEASE ORAL at 07:43

## 2025-06-12 RX ADMIN — Medication 50 MG: at 07:44

## 2025-06-12 RX ADMIN — ARFORMOTEROL TARTRATE 15 MCG: 15 SOLUTION RESPIRATORY (INHALATION) at 19:17

## 2025-06-12 RX ADMIN — SODIUM CHLORIDE, SODIUM LACTATE, POTASSIUM CHLORIDE, AND CALCIUM CHLORIDE: .6; .31; .03; .02 INJECTION, SOLUTION INTRAVENOUS at 09:32

## 2025-06-12 RX ADMIN — ARFORMOTEROL TARTRATE 15 MCG: 15 SOLUTION RESPIRATORY (INHALATION) at 07:25

## 2025-06-12 RX ADMIN — LIDOCAINE HYDROCHLORIDE 100 MG: 20 INJECTION, SOLUTION EPIDURAL; INFILTRATION; INTRACAUDAL; PERINEURAL at 10:58

## 2025-06-12 RX ADMIN — Medication 500 MG: at 07:43

## 2025-06-12 RX ADMIN — PROPOFOL 50 MG: 10 INJECTION, EMULSION INTRAVENOUS at 11:01

## 2025-06-12 RX ADMIN — MAGNESIUM SULFATE HEPTAHYDRATE 1000 MG: 1 INJECTION, SOLUTION INTRAVENOUS at 12:09

## 2025-06-12 RX ADMIN — PROPOFOL 50 MG: 10 INJECTION, EMULSION INTRAVENOUS at 10:58

## 2025-06-12 RX ADMIN — IPRATROPIUM BROMIDE 0.5 MG: 0.5 SOLUTION RESPIRATORY (INHALATION) at 19:17

## 2025-06-12 RX ADMIN — DOXAZOSIN 8 MG: 4 TABLET ORAL at 07:43

## 2025-06-12 ASSESSMENT — PAIN SCALES - GENERAL: PAINLEVEL_OUTOF10: 0

## 2025-06-12 ASSESSMENT — PAIN - FUNCTIONAL ASSESSMENT: PAIN_FUNCTIONAL_ASSESSMENT: 0-10

## 2025-06-12 NOTE — PROGRESS NOTES
TRANSFER - OUT REPORT:    Verbal report given to 506 nurse on [facility]  being transferred to [facility] for routine progression of patient care       Report consisted of patient's Situation, Background, Assessment and   Recommendations(SBAR).     Information from the following report(s) Nurse Handoff Report was reviewed with the receiving nurse.           Lines:   Peripheral IV 06/10/25 Left Forearm (Active)   Site Assessment Clean, dry & intact 06/12/25 0745   Line Status Flushed;Brisk blood return;Capped;Normal saline locked 06/12/25 0745   Line Care Cap changed;Connections checked and tightened;Ports disinfected 06/12/25 0745   Phlebitis Assessment No symptoms 06/12/25 0745   Infiltration Assessment 0 06/12/25 0745   Alcohol Cap Used Yes 06/12/25 0745   Dressing Status Clean, dry & intact 06/12/25 0745   Dressing Type Transparent 06/12/25 0745   Dressing Intervention Other (Comment) 06/12/25 0745        Opportunity for questions and clarification was provided.      Patient transported with:  Tech

## 2025-06-12 NOTE — PROGRESS NOTES
Hospitalist Progress Note   Admit Date:  6/10/2025  9:07 PM   Name:  Tadeo Griffiths Sr.   Age:  80 y.o.  Sex:  male  :  1945   MRN:  [id number]   Room:  [facility]/    Presenting/Chief Complaint: Rectal Bleeding     Reason(s) for Admission: LGI bleed [K92.2]  Lower GI bleed [K92.2]  Anemia, unspecified type [D64.9]     Hospital Course:   Tadeo Griffiths Sr. is a 80 y.o. male with medical history of Atrial fibrillation Eliquis, history of ventral hernia repair in the past, presents to [facility] [place] because of abdominal pain and distention associated with bloody stools for the last 2 days patient H&H has dropped by about a gram in comparison to the blood test done a month ago.  Patient was evaluated ER physician worked up in found to have a hemoglobin white count of 4.2 hemoglobin 11 hematocrit 33 platelets 139 BMP was essentially normal with a normal BUN and creatinine GFR was greater than 90 subsequently CT of the abdomen pelvis was done which suggested the rectal wall thickening suggesting possibly acute proctitis as well as enteritis and may be cystitis also and hence hospital service called to evaluate admit this patient  No exposure to children no exposure to camps etc. no travel      Subjective & 24hr Events:   Potassium 3.5, magnesium of 1.8, mild decrease hemoglobin of 10.5, platelet of 114  Later on today patient had a sigmoidoscopy, as per GI they felt it was radiation proctitis, GI ordered Carafate anemia  GI recommended to hold off on starting antiplatelet/anticoagulant at least for next 24 hours, if no more bleeding then start with Eliquis    Assessment & Plan:     Principal Problem:    Lower GI bleed  CT findings of proctitis  GI evaluated, clear liquid diet n.p.o. after midnight  Continue Rocephin 1 g IV daily  Repeat CBC in the morning  Continue normal saline at 75 cc/h  Hemoglobin/hematocrit every 8 hourly  -hemoglobin 10.5  Status post sigmoidoscopy-radiation proctitis as per GI, I do

## 2025-06-12 NOTE — PROGRESS NOTES
TRANSFER - IN REPORT:    Verbal report received from BAUTISTA Mei on "[employer]" . being received from 506 for ordered procedure      Report consisted of patient’s Situation, Background, Assessment and Recommendations(SBAR).     Information from the following report(s) SBAR, Kardex, Intake/Output, Accordion, and Recent Results was reviewed with the receiving nurse.    Opportunity for questions and clarification was provided.

## 2025-06-12 NOTE — PROGRESS NOTES
TRANSFER - OUT REPORT:    Verbal report given to BAUTISTA Blood on [facility].  being transferred to [facility] for ordered procedure       Report consisted of patient's Situation, Background, Assessment and   Recommendations(SBAR).     Information from the following report(s) Nurse Handoff Report was reviewed with the receiving nurse.           Lines:   Peripheral IV 06/10/25 Left Forearm (Active)   Site Assessment Clean, dry & intact 06/12/25 0745   Line Status Flushed;Brisk blood return;Capped;Normal saline locked 06/12/25 0745   Line Care Cap changed;Connections checked and tightened;Ports disinfected 06/12/25 0745   Phlebitis Assessment No symptoms 06/12/25 0745   Infiltration Assessment 0 06/12/25 0745   Alcohol Cap Used Yes 06/12/25 0745   Dressing Status Clean, dry & intact 06/12/25 0745   Dressing Type Transparent 06/12/25 0745   Dressing Intervention Other (Comment) 06/12/25 0745        Opportunity for questions and clarification was provided.      TRANSFER - IN REPORT:    Verbal report received from RN on Tadeo Griffiths Lori  being received from BAUTISTA Zhang for routine post-op      Report consisted of patient's Situation, Background, Assessment and   Recommendations(SBAR).     Information from the following report(s) Nurse Handoff Report and MAR was reviewed with the receiving nurse.    Opportunity for questions and clarification was provided.      Assessment completed upon patient's arrival to unit and care assumed.

## 2025-06-12 NOTE — ANESTHESIA PRE PROCEDURE
Department of Anesthesiology  Preprocedure Note       Name:  Tadeo Griffiths Sr.   Age:  80 y.o.  :  1945                                          MRN:  [id number]         Date:  2025      Surgeon: Surgeon(s):  Dionisio Garcia DO    Procedure: Procedure(s):  SIGMOIDOSCOPY BIOPSY FLEXIBLE    Medications prior to admission:   Prior to Admission medications    Medication Sig Start Date End Date Taking? Authorizing Provider   omeprazole (PRILOSEC) 40 MG delayed release capsule Take 1 capsule by mouth in the morning and at bedtime 3/6/25  Yes Ang Wren DO   potassium chloride (KLOR-CON M) 10 MEQ extended release tablet Take 1 tablet by mouth daily 25  Yes Bailey Howard MD   lisinopril (PRINIVIL;ZESTRIL) 20 MG tablet Take 1 tablet by mouth in the morning and at bedtime 24  Yes Ang Wren DO   albuterol (PROVENTIL) (2.5 MG/3ML) 0.083% nebulizer solution Take 3 mLs by nebulization every 6 hours as needed for Wheezing 24  Yes Fernando Millan MD   BREZTRI AEROSPHERE 160-9-4.8 MCG/ACT AERO Inhale 2 each into the lungs in the morning and at bedtime 24  Yes Aliza Butler S, APRN - CNP   clopidogrel (PLAVIX) 75 MG tablet Take 1 tablet by mouth daily 24  Yes Ang Wren DO   doxazosin (CARDURA) 8 MG tablet Take 1 tablet by mouth 2 times daily 24 Yes Ang Wren DO   ferrous sulfate (IRON 325) 325 (65 Fe) MG tablet Take 1 tablet by mouth every morning (before breakfast) 24  Yes Ang Wren DO   pravastatin (PRAVACHOL) 80 MG tablet Take 1 tablet by mouth daily 24  Yes Ang Wren DO   tamsulosin (FLOMAX) 0.4 MG capsule Take 1 capsule by mouth daily 10/17/24  Yes Anthony Pierce MD   amLODIPine (NORVASC) 2.5 MG tablet TAKE 1 TABLET BY MOUTH EVERY DAY 24  Yes Bailey Howard MD   furosemide (LASIX) 20 MG tablet Take 1 tablet by mouth daily 24  Yes Bailey Howard MD   apixaban

## 2025-06-12 NOTE — ANESTHESIA POSTPROCEDURE EVALUATION
Department of Anesthesiology  Postprocedure Note    Patient: Tadeo Griffiths Sr.  MRN: [id number]  YOB: 1945  Date of evaluation: 6/12/2025    Procedure Summary       Date: 06/12/25 Room / Location: [facility] 05 / [facility] ENDOSCOPY    Anesthesia Start: 1056 Anesthesia Stop: 1110    Procedure: SIGMOIDOSCOPY FLEXIBLE Diagnosis:       Rectal bleeding      (Rectal bleeding [K62.5])    Surgeons: Dionisio Garcia DO Responsible Provider: Sigifredo Bojorquez MD    Anesthesia Type: TIVA ASA Status: 3 - Emergent            Anesthesia Type: No value filed.    Emilio Phase I: Emilio Score: 10    Emilio Phase II: Emilio Score: 10    Anesthesia Post Evaluation    Patient location during evaluation: PACU  Patient participation: complete - patient participated  Level of consciousness: awake and alert  Airway patency: patent  Nausea: well controlled.  Cardiovascular status: acceptable.  Respiratory status: acceptable  Hydration status: stable  Pain management: adequate    No notable events documented.

## 2025-06-13 LAB
ANION GAP SERPL CALC-SCNC: 8 MMOL/L (ref 7–16)
BUN SERPL-MCNC: 9 MG/DL (ref 8–23)
CALCIUM SERPL-MCNC: 8.4 MG/DL (ref 8.8–10.2)
CHLORIDE SERPL-SCNC: 99 MMOL/L (ref 98–107)
CO2 SERPL-SCNC: 26 MMOL/L (ref 20–29)
CREAT SERPL-MCNC: 0.75 MG/DL (ref 0.8–1.3)
ERYTHROCYTE [DISTWIDTH] IN BLOOD BY AUTOMATED COUNT: 13.9 % (ref 11.9–14.6)
GLUCOSE SERPL-MCNC: 113 MG/DL (ref 70–99)
HCT VFR BLD AUTO: 26.6 % (ref 41.1–50.3)
HGB BLD-MCNC: 9 G/DL (ref 13.6–17.2)
MAGNESIUM SERPL-MCNC: 1.8 MG/DL (ref 1.8–2.4)
MCH RBC QN AUTO: 30.2 PG (ref 26.1–32.9)
MCHC RBC AUTO-ENTMCNC: 33.8 G/DL (ref 31.4–35)
MCV RBC AUTO: 89.3 FL (ref 82–102)
NRBC # BLD: 0 K/UL (ref 0–0.2)
PLATELET # BLD AUTO: 142 K/UL (ref 150–450)
PMV BLD AUTO: 9.2 FL (ref 9.4–12.3)
POTASSIUM SERPL-SCNC: 3.4 MMOL/L (ref 3.5–5.1)
RBC # BLD AUTO: 2.98 M/UL (ref 4.23–5.6)
SODIUM SERPL-SCNC: 133 MMOL/L (ref 136–145)
WBC # BLD AUTO: 4.1 K/UL (ref 4.3–11.1)

## 2025-06-13 PROCEDURE — 2500000003 HC RX 250 WO HCPCS: Performed by: INTERNAL MEDICINE

## 2025-06-13 PROCEDURE — 85027 COMPLETE CBC AUTOMATED: CPT

## 2025-06-13 PROCEDURE — 83735 ASSAY OF MAGNESIUM: CPT

## 2025-06-13 PROCEDURE — 6370000000 HC RX 637 (ALT 250 FOR IP): Performed by: INTERNAL MEDICINE

## 2025-06-13 PROCEDURE — 6370000000 HC RX 637 (ALT 250 FOR IP): Performed by: FAMILY MEDICINE

## 2025-06-13 PROCEDURE — 36415 COLL VENOUS BLD VENIPUNCTURE: CPT

## 2025-06-13 PROCEDURE — 6360000002 HC RX W HCPCS: Performed by: INTERNAL MEDICINE

## 2025-06-13 PROCEDURE — 1170000001 HC RM PRIVATE ONCOLOGY W/TELEMETRY

## 2025-06-13 PROCEDURE — 2500000003 HC RX 250 WO HCPCS: Performed by: FAMILY MEDICINE

## 2025-06-13 PROCEDURE — 80048 BASIC METABOLIC PNL TOTAL CA: CPT

## 2025-06-13 PROCEDURE — 6360000002 HC RX W HCPCS: Performed by: FAMILY MEDICINE

## 2025-06-13 PROCEDURE — 94761 N-INVAS EAR/PLS OXIMETRY MLT: CPT

## 2025-06-13 PROCEDURE — 94640 AIRWAY INHALATION TREATMENT: CPT

## 2025-06-13 PROCEDURE — 2500000003 HC RX 250 WO HCPCS: Performed by: ANESTHESIOLOGY

## 2025-06-13 RX ORDER — MAGNESIUM SULFATE IN WATER 40 MG/ML
2000 INJECTION, SOLUTION INTRAVENOUS ONCE
Status: COMPLETED | OUTPATIENT
Start: 2025-06-13 | End: 2025-06-13

## 2025-06-13 RX ORDER — POTASSIUM CHLORIDE 1500 MG/1
40 TABLET, EXTENDED RELEASE ORAL EVERY 4 HOURS
Status: COMPLETED | OUTPATIENT
Start: 2025-06-13 | End: 2025-06-13

## 2025-06-13 RX ADMIN — Medication 50 MG: at 09:44

## 2025-06-13 RX ADMIN — PRAVASTATIN SODIUM 80 MG: 20 TABLET ORAL at 20:25

## 2025-06-13 RX ADMIN — SODIUM CHLORIDE, PRESERVATIVE FREE 10 ML: 5 INJECTION INTRAVENOUS at 07:26

## 2025-06-13 RX ADMIN — TAMSULOSIN HYDROCHLORIDE 0.4 MG: 0.4 CAPSULE ORAL at 09:45

## 2025-06-13 RX ADMIN — ARFORMOTEROL TARTRATE 15 MCG: 15 SOLUTION RESPIRATORY (INHALATION) at 07:22

## 2025-06-13 RX ADMIN — PANTOPRAZOLE SODIUM 40 MG: 40 INJECTION, POWDER, FOR SOLUTION INTRAVENOUS at 20:25

## 2025-06-13 RX ADMIN — PANTOPRAZOLE SODIUM 40 MG: 40 INJECTION, POWDER, FOR SOLUTION INTRAVENOUS at 07:26

## 2025-06-13 RX ADMIN — BUDESONIDE INHALATION 500 MCG: 0.5 SUSPENSION RESPIRATORY (INHALATION) at 21:25

## 2025-06-13 RX ADMIN — DOXAZOSIN 8 MG: 4 TABLET ORAL at 09:44

## 2025-06-13 RX ADMIN — IPRATROPIUM BROMIDE 0.5 MG: 0.5 SOLUTION RESPIRATORY (INHALATION) at 15:19

## 2025-06-13 RX ADMIN — DOXAZOSIN 8 MG: 4 TABLET ORAL at 20:25

## 2025-06-13 RX ADMIN — BUDESONIDE INHALATION 500 MCG: 0.5 SUSPENSION RESPIRATORY (INHALATION) at 07:22

## 2025-06-13 RX ADMIN — SODIUM CHLORIDE, PRESERVATIVE FREE 10 ML: 5 INJECTION INTRAVENOUS at 20:37

## 2025-06-13 RX ADMIN — AMLODIPINE BESYLATE 2.5 MG: 5 TABLET ORAL at 09:45

## 2025-06-13 RX ADMIN — ARFORMOTEROL TARTRATE 15 MCG: 15 SOLUTION RESPIRATORY (INHALATION) at 21:25

## 2025-06-13 RX ADMIN — IPRATROPIUM BROMIDE 0.5 MG: 0.5 SOLUTION RESPIRATORY (INHALATION) at 07:22

## 2025-06-13 RX ADMIN — LISINOPRIL 20 MG: 20 TABLET ORAL at 20:25

## 2025-06-13 RX ADMIN — POTASSIUM CHLORIDE 40 MEQ: 1500 TABLET, EXTENDED RELEASE ORAL at 11:47

## 2025-06-13 RX ADMIN — WATER 2 G: 1 INJECTION INTRAMUSCULAR; INTRAVENOUS; SUBCUTANEOUS at 11:47

## 2025-06-13 RX ADMIN — MULTIVITAMIN TABLET 1 TABLET: TABLET at 09:45

## 2025-06-13 RX ADMIN — POTASSIUM CHLORIDE 40 MEQ: 1500 TABLET, EXTENDED RELEASE ORAL at 09:44

## 2025-06-13 RX ADMIN — WATER 1000 MG: 1 INJECTION INTRAMUSCULAR; INTRAVENOUS; SUBCUTANEOUS at 09:44

## 2025-06-13 RX ADMIN — SODIUM CHLORIDE, PRESERVATIVE FREE 10 ML: 5 INJECTION INTRAVENOUS at 20:26

## 2025-06-13 RX ADMIN — Medication 500 MG: at 09:45

## 2025-06-13 RX ADMIN — WATER 2 G: 1 INJECTION INTRAMUSCULAR; INTRAVENOUS; SUBCUTANEOUS at 12:15

## 2025-06-13 RX ADMIN — FUROSEMIDE 20 MG: 20 TABLET ORAL at 09:45

## 2025-06-13 RX ADMIN — APIXABAN 5 MG: 5 TABLET, FILM COATED ORAL at 20:25

## 2025-06-13 RX ADMIN — APIXABAN 5 MG: 5 TABLET, FILM COATED ORAL at 09:45

## 2025-06-13 RX ADMIN — IPRATROPIUM BROMIDE 0.5 MG: 0.5 SOLUTION RESPIRATORY (INHALATION) at 21:25

## 2025-06-13 RX ADMIN — FERROUS SULFATE TAB 325 MG (65 MG ELEMENTAL FE) 325 MG: 325 (65 FE) TAB at 07:26

## 2025-06-13 RX ADMIN — MAGNESIUM SULFATE HEPTAHYDRATE 2000 MG: 40 INJECTION, SOLUTION INTRAVENOUS at 10:07

## 2025-06-13 RX ADMIN — CALCIUM CARBONATE-VITAMIN D TAB 500 MG-200 UNIT 1 TABLET: 500-200 TAB at 09:44

## 2025-06-13 RX ADMIN — LISINOPRIL 20 MG: 20 TABLET ORAL at 09:45

## 2025-06-13 NOTE — PROGRESS NOTES
Physician Progress Note      PATIENT:               FADUMO THOMPSON  CSN #:                  [id number]  :                       1945  ADMIT DATE:       6/10/2025 9:07 PM  DISCH DATE:  RESPONDING  PROVIDER #:        Eden Iyer MD          QUERY TEXT:    Based on your medical judgment, please clarify these findings and document if   any of the following are being evaluated and/or treated:    The clinical indicators include:  Paroxysmal A-fib    80 y.o. male, HTN, HX of MI    TX: Eliquis  Options provided:  -- Secondary hypercoagulable state in a patient with atrial fibrillation  -- Other - I will add my own diagnosis  -- Disagree - Not applicable / Not valid  -- Disagree - Clinically unable to determine / Unknown  -- Refer to Clinical Documentation Reviewer    PROVIDER RESPONSE TEXT:    This patient has secondary hypercoagulable state in a patient with atrial   fibrillation.    Query created by: Deann Lane on 2025 12:50 PM      Electronically signed by:  Eden Iyer MD 2025 1:00 PM

## 2025-06-13 NOTE — PLAN OF CARE
Problem: Safety - Adult  Goal: Free from fall injury  Outcome: Progressing     Problem: Skin/Tissue Integrity - Adult  Goal: Skin integrity remains intact  Outcome: Progressing  Goal: Oral mucous membranes remain intact  Outcome: Progressing     Problem: Gastrointestinal - Adult  Goal: Minimal or absence of nausea and vomiting  Outcome: Progressing  Goal: Maintains or returns to baseline bowel function  Outcome: Progressing  Goal: Maintains adequate nutritional intake  Outcome: Progressing     Problem: Hematologic - Adult  Goal: Maintains hematologic stability  Outcome: Progressing     Problem: Pain  Goal: Verbalizes/displays adequate comfort level or baseline comfort level  Outcome: Progressing

## 2025-06-13 NOTE — PROGRESS NOTES
Physician Progress Note      PATIENT:               FADUMO THOMPSON  CSN #:                  [id number]  :                       1945  ADMIT DATE:       6/10/2025 9:07 PM  DISCH DATE:  RESPONDING  PROVIDER #:        Eden Iyer MD          QUERY TEXT:    Based on your medical judgment, please clarify these findings and document if   any of the following are being evaluated and/or treated:    The clinical indicators include:  80 y.o. male with medical history of Atrial fibrillation Eliquis, history of   ventral hernia repair in the past, presents to [facility] [place] because   of abdominal pain and distention associated with bloody stools for the last 2   days patient H&H has dropped by about a gram in comparison to the blood test   done a month ago. Admitted with lower GI bleed    WBC 3.5, RBC 3.44, H&H 10.5, 31.5, PLT's 114   IM progress note \"Status post sigmoidoscopy-radiation proctitis as per   GI, I do not have the final report yet\"   GI consult \"Has a hx of prostate CA and last radiation treatment was in   .\"    TX: Serial labs, GI consult, sigmoidoscopy  Options provided:  -- Pancytopenia related to, Please specify  -- Other - I will add my own diagnosis  -- Disagree - Not applicable / Not valid  -- Disagree - Clinically unable to determine / Unknown  -- Refer to Clinical Documentation Reviewer    PROVIDER RESPONSE TEXT:    Patient has pancytopenia related to unclear etiology    Query created by: Deann Lane on 2025 12:59 PM      Electronically signed by:  Eden Iyer MD 2025 2:58 PM

## 2025-06-13 NOTE — CARE COORDINATION
Chart reviewed by CM for continued stay. No supportive care orders received or CM needs identified. Pt will return home when medically stable for discharge. Anticipated discharge within 24hrs.  Please consult CM for any needs that may arise.

## 2025-06-14 VITALS
HEIGHT: 69 IN | OXYGEN SATURATION: 99 % | DIASTOLIC BLOOD PRESSURE: 72 MMHG | RESPIRATION RATE: 16 BRPM | HEART RATE: 101 BPM | BODY MASS INDEX: 29.4 KG/M2 | TEMPERATURE: 98.1 F | SYSTOLIC BLOOD PRESSURE: 111 MMHG | WEIGHT: 198.5 LBS

## 2025-06-14 LAB
ANION GAP SERPL CALC-SCNC: 11 MMOL/L (ref 7–16)
BUN SERPL-MCNC: 9 MG/DL (ref 8–23)
CALCIUM SERPL-MCNC: 9.2 MG/DL (ref 8.8–10.2)
CHLORIDE SERPL-SCNC: 99 MMOL/L (ref 98–107)
CO2 SERPL-SCNC: 25 MMOL/L (ref 20–29)
CREAT SERPL-MCNC: 0.77 MG/DL (ref 0.8–1.3)
ERYTHROCYTE [DISTWIDTH] IN BLOOD BY AUTOMATED COUNT: 14 % (ref 11.9–14.6)
GLUCOSE SERPL-MCNC: 102 MG/DL (ref 70–99)
HCT VFR BLD AUTO: 28.4 % (ref 41.1–50.3)
HGB BLD-MCNC: 9.3 G/DL (ref 13.6–17.2)
MAGNESIUM SERPL-MCNC: 1.9 MG/DL (ref 1.8–2.4)
MCH RBC QN AUTO: 30.1 PG (ref 26.1–32.9)
MCHC RBC AUTO-ENTMCNC: 32.7 G/DL (ref 31.4–35)
MCV RBC AUTO: 91.9 FL (ref 82–102)
NRBC # BLD: 0 K/UL (ref 0–0.2)
PLATELET # BLD AUTO: 139 K/UL (ref 150–450)
PMV BLD AUTO: 9.1 FL (ref 9.4–12.3)
POTASSIUM SERPL-SCNC: 4 MMOL/L (ref 3.5–5.1)
RBC # BLD AUTO: 3.09 M/UL (ref 4.23–5.6)
SODIUM SERPL-SCNC: 135 MMOL/L (ref 136–145)
WBC # BLD AUTO: 3.2 K/UL (ref 4.3–11.1)

## 2025-06-14 PROCEDURE — 80048 BASIC METABOLIC PNL TOTAL CA: CPT

## 2025-06-14 PROCEDURE — 36415 COLL VENOUS BLD VENIPUNCTURE: CPT

## 2025-06-14 PROCEDURE — 2500000003 HC RX 250 WO HCPCS: Performed by: ANESTHESIOLOGY

## 2025-06-14 PROCEDURE — 6360000002 HC RX W HCPCS: Performed by: FAMILY MEDICINE

## 2025-06-14 PROCEDURE — 6360000002 HC RX W HCPCS: Performed by: INTERNAL MEDICINE

## 2025-06-14 PROCEDURE — 94760 N-INVAS EAR/PLS OXIMETRY 1: CPT

## 2025-06-14 PROCEDURE — 83735 ASSAY OF MAGNESIUM: CPT

## 2025-06-14 PROCEDURE — 2500000003 HC RX 250 WO HCPCS: Performed by: FAMILY MEDICINE

## 2025-06-14 PROCEDURE — 94640 AIRWAY INHALATION TREATMENT: CPT

## 2025-06-14 PROCEDURE — 2500000003 HC RX 250 WO HCPCS: Performed by: INTERNAL MEDICINE

## 2025-06-14 PROCEDURE — 6370000000 HC RX 637 (ALT 250 FOR IP): Performed by: INTERNAL MEDICINE

## 2025-06-14 PROCEDURE — 85027 COMPLETE CBC AUTOMATED: CPT

## 2025-06-14 PROCEDURE — 6370000000 HC RX 637 (ALT 250 FOR IP): Performed by: FAMILY MEDICINE

## 2025-06-14 RX ORDER — MAGNESIUM SULFATE IN WATER 40 MG/ML
2000 INJECTION, SOLUTION INTRAVENOUS ONCE
Status: DISCONTINUED | OUTPATIENT
Start: 2025-06-14 | End: 2025-06-14

## 2025-06-14 RX ORDER — POTASSIUM CHLORIDE 1500 MG/1
40 TABLET, EXTENDED RELEASE ORAL EVERY 4 HOURS
Status: DISCONTINUED | OUTPATIENT
Start: 2025-06-14 | End: 2025-06-14

## 2025-06-14 RX ORDER — POLYETHYLENE GLYCOL 3350 17 G/17G
17 POWDER, FOR SOLUTION ORAL DAILY
Status: DISCONTINUED | OUTPATIENT
Start: 2025-06-14 | End: 2025-06-14 | Stop reason: HOSPADM

## 2025-06-14 RX ORDER — SUCRALFATE 1 G/1
2 TABLET ORAL DAILY
Qty: 24 TABLET | Refills: 0 | Status: SHIPPED | OUTPATIENT
Start: 2025-06-14 | End: 2025-06-26

## 2025-06-14 RX ORDER — METOPROLOL TARTRATE 25 MG/1
12.5 TABLET, FILM COATED ORAL EVERY 12 HOURS
Qty: 90 TABLET | Refills: 3
Start: 2025-06-14 | End: 2025-06-17 | Stop reason: SDUPTHER

## 2025-06-14 RX ORDER — MAGNESIUM OXIDE 400 MG/1
400 TABLET ORAL DAILY
Qty: 4 TABLET | Refills: 0 | Status: SHIPPED | OUTPATIENT
Start: 2025-06-14 | End: 2025-06-18

## 2025-06-14 RX ORDER — METOPROLOL TARTRATE 25 MG/1
12.5 TABLET, FILM COATED ORAL 2 TIMES DAILY
Status: DISCONTINUED | OUTPATIENT
Start: 2025-06-14 | End: 2025-06-14 | Stop reason: HOSPADM

## 2025-06-14 RX ADMIN — FERROUS SULFATE TAB 325 MG (65 MG ELEMENTAL FE) 325 MG: 325 (65 FE) TAB at 08:14

## 2025-06-14 RX ADMIN — SODIUM CHLORIDE, PRESERVATIVE FREE 10 ML: 5 INJECTION INTRAVENOUS at 08:15

## 2025-06-14 RX ADMIN — PANTOPRAZOLE SODIUM 40 MG: 40 INJECTION, POWDER, FOR SOLUTION INTRAVENOUS at 08:15

## 2025-06-14 RX ADMIN — WATER 2 G: 1 INJECTION INTRAMUSCULAR; INTRAVENOUS; SUBCUTANEOUS at 10:41

## 2025-06-14 RX ADMIN — Medication 500 MG: at 08:14

## 2025-06-14 RX ADMIN — MULTIVITAMIN TABLET 1 TABLET: TABLET at 08:14

## 2025-06-14 RX ADMIN — BUDESONIDE INHALATION 500 MCG: 0.5 SUSPENSION RESPIRATORY (INHALATION) at 08:02

## 2025-06-14 RX ADMIN — APIXABAN 5 MG: 5 TABLET, FILM COATED ORAL at 08:14

## 2025-06-14 RX ADMIN — FUROSEMIDE 20 MG: 20 TABLET ORAL at 08:14

## 2025-06-14 RX ADMIN — ARFORMOTEROL TARTRATE 15 MCG: 15 SOLUTION RESPIRATORY (INHALATION) at 08:02

## 2025-06-14 RX ADMIN — IPRATROPIUM BROMIDE 0.5 MG: 0.5 SOLUTION RESPIRATORY (INHALATION) at 08:02

## 2025-06-14 RX ADMIN — POLYETHYLENE GLYCOL 3350 17 G: 17 POWDER, FOR SOLUTION ORAL at 10:41

## 2025-06-14 RX ADMIN — TAMSULOSIN HYDROCHLORIDE 0.4 MG: 0.4 CAPSULE ORAL at 08:14

## 2025-06-14 RX ADMIN — DOXAZOSIN 8 MG: 4 TABLET ORAL at 08:14

## 2025-06-14 RX ADMIN — METOPROLOL TARTRATE 12.5 MG: 25 TABLET, FILM COATED ORAL at 12:10

## 2025-06-14 RX ADMIN — CALCIUM CARBONATE-VITAMIN D TAB 500 MG-200 UNIT 1 TABLET: 500-200 TAB at 08:13

## 2025-06-14 RX ADMIN — WATER 1000 MG: 1 INJECTION INTRAMUSCULAR; INTRAVENOUS; SUBCUTANEOUS at 08:15

## 2025-06-14 RX ADMIN — Medication 50 MG: at 08:13

## 2025-06-14 NOTE — PLAN OF CARE
Problem: Safety - Adult  Goal: Free from fall injury  Outcome: Progressing     Problem: Skin/Tissue Integrity - Adult  Goal: Skin integrity remains intact  Outcome: Progressing  Goal: Oral mucous membranes remain intact  Outcome: Progressing     Problem: Gastrointestinal - Adult  Goal: Minimal or absence of nausea and vomiting  Outcome: Progressing  Goal: Maintains or returns to baseline bowel function  Outcome: Progressing  Goal: Maintains adequate nutritional intake  Outcome: Progressing     Problem: Hematologic - Adult  Goal: Maintains hematologic stability  Outcome: Progressing     Problem: Pain  Goal: Verbalizes/displays adequate comfort level or baseline comfort level  Outcome: Progressing     Problem: Respiratory - Adult  Goal: Achieves optimal ventilation and oxygenation  Outcome: Progressing

## 2025-06-14 NOTE — DISCHARGE SUMMARY
Hospitalist Discharge Summary   Admit Date:  6/10/2025  9:07 PM   DC Note date: 2025  Name:  Tadeo Griffiths Sr.   Age:  80 y.o.  Sex:  male  :  1945   MRN:  [id number]   Room:  [facility]  PCP:  Ang Wren DO    Presenting Complaint: Rectal Bleeding     Initial Admission Diagnosis: LGI bleed [K92.2]  Lower GI bleed [K92.2]  Anemia, unspecified type [D64.9]     Problem List for this Hospitalization (present on admission):    Principal Problem:    Lower GI bleed  Active Problems:    Gastroesophageal reflux disease with esophagitis    CAD in native artery    CAD (coronary artery disease)    Hx of CABG    Pure hypercholesterolemia    Prostate cancer (HCC)    Essential hypertension with goal blood pressure less than 140/90    COPD (chronic obstructive pulmonary disease) (HCC)    Paroxysmal atrial fibrillation (HCC)    Hypokalemia    Rectal bleeding  Resolved Problems:    * No resolved hospital problems. *    H&P  Tadeo Griffiths Sr. is a 80 y.o. male with medical history of Atrial fibrillation Eliquis, history of ventral hernia repair in the past, presents to [facility] [place] because of abdominal pain and distention associated with bloody stools for the last 2 days patient H&H has dropped by about a gram in comparison to the blood test done a month ago.  Patient was evaluated ER physician worked up in found to have a hemoglobin white count of 4.2 hemoglobin 11 hematocrit 33 platelets 139 BMP was essentially normal with a normal BUN and creatinine GFR was greater than 90 subsequently CT of the abdomen pelvis was done which suggested the rectal wall thickening suggesting possibly acute proctitis as well as enteritis and may be cystitis also and hence hospital service called to evaluate admit this patient  No exposure to children no exposure to camps etc. no travel  Hospital Course:  Patient was admitted for rectal bleeding, lower GI bleed, patient Eliquis Plavix and aspirin was held, GI was consulted.  Had

## 2025-06-14 NOTE — PLAN OF CARE
Problem: Respiratory - Adult  Goal: Achieves optimal ventilation and oxygenation  6/14/2025 0808 by Branden Harrington RCP  Outcome: Progressing  6/14/2025 0147 by Laquita Long RN  Outcome: Progressing

## 2025-06-14 NOTE — DISCHARGE INSTRUCTIONS
Patient is normally on potassium 10 meq daily, for the next 2 days advised to take 20 mEq daily and then go back to 10 mEq daily.  Advised to start Plavix in the next 3 to 4 days if no bleeding.  If any new melena/bright red rectal bleeding-DC Eliquis/aspirin/Plavix-call primary care physician or come back to the hospital.

## 2025-06-14 NOTE — PROGRESS NOTES
06/13/25 2145   NIV Type   NIV Started/Stopped On   Equipment Type Synchrony   Mode CPAP   Mask Size Medium   Assessment   Comfort Level Good   Using Accessory Muscles No   Mask Compliance Good   Skin Assessment Clean, dry, & intact   Skin Protection for O2 Device No   Settings/Measurements   Patient's Home Machine No   Alarm Settings   Alarms On Y     Pt placed on CPAP, RA, tolerated well.

## 2025-06-14 NOTE — CARE COORDINATION
Pt will discharge home today with family. Pt will need to F/U with his PCP and GI OP. No other needs have been verbalized or identified for this admission. Pt has met all goals of care and is in agreement with this discharge plan. 1st IM letter given on 6/12.   06/14/25 8842   Services At/After Discharge   Transition of Care Consult (CM Consult) Discharge Planning   Services At/After Discharge None    Resource Information Provided? No   Mode of Transport at Discharge Other (see comment)  (family)   Confirm Follow Up Transport Family   Condition of Participation: Discharge Planning   The Plan for Transition of Care is related to the following treatment goals: Pt will discharge home with family   The Patient and/or Patient Representative was provided with a Choice of Provider? Patient   The Patient and/Or Patient Representative agree with the Discharge Plan? Yes   Freedom of Choice list was provided with basic dialogue that supports the patient's individualized plan of care/goals, treatment preferences, and shares the quality data associated with the providers?  Yes

## 2025-06-14 NOTE — PROGRESS NOTES
Pt discharge is complete at this time. Discharge instructions and follow ups reviewed. Prescriptions reviewed. IV removed. Opportunity for questions given.

## 2025-06-16 ENCOUNTER — TELEPHONE (OUTPATIENT)
Dept: GASTROENTEROLOGY | Age: 80
End: 2025-06-16

## 2025-06-16 ENCOUNTER — OFFICE VISIT (OUTPATIENT)
Dept: FAMILY MEDICINE CLINIC | Facility: CLINIC | Age: 80
End: 2025-06-16
Payer: MEDICARE

## 2025-06-16 ENCOUNTER — TELEPHONE (OUTPATIENT)
Age: 80
End: 2025-06-16

## 2025-06-16 VITALS
HEIGHT: 69 IN | WEIGHT: 209 LBS | SYSTOLIC BLOOD PRESSURE: 100 MMHG | DIASTOLIC BLOOD PRESSURE: 70 MMHG | BODY MASS INDEX: 30.96 KG/M2

## 2025-06-16 DIAGNOSIS — I10 ESSENTIAL HYPERTENSION WITH GOAL BLOOD PRESSURE LESS THAN 140/90: ICD-10-CM

## 2025-06-16 DIAGNOSIS — D50.9 IRON DEFICIENCY ANEMIA, UNSPECIFIED IRON DEFICIENCY ANEMIA TYPE: ICD-10-CM

## 2025-06-16 DIAGNOSIS — G62.9 NEUROPATHY: ICD-10-CM

## 2025-06-16 DIAGNOSIS — M79.605 LOWER EXTREMITY PAIN, BILATERAL: ICD-10-CM

## 2025-06-16 DIAGNOSIS — I25.10 CAD IN NATIVE ARTERY: ICD-10-CM

## 2025-06-16 DIAGNOSIS — M79.604 LOWER EXTREMITY PAIN, BILATERAL: ICD-10-CM

## 2025-06-16 DIAGNOSIS — K21.00 GASTROESOPHAGEAL REFLUX DISEASE WITH ESOPHAGITIS WITHOUT HEMORRHAGE: Primary | ICD-10-CM

## 2025-06-16 DIAGNOSIS — I48.3 TYPICAL ATRIAL FLUTTER (HCC): ICD-10-CM

## 2025-06-16 DIAGNOSIS — E78.00 PURE HYPERCHOLESTEROLEMIA: ICD-10-CM

## 2025-06-16 PROCEDURE — 3074F SYST BP LT 130 MM HG: CPT | Performed by: FAMILY MEDICINE

## 2025-06-16 PROCEDURE — 1111F DSCHRG MED/CURRENT MED MERGE: CPT | Performed by: FAMILY MEDICINE

## 2025-06-16 PROCEDURE — 99215 OFFICE O/P EST HI 40 MIN: CPT | Performed by: FAMILY MEDICINE

## 2025-06-16 PROCEDURE — 1123F ACP DISCUSS/DSCN MKR DOCD: CPT | Performed by: FAMILY MEDICINE

## 2025-06-16 PROCEDURE — G8427 DOCREV CUR MEDS BY ELIG CLIN: HCPCS | Performed by: FAMILY MEDICINE

## 2025-06-16 PROCEDURE — G8417 CALC BMI ABV UP PARAM F/U: HCPCS | Performed by: FAMILY MEDICINE

## 2025-06-16 PROCEDURE — 3078F DIAST BP <80 MM HG: CPT | Performed by: FAMILY MEDICINE

## 2025-06-16 PROCEDURE — 1159F MED LIST DOCD IN RCRD: CPT | Performed by: FAMILY MEDICINE

## 2025-06-16 PROCEDURE — 1036F TOBACCO NON-USER: CPT | Performed by: FAMILY MEDICINE

## 2025-06-16 RX ORDER — LISINOPRIL 20 MG/1
20 TABLET ORAL 2 TIMES DAILY
Qty: 180 TABLET | Refills: 3 | Status: SHIPPED | OUTPATIENT
Start: 2025-06-16 | End: 2026-06-11

## 2025-06-16 RX ORDER — OMEPRAZOLE 40 MG/1
40 CAPSULE, DELAYED RELEASE ORAL 2 TIMES DAILY
Qty: 180 CAPSULE | Refills: 3 | Status: SHIPPED | OUTPATIENT
Start: 2025-06-16 | End: 2026-06-11

## 2025-06-16 RX ORDER — PRAVASTATIN SODIUM 80 MG/1
80 TABLET ORAL DAILY
Qty: 90 TABLET | Refills: 3 | Status: SHIPPED | OUTPATIENT
Start: 2025-06-16 | End: 2026-06-11

## 2025-06-16 RX ORDER — CLOPIDOGREL BISULFATE 75 MG/1
75 TABLET ORAL DAILY
Qty: 90 TABLET | Refills: 3 | Status: SHIPPED | OUTPATIENT
Start: 2025-06-16 | End: 2026-06-11

## 2025-06-16 RX ORDER — DOXAZOSIN 8 MG/1
8 TABLET ORAL 2 TIMES DAILY
Qty: 180 TABLET | Refills: 3 | Status: SHIPPED | OUTPATIENT
Start: 2025-06-16 | End: 2026-06-11

## 2025-06-16 RX ORDER — LANOLIN ALCOHOL/MO/W.PET/CERES
400 CREAM (GRAM) TOPICAL DAILY
COMMUNITY
Start: 2025-06-14 | End: 2025-06-18

## 2025-06-16 RX ORDER — FERROUS SULFATE 325(65) MG
325 TABLET ORAL
Qty: 90 TABLET | Refills: 3 | Status: CANCELLED | OUTPATIENT
Start: 2025-06-16

## 2025-06-16 ASSESSMENT — PATIENT HEALTH QUESTIONNAIRE - PHQ9
2. FEELING DOWN, DEPRESSED OR HOPELESS: NOT AT ALL
SUM OF ALL RESPONSES TO PHQ QUESTIONS 1-9: 0
SUM OF ALL RESPONSES TO PHQ QUESTIONS 1-9: 0
1. LITTLE INTEREST OR PLEASURE IN DOING THINGS: NOT AT ALL
SUM OF ALL RESPONSES TO PHQ QUESTIONS 1-9: 0
SUM OF ALL RESPONSES TO PHQ QUESTIONS 1-9: 0

## 2025-06-16 ASSESSMENT — ENCOUNTER SYMPTOMS
NAUSEA: 0
SHORTNESS OF BREATH: 0
VOMITING: 0

## 2025-06-16 NOTE — PROGRESS NOTES
Neurology PROGRESS NOTE    SUBJECTIVE:   Tadeo Griffiths Sr. is a 80 y.o. male seen for a follow up visit regarding the following chief complaint:     Chief Complaint   Patient presents with    Follow-up           HPI patient presents to the office today and from the beginning of the walking into the room patient was upset and stated that our office staff is rude possibly racist because he notices that other people get treated better than he does apparently he showed up late today and we told him that we will recommend which started the whole incident.  Patient is also confused between neuropathy and is being seen by a chiropractor he wants to start a treatment that cost over $6000 and insurance does not cover it he is also upset with our vascular surgeon that told him that he does not want to see them anymore and that he does not have circulation issues even though the employee at [employer] told him that he has circulation issues.  Today's visit was mostly to go over his lab results that he had done      Past Medical History, Past Surgical History, Family history, Social History, and Medications were all reviewed with the patient today and updated as necessary.       Current Outpatient Medications   Medication Sig Dispense Refill    magnesium oxide (MAG-OX) 400 (240 Mg) MG tablet Take 1 tablet by mouth daily      omeprazole (PRILOSEC) 40 MG delayed release capsule Take 1 capsule by mouth in the morning and at bedtime 180 capsule 3    lisinopril (PRINIVIL;ZESTRIL) 20 MG tablet Take 1 tablet by mouth in the morning and at bedtime 180 tablet 3    doxazosin (CARDURA) 8 MG tablet Take 1 tablet by mouth 2 times daily 180 tablet 3    clopidogrel (PLAVIX) 75 MG tablet Take 1 tablet by mouth daily 90 tablet 3    pravastatin (PRAVACHOL) 80 MG tablet Take 1 tablet by mouth daily 90 tablet 3    sucralfate (CARAFATE) 1 GM tablet Place 2 tablets rectally daily for 12 days .Mix sterile water SOLN 20 mL with

## 2025-06-17 ENCOUNTER — TELEPHONE (OUTPATIENT)
Dept: FAMILY MEDICINE CLINIC | Facility: CLINIC | Age: 80
End: 2025-06-17

## 2025-06-17 DIAGNOSIS — I10 ESSENTIAL HYPERTENSION WITH GOAL BLOOD PRESSURE LESS THAN 140/90: ICD-10-CM

## 2025-06-17 RX ORDER — METOPROLOL TARTRATE 25 MG/1
12.5 TABLET, FILM COATED ORAL EVERY 12 HOURS
Qty: 180 TABLET | Refills: 0 | Status: SHIPPED | OUTPATIENT
Start: 2025-06-17 | End: 2025-09-15

## 2025-06-18 ENCOUNTER — OFFICE VISIT (OUTPATIENT)
Age: 80
End: 2025-06-18
Payer: MEDICARE

## 2025-06-18 VITALS
RESPIRATION RATE: 16 BRPM | SYSTOLIC BLOOD PRESSURE: 116 MMHG | WEIGHT: 211 LBS | HEART RATE: 65 BPM | BODY MASS INDEX: 31.25 KG/M2 | OXYGEN SATURATION: 95 % | DIASTOLIC BLOOD PRESSURE: 66 MMHG | HEIGHT: 69 IN

## 2025-06-18 DIAGNOSIS — K59.00 CONSTIPATION, UNSPECIFIED CONSTIPATION TYPE: ICD-10-CM

## 2025-06-18 DIAGNOSIS — R10.13 EPIGASTRIC ABDOMINAL PAIN: ICD-10-CM

## 2025-06-18 DIAGNOSIS — K62.7 RADIATION PROCTITIS: Primary | ICD-10-CM

## 2025-06-18 PROCEDURE — G8417 CALC BMI ABV UP PARAM F/U: HCPCS | Performed by: INTERNAL MEDICINE

## 2025-06-18 PROCEDURE — 1123F ACP DISCUSS/DSCN MKR DOCD: CPT | Performed by: INTERNAL MEDICINE

## 2025-06-18 PROCEDURE — 3078F DIAST BP <80 MM HG: CPT | Performed by: INTERNAL MEDICINE

## 2025-06-18 PROCEDURE — 3074F SYST BP LT 130 MM HG: CPT | Performed by: INTERNAL MEDICINE

## 2025-06-18 PROCEDURE — G8427 DOCREV CUR MEDS BY ELIG CLIN: HCPCS | Performed by: INTERNAL MEDICINE

## 2025-06-18 PROCEDURE — 1159F MED LIST DOCD IN RCRD: CPT | Performed by: INTERNAL MEDICINE

## 2025-06-18 PROCEDURE — 99204 OFFICE O/P NEW MOD 45 MIN: CPT | Performed by: INTERNAL MEDICINE

## 2025-06-18 PROCEDURE — 1111F DSCHRG MED/CURRENT MED MERGE: CPT | Performed by: INTERNAL MEDICINE

## 2025-06-18 PROCEDURE — 1036F TOBACCO NON-USER: CPT | Performed by: INTERNAL MEDICINE

## 2025-06-18 NOTE — PATIENT INSTRUCTIONS
Continue Carafate enemas as recommended for two weeks, if ongoing issues we can consider repeat flex sig in outpatient setting with APC therapy (to help treat the proctitis)     Avoid constipation and take Colace if firm stools and Miralax if constipation, Benefiber daily, drink plenty of water     EGD with dilation/biopsies while off Eliquis for 48 hrs and Plavix for a week if possible    Smaller, more frequent, lower residue meals,     Probiotic daily, IB-bairon over the counter (peppermint pill)     Continue with Prilosec at least once a day

## 2025-06-18 NOTE — PROGRESS NOTES
Tadeo Griffiths Sr. (:  1945) is a 80 y.o. male new patient referred to our office for evaluation of the following chief complaint(s):  New Patient      Assessment & Plan   ASSESSMENT/PLAN:  1) Follow up- recent hospitalization for rectal bleeding, s/p flex sig that showed radiation proctitis which was also seen in  during colonoscopy. Patient was started on Carafate enemas x 2 weeks.     2) Dysphagia- intermittent and mentioned during consultation, no prior dilation performed and patient is able to tolerate regular diet. No weight loss. States that he is concerned about his epigastric pain and would like to have an EGD.      Plan  Continue Carafate enemas as recommended for two weeks, if ongoing issues we can consider repeat flex sig in outpatient setting with APC therapy (to help treat the proctitis)     Avoid constipation and take Colace if firm stools and Miralax if constipation, Benefiber daily, drink plenty of water     EGD with dilation/biopsies while off Eliquis for 48 hrs and Plavix for a week if possible    Smaller, more frequent, lower residue meals,     Probiotic daily, IB-bairon over the counter (peppermint pill)     Continue with Prilosec at least once a day     Subjective   SUBJECTIVE/OBJECTIVE  Tadeo Griffiths Sr. is a 80 y.o. year old male presents to the GI clinic in follow up after recent hospitalization. Patient was seen by rectal bleeding and underwent a flex sig that showed radiation proctitis. Patient was started on Carafate retention enemas for this.     As per recent GI consult note-     \"80-year-old male with history of prostate cancer and radiation proctitis. He reported that he was having some issue with constipation and after an enema had some rectal bleeding. Plan for flexible sigmoidoscopy tomorrow. His last dose of Eliquis and Plavix were yesterday. This will be a diagnostic procedure only. But I suspect he has ongoing radiation proctitis and would recommend sucralfate enemas\".

## 2025-06-19 ENCOUNTER — PREP FOR PROCEDURE (OUTPATIENT)
Dept: GASTROENTEROLOGY | Age: 80
End: 2025-06-19

## 2025-06-19 DIAGNOSIS — R10.13 EPIGASTRIC ABDOMINAL PAIN: ICD-10-CM

## 2025-06-19 DIAGNOSIS — K62.7 RADIATION PROCTITIS: ICD-10-CM

## 2025-06-19 PROBLEM — K59.00 CONSTIPATION: Status: ACTIVE | Noted: 2025-06-19

## 2025-06-19 RX ORDER — SODIUM CHLORIDE 9 MG/ML
25 INJECTION, SOLUTION INTRAVENOUS PRN
Status: CANCELLED | OUTPATIENT
Start: 2025-06-19

## 2025-06-19 RX ORDER — SODIUM CHLORIDE 0.9 % (FLUSH) 0.9 %
5-40 SYRINGE (ML) INJECTION PRN
Status: CANCELLED | OUTPATIENT
Start: 2025-06-19

## 2025-06-19 RX ORDER — SODIUM CHLORIDE 0.9 % (FLUSH) 0.9 %
5-40 SYRINGE (ML) INJECTION EVERY 12 HOURS SCHEDULED
Status: CANCELLED | OUTPATIENT
Start: 2025-06-19

## 2025-06-30 ENCOUNTER — TELEPHONE (OUTPATIENT)
Age: 80
End: 2025-06-30

## 2025-06-30 NOTE — TELEPHONE ENCOUNTER
Called and spoke with patient he is scheduled for an EGD and wants to know how long he needs to be off of blood thinners. Advised he should have to office reach out to our office.

## 2025-06-30 NOTE — TELEPHONE ENCOUNTER
During call regarding EGD patient stated he is having some swelling and is concerned it could be due to his Amlodipine.

## 2025-06-30 NOTE — TELEPHONE ENCOUNTER
Patient called stating he has the following request :    Would like a call back from Dr Romeo staff  Subject : non Specific  No details given  Please call and advise.

## 2025-06-30 NOTE — TELEPHONE ENCOUNTER
KARI from pt stating he is having swelling in his feet and ankles and is wanting to know if its from [facility]? Pt said his BP is has been good. Nurse will check with Dr Romeo and then will let pt know what to do.

## 2025-07-01 ENCOUNTER — TELEPHONE (OUTPATIENT)
Dept: FAMILY MEDICINE CLINIC | Facility: CLINIC | Age: 80
End: 2025-07-01

## 2025-07-01 ENCOUNTER — APPOINTMENT (OUTPATIENT)
Dept: CT IMAGING | Age: 80
End: 2025-07-01
Payer: MEDICARE

## 2025-07-01 ENCOUNTER — HOSPITAL ENCOUNTER (EMERGENCY)
Age: 80
Discharge: HOME | End: 2025-07-01
Attending: STUDENT IN AN ORGANIZED HEALTH CARE EDUCATION/TRAINING PROGRAM
Payer: MEDICARE

## 2025-07-01 VITALS
DIASTOLIC BLOOD PRESSURE: 76 MMHG | HEART RATE: 98 BPM | SYSTOLIC BLOOD PRESSURE: 155 MMHG | HEIGHT: 69 IN | RESPIRATION RATE: 16 BRPM | WEIGHT: 211 LBS | OXYGEN SATURATION: 98 % | BODY MASS INDEX: 31.25 KG/M2 | TEMPERATURE: 98.6 F

## 2025-07-01 DIAGNOSIS — K62.5 RECTAL BLEEDING: ICD-10-CM

## 2025-07-01 DIAGNOSIS — N28.1 RENAL CYST: ICD-10-CM

## 2025-07-01 DIAGNOSIS — I70.1 RENAL ARTERY STENOSIS: ICD-10-CM

## 2025-07-01 DIAGNOSIS — K55.1 MESENTERIC ARTERY STENOSIS: ICD-10-CM

## 2025-07-01 DIAGNOSIS — E27.9 ADRENAL NODULE: Primary | ICD-10-CM

## 2025-07-01 LAB
ALBUMIN SERPL-MCNC: 3.6 G/DL (ref 3.2–4.6)
ALBUMIN/GLOB SERPL: 1.1 (ref 1–1.9)
ALP SERPL-CCNC: 61 U/L (ref 40–129)
ALT SERPL-CCNC: 11 U/L (ref 8–55)
ANION GAP SERPL CALC-SCNC: 10 MMOL/L (ref 7–16)
AST SERPL-CCNC: 21 U/L (ref 15–37)
BASOPHILS # BLD: 0.01 K/UL (ref 0–0.2)
BASOPHILS NFR BLD: 0.3 % (ref 0–2)
BILIRUB SERPL-MCNC: 0.4 MG/DL (ref 0–1.2)
BUN SERPL-MCNC: 11 MG/DL (ref 8–23)
CALCIUM SERPL-MCNC: 9 MG/DL (ref 8.8–10.2)
CHLORIDE SERPL-SCNC: 95 MMOL/L (ref 98–107)
CO2 SERPL-SCNC: 25 MMOL/L (ref 20–29)
CREAT SERPL-MCNC: 0.84 MG/DL (ref 0.8–1.3)
DIFFERENTIAL METHOD BLD: ABNORMAL
EOSINOPHIL # BLD: 0.04 K/UL (ref 0–0.8)
EOSINOPHIL NFR BLD: 1.1 % (ref 0.5–7.8)
ERYTHROCYTE [DISTWIDTH] IN BLOOD BY AUTOMATED COUNT: 15.6 % (ref 11.9–14.6)
GLOBULIN SER CALC-MCNC: 3.2 G/DL (ref 2.3–3.5)
GLUCOSE SERPL-MCNC: 95 MG/DL (ref 70–99)
HCT VFR BLD AUTO: 30.9 % (ref 41.1–50.3)
HGB BLD-MCNC: 9.8 G/DL (ref 13.6–17.2)
IMM GRANULOCYTES # BLD AUTO: 0.01 K/UL (ref 0–0.5)
IMM GRANULOCYTES NFR BLD AUTO: 0.3 % (ref 0–5)
LACTATE SERPL-SCNC: 0.6 MMOL/L (ref 0.5–2)
LIPASE SERPL-CCNC: 21 U/L (ref 13–60)
LYMPHOCYTES # BLD: 0.48 K/UL (ref 0.5–4.6)
LYMPHOCYTES NFR BLD: 13 % (ref 13–44)
MCH RBC QN AUTO: 31.2 PG (ref 26.1–32.9)
MCHC RBC AUTO-ENTMCNC: 31.7 G/DL (ref 31.4–35)
MCV RBC AUTO: 98.4 FL (ref 82–102)
MONOCYTES # BLD: 0.61 K/UL (ref 0.1–1.3)
MONOCYTES NFR BLD: 16.5 % (ref 4–12)
NEUTS SEG # BLD: 2.54 K/UL (ref 1.7–8.2)
NEUTS SEG NFR BLD: 68.8 % (ref 43–78)
NRBC # BLD: 0 K/UL (ref 0–0.2)
PLATELET # BLD AUTO: 141 K/UL (ref 150–450)
PMV BLD AUTO: 8.5 FL (ref 9.4–12.3)
POTASSIUM SERPL-SCNC: 4.3 MMOL/L (ref 3.5–5.1)
PROT SERPL-MCNC: 6.8 G/DL (ref 6.3–8.2)
RBC # BLD AUTO: 3.14 M/UL (ref 4.23–5.6)
SODIUM SERPL-SCNC: 130 MMOL/L (ref 136–145)
WBC # BLD AUTO: 3.7 K/UL (ref 4.3–11.1)

## 2025-07-01 PROCEDURE — 80053 COMPREHEN METABOLIC PANEL: CPT

## 2025-07-01 PROCEDURE — 99285 EMERGENCY DEPT VISIT HI MDM: CPT

## 2025-07-01 PROCEDURE — 6360000004 HC RX CONTRAST MEDICATION: Performed by: STUDENT IN AN ORGANIZED HEALTH CARE EDUCATION/TRAINING PROGRAM

## 2025-07-01 PROCEDURE — 85025 COMPLETE CBC W/AUTO DIFF WBC: CPT

## 2025-07-01 PROCEDURE — 83605 ASSAY OF LACTIC ACID: CPT

## 2025-07-01 PROCEDURE — 83690 ASSAY OF LIPASE: CPT

## 2025-07-01 PROCEDURE — 74178 CT ABD&PLV WO CNTR FLWD CNTR: CPT

## 2025-07-01 RX ORDER — IOPAMIDOL 755 MG/ML
100 INJECTION, SOLUTION INTRAVASCULAR
Status: COMPLETED | OUTPATIENT
Start: 2025-07-01 | End: 2025-07-01

## 2025-07-01 RX ADMIN — IOPAMIDOL 100 ML: 755 INJECTION, SOLUTION INTRAVENOUS at 17:31

## 2025-07-01 ASSESSMENT — ENCOUNTER SYMPTOMS
ABDOMINAL PAIN: 0
BLOOD IN STOOL: 1
SORE THROAT: 0
VOMITING: 0
EYE PAIN: 0
SHORTNESS OF BREATH: 0
NAUSEA: 0
COUGH: 0
EYE REDNESS: 0

## 2025-07-01 ASSESSMENT — PAIN - FUNCTIONAL ASSESSMENT: PAIN_FUNCTIONAL_ASSESSMENT: 0-10

## 2025-07-01 ASSESSMENT — PAIN SCALES - GENERAL
PAINLEVEL_OUTOF10: 0
PAINLEVEL_OUTOF10: 0

## 2025-07-01 NOTE — TELEPHONE ENCOUNTER
Patient called c/o blood in stool. Told him to call his GI doctor. Patient states he did this morning and hasn't heard back. Told him to call back. Patient states he is going to go to the ER, because the GI told him to go if he saw blood in stool. I told him he needs to follow what the Gi doctor told him. Patient verbalized understanding.

## 2025-07-01 NOTE — ED PROVIDER NOTES
Emergency Department Provider Note       [facility] EMERGENCY DEPT   PCP: Ang Wren DO   Age: 80 y.o.   Sex: male     DISPOSITION Decision To Discharge 07/01/2025 08:59:07 PM    ICD-10-CM    1. Adrenal nodule  E27.9       2. Renal cyst  N28.1       3. Renal artery stenosis  I70.1 [facility]      4. 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 Rectal bleeding  K62.5           Medical Decision Making         80-year-old male presents to the ED for rectal bleeding.  Vital signs within normal limits, no significant tachycardia or hypotension to suggest acute GI bleed.  Physical exam does not demonstrate any brisk bleeding, no thrombosed external hemorrhoids noted at this time.  Hemoccult negative.  Recently admitted on 6/12 with flex sigmoidoscopy that demonstrated internal hemorrhoids and possible proctitis, is scheduled to follow-up with GI.  Has been using Carafate enemas since discharge.  Hemoglobin today is 9.8, actually increased from most recent values.  CT angiogram obtained that demonstrates renal artery stenosis, mesenteric artery stenosis, adrenal nodule, renal cyst all patient was informed of, but no obvious GI bleed at this time.  Has not had any recurrent bleeding while in emergency department.  Do not feel that he requires repeat admission or emergent GI evaluation/colonoscopy.  Patient does have close follow-up with GI as an outpatient.  Given that I do not see any bleeding, do not feel that he needs to stop his anticoagulation at this time, reviewed the risks of patient including brisk GI bleed resulting in symptomatic anemia/exsanguination for which she voices understanding.  Hemodynamic stable with no recurrent episodes of hematochezia while in emergency department.    Patient was instructed to follow-up with PCP in the next 24 to 48 hours and to follow-up with all specialists given with discharge as soon as  112

## 2025-07-01 NOTE — ED TRIAGE NOTES
Patient arrives to ED complaining of blood on the toilet paper after he wiped. Patient was recently in the hospital for a rectal bleed. Patient is on Eliquis.

## 2025-07-03 ENCOUNTER — TELEPHONE (OUTPATIENT)
Dept: GASTROENTEROLOGY | Age: 80
End: 2025-07-03

## 2025-07-09 NOTE — PROGRESS NOTES
Patient verified name, , and procedure.    Type: 1A; abbreviated assessment per anesthesia guidelines.    Labs per anesthesia: POCT Potassium- held for DOS.  EKG: held for DOS, per anesthesia guidelines.     Instructed pt that they will be notified the day before their procedure by the GI Lab for time of arrival if their procedure is [place] and Pre-op for [place] cases. Arrival times should be called by 5 pm. If no phone is received the patient should contact their respective hospital. The GI lab telephone number is [phone] and ES Pre-op is [phone].     Follow diet and prep instructions per office, including NPO status.    Bath or shower the night before and the am of surgery with non-moisturizing soap. No lotions, oils, powders, perfumes, or cologne on skin. No make up, eye make up or jewelry. Wear loose fitting comfortable, clean clothing.     Must have adult present in building the entire time .     Medications to take on the day of procedure: Acetaminophen- if needed, Aspirin, Nitroglycerin- if needed, Albuterol nebulizer, Breztri inhaler, Doxazosin, Metoprolol, Amlodipine, Omeprazole, per anesthesia guidelines.    Medications to hold: None, per anesthesia guidelines.    Patient instructed to hold all vitamins/supplements 7 days prior to surgery and NSAIDS 5 days prior to surgery. Verbalized understanding.     Pt is taking a blood thinner/ anticoagulant: Apixaban & Clopidogrel.   General, local & regional anesthesia: The surgeon will advise you on whether you will continue this medication or hold this medication prior to surgery. If you have not been advised, you should contact your surgeon.    The following discharge instructions reviewed with patient: medication given during procedure may cause drowsiness for several hours, therefore, do not drive or operate machinery for remainder of the day, no alcohol on the day of your procedure, resume regular diet and activity unless otherwise directed, for mild

## 2025-07-11 ENCOUNTER — HOSPITAL ENCOUNTER (OUTPATIENT)
Age: 80
Setting detail: OUTPATIENT SURGERY
Discharge: HOME | End: 2025-07-11
Attending: INTERNAL MEDICINE | Admitting: INTERNAL MEDICINE
Payer: MEDICARE

## 2025-07-11 ENCOUNTER — ANESTHESIA EVENT (OUTPATIENT)
Dept: ENDOSCOPY | Age: 80
End: 2025-07-11
Payer: MEDICARE

## 2025-07-11 ENCOUNTER — ANESTHESIA (OUTPATIENT)
Dept: ENDOSCOPY | Age: 80
End: 2025-07-11
Payer: MEDICARE

## 2025-07-11 VITALS
BODY MASS INDEX: 30.18 KG/M2 | HEIGHT: 69 IN | DIASTOLIC BLOOD PRESSURE: 68 MMHG | TEMPERATURE: 97.9 F | HEART RATE: 67 BPM | OXYGEN SATURATION: 93 % | SYSTOLIC BLOOD PRESSURE: 115 MMHG | RESPIRATION RATE: 18 BRPM | WEIGHT: 203.8 LBS

## 2025-07-11 DIAGNOSIS — K55.1 CHRONIC MESENTERIC ISCHEMIA: Primary | ICD-10-CM

## 2025-07-11 DIAGNOSIS — R10.9 ABDOMINAL PAIN, UNSPECIFIED ABDOMINAL LOCATION: ICD-10-CM

## 2025-07-11 DIAGNOSIS — R93.89 ABNORMAL FINDINGS ON IMAGING TEST: ICD-10-CM

## 2025-07-11 PROBLEM — R13.10 DYSPHAGIA: Status: ACTIVE | Noted: 2025-07-11

## 2025-07-11 LAB — POTASSIUM BLD-SCNC: 3.6 MMOL/L (ref 3.5–5.1)

## 2025-07-11 PROCEDURE — 3609012400 HC EGD TRANSORAL BIOPSY SINGLE/MULTIPLE: Performed by: INTERNAL MEDICINE

## 2025-07-11 PROCEDURE — 88305 TISSUE EXAM BY PATHOLOGIST: CPT

## 2025-07-11 PROCEDURE — 6360000002 HC RX W HCPCS: Performed by: NURSE ANESTHETIST, CERTIFIED REGISTERED

## 2025-07-11 PROCEDURE — 3700000001 HC ADD 15 MINUTES (ANESTHESIA): Performed by: INTERNAL MEDICINE

## 2025-07-11 PROCEDURE — 3700000000 HC ANESTHESIA ATTENDED CARE: Performed by: INTERNAL MEDICINE

## 2025-07-11 PROCEDURE — 2709999900 HC NON-CHARGEABLE SUPPLY: Performed by: INTERNAL MEDICINE

## 2025-07-11 PROCEDURE — 84132 ASSAY OF SERUM POTASSIUM: CPT

## 2025-07-11 PROCEDURE — 7100000010 HC PHASE II RECOVERY - FIRST 15 MIN: Performed by: INTERNAL MEDICINE

## 2025-07-11 PROCEDURE — 2580000003 HC RX 258: Performed by: ANESTHESIOLOGY

## 2025-07-11 PROCEDURE — 7100000011 HC PHASE II RECOVERY - ADDTL 15 MIN: Performed by: INTERNAL MEDICINE

## 2025-07-11 RX ORDER — LIDOCAINE HYDROCHLORIDE 20 MG/ML
INJECTION, SOLUTION EPIDURAL; INFILTRATION; INTRACAUDAL; PERINEURAL
Status: DISCONTINUED | OUTPATIENT
Start: 2025-07-11 | End: 2025-07-11 | Stop reason: SDUPTHER

## 2025-07-11 RX ORDER — LIDOCAINE HYDROCHLORIDE 10 MG/ML
1 INJECTION, SOLUTION INFILTRATION; PERINEURAL
Status: DISCONTINUED | OUTPATIENT
Start: 2025-07-11 | End: 2025-07-11 | Stop reason: HOSPADM

## 2025-07-11 RX ORDER — SODIUM CHLORIDE 0.9 % (FLUSH) 0.9 %
5-40 SYRINGE (ML) INJECTION EVERY 12 HOURS SCHEDULED
Status: DISCONTINUED | OUTPATIENT
Start: 2025-07-11 | End: 2025-07-11 | Stop reason: HOSPADM

## 2025-07-11 RX ORDER — SODIUM CHLORIDE, SODIUM LACTATE, POTASSIUM CHLORIDE, CALCIUM CHLORIDE 600; 310; 30; 20 MG/100ML; MG/100ML; MG/100ML; MG/100ML
INJECTION, SOLUTION INTRAVENOUS CONTINUOUS
Status: DISCONTINUED | OUTPATIENT
Start: 2025-07-11 | End: 2025-07-11 | Stop reason: HOSPADM

## 2025-07-11 RX ORDER — SODIUM CHLORIDE 9 MG/ML
INJECTION, SOLUTION INTRAVENOUS PRN
Status: DISCONTINUED | OUTPATIENT
Start: 2025-07-11 | End: 2025-07-11 | Stop reason: HOSPADM

## 2025-07-11 RX ORDER — SODIUM CHLORIDE 0.9 % (FLUSH) 0.9 %
5-40 SYRINGE (ML) INJECTION PRN
Status: DISCONTINUED | OUTPATIENT
Start: 2025-07-11 | End: 2025-07-11 | Stop reason: HOSPADM

## 2025-07-11 RX ORDER — PROPOFOL 10 MG/ML
INJECTION, EMULSION INTRAVENOUS
Status: DISCONTINUED | OUTPATIENT
Start: 2025-07-11 | End: 2025-07-11 | Stop reason: SDUPTHER

## 2025-07-11 RX ADMIN — PROPOFOL 50 MG: 10 INJECTION, EMULSION INTRAVENOUS at 11:21

## 2025-07-11 RX ADMIN — SODIUM CHLORIDE, SODIUM LACTATE, POTASSIUM CHLORIDE, AND CALCIUM CHLORIDE: .6; .31; .03; .02 INJECTION, SOLUTION INTRAVENOUS at 09:52

## 2025-07-11 RX ADMIN — PROPOFOL 50 MG: 10 INJECTION, EMULSION INTRAVENOUS at 11:13

## 2025-07-11 RX ADMIN — PROPOFOL 50 MG: 10 INJECTION, EMULSION INTRAVENOUS at 11:11

## 2025-07-11 RX ADMIN — PROPOFOL 50 MG: 10 INJECTION, EMULSION INTRAVENOUS at 11:17

## 2025-07-11 RX ADMIN — LIDOCAINE HYDROCHLORIDE 100 MG: 20 INJECTION, SOLUTION EPIDURAL; INFILTRATION; INTRACAUDAL; PERINEURAL at 11:11

## 2025-07-11 ASSESSMENT — PAIN - FUNCTIONAL ASSESSMENT: PAIN_FUNCTIONAL_ASSESSMENT: 0-10

## 2025-07-11 NOTE — ANESTHESIA PRE PROCEDURE
Normally on plavix, eliquis - last dose yesterday): atrial fibrillation, hyperlipidemia        Rhythm: regular  Rate: normal                 ROS comment: [facility] 7/2024- 2 of 2 patent bypass grafts   No LV gram due to poor catheter control from tortuosity of the left subclavian system   Severe native two-vessel disease    Echo 2024 - EF of 55 - 60%. Left ventricle size is normal. Mildly increased wall thickness. Normal wall motion. Abnormal diastolic function.  ·  Tricuspid Valve: Mild regurgitation. The estimated RVSP is 25 mmHg         Neuro/Psych:                ROS comment: Neuropathy GI/Hepatic/Renal:   (+) GERD: well controlled          Endo/Other:    (+) blood dyscrasia (mild thrombocytopenia): anemia, arthritis: OA..                 Abdominal:              PE comment: Deferred   Vascular: negative vascular ROS.         Other Findings:             Anesthesia Plan      TIVA     ASA 3       Induction: intravenous.      Anesthetic plan and risks discussed with patient.                        Azam White MD   7/11/2025

## 2025-07-11 NOTE — H&P
HISTORY AND PHYSICAL             Date: 7/11/2025        Patient Name: Tadeo Griffiths Sr.     YOB: 1945      Age:  80 y.o.      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  2/20 Barium Swallow Barium swallow today to assess for any signs of possible torsion. If no torsion present, will likel    Elevated hemoglobin A1c 2/20/2015    6.1 - 2/17/15     Full dentures 2/21/2015    GERD (gastroesophageal reflux disease)     controlled with med    Hernia of abdominal cavity 6/26/2015    Hernia, incisional     History of atrial flutter 8/2015    ablation--- followed by dr hankins    [facility]    Hypercholesterolemia     Hyperlipidemia 2/18/2015    Hypertension     controlled with med    MI, old 2/2015    NSTEMI (non-ST elevated myocardial infarction) ([facility]) 2/14/2015    OA (osteoarthritis) 2/21/2015    Obstructive sleep apnea     Poor historian     Postoperative anemia due to acute blood loss 2/19/2015    S/P CABG x 2 2/2015    Sleep apnea     wears CPAP at night    Thrombocytopenia due to extra corporeal by-pass circulation 2/19/2015    Patient denies    Varicose veins of lower extremities with other complications 4/22/2014    7/24/15 (Dr Wall) L [facility] RFA ablation 5/15/14 (Dr. Wall) R [facility] Radiofrequency ablation     Wears

## 2025-07-11 NOTE — DISCHARGE INSTRUCTIONS
Gastrointestinal Esophagogastroduodenoscopy (EGD) - Upper Exam Discharge Instructions    1. Call Dr. Piña at [phone]  for any problems or questions.    2. Contact the doctor's office for follow up appointment as directed.    3. Medication may cause drowsiness for several hours, therefore:  Do not drive or operate machinery for remainder of the day.    No alcohol today.  Do not make any important or legal decisions for 24 hours.  Do not sign any legal documents for 24 hours.    5. For your diet, please chew well, take small bites with meals, liquids with all meds and pills. Resume normal activity tomorrow. Rest is encouraged today.     6. For mild soreness in your throat you may use Cepacol throat lozenges or warm salt-water gargles as needed.

## 2025-07-11 NOTE — ANESTHESIA POSTPROCEDURE EVALUATION
Department of Anesthesiology  Postprocedure Note    Patient: Tadeo Griffiths Sr.  MRN: [id number]  YOB: 1945  Date of evaluation: 7/11/2025    Procedure Summary       Date: 07/11/25 Room / Location: [facility] ENDO 01 / [facility] ENDOSCOPY    Anesthesia Start: 1105 Anesthesia Stop: 1137    Procedure: ESOPHAGOGASTRODUODENOSCOPY BIOPSY (Upper GI Region) Diagnosis:       Radiation proctitis      Epigastric abdominal pain      Constipation, unspecified constipation type      (Radiation proctitis [K62.7])      (Epigastric abdominal pain [R10.13])      (Constipation, unspecified constipation type [K59.00])    Surgeons: Saul Piña MD Responsible Provider: Azam White MD    Anesthesia Type: TIVA ASA Status: 3            Anesthesia Type: TIVA    Emilio Phase I: Emilio Score: 10    Emilio Phase II: Emilio Score: 10    Anesthesia Post Evaluation    Patient location during evaluation: PACU  Patient participation: complete - patient participated  Level of consciousness: awake and alert  Airway patency: patent  Nausea & Vomiting: no nausea and no vomiting  Cardiovascular status: hemodynamically stable  Respiratory status: acceptable, nonlabored ventilation and spontaneous ventilation  Hydration status: euvolemic  Comments: /68   Pulse 67   Temp 97.9 °F (36.6 °C) (Temporal)   Resp 18   Ht 1.753 m (5' 9\")   Wt 92.4 kg (203 lb 12.8 oz)   SpO2 93%   BMI 30.10 kg/m²     Multimodal analgesia pain management approach  Pain management: adequate and satisfactory to patient    No notable events documented.

## 2025-07-15 ENCOUNTER — OFFICE VISIT (OUTPATIENT)
Age: 80
End: 2025-07-15

## 2025-07-15 VITALS
DIASTOLIC BLOOD PRESSURE: 88 MMHG | BODY MASS INDEX: 30.21 KG/M2 | WEIGHT: 204 LBS | HEIGHT: 69 IN | HEART RATE: 86 BPM | SYSTOLIC BLOOD PRESSURE: 148 MMHG

## 2025-07-15 DIAGNOSIS — K55.1 SUPERIOR MESENTERIC ARTERY STENOSIS: ICD-10-CM

## 2025-07-15 DIAGNOSIS — I10 HYPERTENSION, UNSPECIFIED TYPE: ICD-10-CM

## 2025-07-15 DIAGNOSIS — I70.1 BILATERAL RENAL ARTERY STENOSIS: Primary | ICD-10-CM

## 2025-07-15 DIAGNOSIS — Z86.79 HISTORY OF ATHEROSCLEROSIS: ICD-10-CM

## 2025-07-15 DIAGNOSIS — I77.4 CELIAC ARTERY STENOSIS: ICD-10-CM

## 2025-07-15 PROCEDURE — 1036F TOBACCO NON-USER: CPT | Performed by: RADIOLOGY

## 2025-07-15 NOTE — PATIENT INSTRUCTIONS
We will see you in 1 year to discuss your symptoms and order a new CT if you are having any worsening hypertension, abdominal pain, or weight loss

## 2025-07-17 NOTE — PROGRESS NOTES
Passive Exposure  Past      Smokeless Tobacco Use  Never              Alcohol History       Alcohol Use Status  Not Currently              Drug Use       Drug Use Status  Never              Sexual Activity       Sexually Active  Defer                    Review of Systems:  Review of Systems   Constitutional:  Negative for unexpected weight change.   Gastrointestinal:         Patient denies any postprandial pain.  Complains of constipation   All other systems reviewed and are negative.       Current Medications:    Current Outpatient Medications   Medication Sig Dispense Refill    Cholecalciferol (VITAMIN D-3 PO) Take by mouth daily      metoprolol tartrate (LOPRESSOR) 25 MG tablet Take 0.5 tablets by mouth in the morning and 0.5 tablets in the evening. 180 tablet 0    omeprazole (PRILOSEC) 40 MG delayed release capsule Take 1 capsule by mouth in the morning and at bedtime 180 capsule 3    lisinopril (PRINIVIL;ZESTRIL) 20 MG tablet Take 1 tablet by mouth in the morning and at bedtime 180 tablet 3    doxazosin (CARDURA) 8 MG tablet Take 1 tablet by mouth 2 times daily 180 tablet 3    clopidogrel (PLAVIX) 75 MG tablet Take 1 tablet by mouth daily 90 tablet 3    pravastatin (PRAVACHOL) 80 MG tablet Take 1 tablet by mouth daily (Patient taking differently: Take 1 tablet by mouth at bedtime) 90 tablet 3    potassium chloride (KLOR-CON M) 10 MEQ extended release tablet Take 1 tablet by mouth daily 90 tablet 3    Misc. Devices (CPAP MACHINE) MISC by Does not apply route      albuterol (PROVENTIL) (2.5 MG/3ML) 0.083% nebulizer solution Take 3 mLs by nebulization every 6 hours as needed for Wheezing 360 each 3    BREZTRI AEROSPHERE 160-9-4.8 MCG/ACT AERO Inhale 2 each into the lungs in the morning and at bedtime 3 each 3    ferrous sulfate (IRON 325) 325 (65 Fe) MG tablet Take 1 tablet by mouth every morning (before breakfast) 90 tablet 3    tamsulosin (FLOMAX) 0.4 MG capsule Take 1 capsule by mouth daily (Patient taking

## 2025-07-18 RX ORDER — FUROSEMIDE 20 MG/1
20 TABLET ORAL DAILY
Qty: 90 TABLET | Refills: 3 | Status: SHIPPED | OUTPATIENT
Start: 2025-07-18

## 2025-07-18 NOTE — TELEPHONE ENCOUNTER
Requested Prescriptions     Pending Prescriptions Disp Refills    furosemide (LASIX) 20 MG tablet [Pharmacy Med Name: FUROSEMIDE 20MG TABLETS] 90 tablet 3     Sig: TAKE 1 TABLET BY MOUTH DAILY     Rx verified last ov 2/25/25

## 2025-07-28 ENCOUNTER — OFFICE VISIT (OUTPATIENT)
Dept: PULMONOLOGY | Age: 80
End: 2025-07-28
Payer: MEDICARE

## 2025-07-28 VITALS
BODY MASS INDEX: 30.07 KG/M2 | WEIGHT: 203 LBS | SYSTOLIC BLOOD PRESSURE: 136 MMHG | HEIGHT: 69 IN | HEART RATE: 85 BPM | OXYGEN SATURATION: 97 % | DIASTOLIC BLOOD PRESSURE: 81 MMHG | RESPIRATION RATE: 18 BRPM | TEMPERATURE: 97.2 F

## 2025-07-28 DIAGNOSIS — J44.9 CHRONIC OBSTRUCTIVE PULMONARY DISEASE, UNSPECIFIED COPD TYPE (HCC): Primary | ICD-10-CM

## 2025-07-28 DIAGNOSIS — R06.09 DYSPNEA ON EXERTION: ICD-10-CM

## 2025-07-28 DIAGNOSIS — J98.6 ELEVATED HEMIDIAPHRAGM: ICD-10-CM

## 2025-07-28 DIAGNOSIS — Z87.891 FORMER TOBACCO USE: ICD-10-CM

## 2025-07-28 DIAGNOSIS — G47.33 OSA (OBSTRUCTIVE SLEEP APNEA): ICD-10-CM

## 2025-07-28 PROCEDURE — 99214 OFFICE O/P EST MOD 30 MIN: CPT | Performed by: NURSE PRACTITIONER

## 2025-07-28 PROCEDURE — 3023F SPIROM DOC REV: CPT | Performed by: NURSE PRACTITIONER

## 2025-07-28 PROCEDURE — 1159F MED LIST DOCD IN RCRD: CPT | Performed by: NURSE PRACTITIONER

## 2025-07-28 PROCEDURE — G8427 DOCREV CUR MEDS BY ELIG CLIN: HCPCS | Performed by: NURSE PRACTITIONER

## 2025-07-28 PROCEDURE — 3075F SYST BP GE 130 - 139MM HG: CPT | Performed by: NURSE PRACTITIONER

## 2025-07-28 PROCEDURE — 1123F ACP DISCUSS/DSCN MKR DOCD: CPT | Performed by: NURSE PRACTITIONER

## 2025-07-28 PROCEDURE — G8417 CALC BMI ABV UP PARAM F/U: HCPCS | Performed by: NURSE PRACTITIONER

## 2025-07-28 PROCEDURE — 1126F AMNT PAIN NOTED NONE PRSNT: CPT | Performed by: NURSE PRACTITIONER

## 2025-07-28 PROCEDURE — 3079F DIAST BP 80-89 MM HG: CPT | Performed by: NURSE PRACTITIONER

## 2025-07-28 PROCEDURE — 1036F TOBACCO NON-USER: CPT | Performed by: NURSE PRACTITIONER

## 2025-07-28 RX ORDER — ALBUTEROL SULFATE 0.83 MG/ML
2.5 SOLUTION RESPIRATORY (INHALATION) EVERY 6 HOURS PRN
Qty: 360 EACH | Refills: 3 | Status: SHIPPED | OUTPATIENT
Start: 2025-07-28

## 2025-07-28 RX ORDER — BUDESONIDE, GLYCOPYRROLATE, AND FORMOTEROL FUMARATE 160; 9; 4.8 UG/1; UG/1; UG/1
2 AEROSOL, METERED RESPIRATORY (INHALATION) 2 TIMES DAILY
Qty: 3 EACH | Refills: 3 | Status: SHIPPED | OUTPATIENT
Start: 2025-07-28

## 2025-07-28 NOTE — PROGRESS NOTES
Name:  Tadeo Griffiths Sr.  YOB: 1945   MRN: [id number]      Office Visit: 7/28/2025        ASSESSMENT AND PLAN:  (Medical Decision Making)    Impression: 77-year-old male with mild remote smoking history, increased shortness of breath and COPD.    1. Dyspnea on exertion  --improved with Breztri and CPAP.   Suspect this is both elevated diaphragm and poorly controlled COPD  --Spirometry did show obstruction, but review of CT shows a significantly elevated left hemidiaphragm and post surgical repair of diaphragmatic hernia    2. Chronic obstructive pulmonary disease, unspecified COPD type (HCC)  --has been better with Breztri and likes better than ANY powder inhalers.  Have sent refills to his pharmacy.    3. Elevated hemidiaphragm  --Noted since at least 2018.  Is higher on the CT scan from 2021.    -- Referred to surgery after all for concerns of a diaphragmatic hernia.  He did undergo surgery, but did not get the improvement in his breathing like he was expecting.  -- Now on CPAP and doing much better.    4. Former tobacco use  --Patient with a very minimal smoking history.    I usually sit with Mr. Griffiths for quite a while to discuss all his concerns and addressed all his questions.  He usually has many and today had MANY questions regarding his daughters health.    Orders Placed This Encounter   Medications    albuterol (PROVENTIL) (2.5 MG/3ML) 0.083% nebulizer solution     Sig: Take 3 mLs by nebulization every 6 hours as needed for Wheezing     Dispense:  360 each     Refill:  3    BREZTRI AEROSPHERE 160-9-4.8 MCG/ACT AERO     Sig: Inhale 2 each into the lungs in the morning and at bedtime     Dispense:  3 each     Refill:  3       No orders of the defined types were placed in this encounter.      Follow-up and Dispositions    Return in about 6 months (around 1/28/2026) for COPD, dyspnea.           MISTI Atkinson - CNP    Collaborating physician is Kyree Gimenez,

## 2025-08-08 ENCOUNTER — HOSPITAL ENCOUNTER (INPATIENT)
Age: 80
LOS: 1 days | Discharge: HOME | DRG: 394 | End: 2025-08-10
Attending: EMERGENCY MEDICINE | Admitting: FAMILY MEDICINE
Payer: MEDICARE

## 2025-08-08 DIAGNOSIS — E87.1 HYPONATREMIA: ICD-10-CM

## 2025-08-08 DIAGNOSIS — K62.5 RECTAL BLEEDING: Primary | ICD-10-CM

## 2025-08-08 PROCEDURE — 80053 COMPREHEN METABOLIC PANEL: CPT

## 2025-08-08 PROCEDURE — 83690 ASSAY OF LIPASE: CPT

## 2025-08-08 PROCEDURE — 85025 COMPLETE CBC W/AUTO DIFF WBC: CPT

## 2025-08-08 PROCEDURE — 99285 EMERGENCY DEPT VISIT HI MDM: CPT

## 2025-08-08 ASSESSMENT — PAIN - FUNCTIONAL ASSESSMENT: PAIN_FUNCTIONAL_ASSESSMENT: NONE - DENIES PAIN

## 2025-08-09 ENCOUNTER — APPOINTMENT (OUTPATIENT)
Dept: CT IMAGING | Age: 80
DRG: 394 | End: 2025-08-09
Payer: MEDICARE

## 2025-08-09 PROBLEM — K64.8 INTERNAL HEMORRHOIDS: Status: ACTIVE | Noted: 2025-08-09

## 2025-08-09 PROBLEM — E87.1 HYPONATREMIA: Status: ACTIVE | Noted: 2025-08-09

## 2025-08-09 LAB
ABO + RH BLD: NORMAL
ALBUMIN SERPL-MCNC: 3.6 G/DL (ref 3.2–4.6)
ALBUMIN/GLOB SERPL: 1.2 (ref 1–1.9)
ALP SERPL-CCNC: 59 U/L (ref 40–129)
ALT SERPL-CCNC: 13 U/L (ref 8–55)
ANION GAP SERPL CALC-SCNC: 10 MMOL/L (ref 7–16)
ANION GAP SERPL CALC-SCNC: 10 MMOL/L (ref 7–16)
AST SERPL-CCNC: 25 U/L (ref 15–37)
BASOPHILS # BLD: 0.02 K/UL (ref 0–0.2)
BASOPHILS # BLD: 0.03 K/UL (ref 0–0.2)
BASOPHILS NFR BLD: 0.6 % (ref 0–2)
BASOPHILS NFR BLD: 0.7 % (ref 0–2)
BILIRUB SERPL-MCNC: 0.4 MG/DL (ref 0–1.2)
BLOOD GROUP ANTIBODIES SERPL: NORMAL
BUN SERPL-MCNC: 12 MG/DL (ref 8–23)
BUN SERPL-MCNC: 17 MG/DL (ref 8–23)
CALCIUM SERPL-MCNC: 8.5 MG/DL (ref 8.8–10.2)
CALCIUM SERPL-MCNC: 9.1 MG/DL (ref 8.8–10.2)
CHLORIDE SERPL-SCNC: 93 MMOL/L (ref 98–107)
CHLORIDE SERPL-SCNC: 96 MMOL/L (ref 98–107)
CO2 SERPL-SCNC: 23 MMOL/L (ref 20–29)
CO2 SERPL-SCNC: 24 MMOL/L (ref 20–29)
CREAT SERPL-MCNC: 0.6 MG/DL (ref 0.8–1.3)
CREAT SERPL-MCNC: 0.82 MG/DL (ref 0.8–1.3)
DIFFERENTIAL METHOD BLD: ABNORMAL
DIFFERENTIAL METHOD BLD: ABNORMAL
EOSINOPHIL # BLD: 0.04 K/UL (ref 0–0.8)
EOSINOPHIL # BLD: 0.05 K/UL (ref 0–0.8)
EOSINOPHIL NFR BLD: 1.2 % (ref 0.5–7.8)
EOSINOPHIL NFR BLD: 1.3 % (ref 0.5–7.8)
ERYTHROCYTE [DISTWIDTH] IN BLOOD BY AUTOMATED COUNT: 13.9 % (ref 11.9–14.6)
ERYTHROCYTE [DISTWIDTH] IN BLOOD BY AUTOMATED COUNT: 14 % (ref 11.9–14.6)
GLOBULIN SER CALC-MCNC: 2.9 G/DL (ref 2.3–3.5)
GLUCOSE SERPL-MCNC: 102 MG/DL (ref 70–99)
GLUCOSE SERPL-MCNC: 113 MG/DL (ref 70–99)
HCT VFR BLD AUTO: 26.2 % (ref 41.1–50.3)
HCT VFR BLD AUTO: 26.3 % (ref 41.1–50.3)
HCT VFR BLD AUTO: 28.6 % (ref 41.1–50.3)
HGB BLD-MCNC: 9 G/DL (ref 13.6–17.2)
HGB BLD-MCNC: 9.2 G/DL (ref 13.6–17.2)
HGB BLD-MCNC: 9.7 G/DL (ref 13.6–17.2)
IMM GRANULOCYTES # BLD AUTO: 0.02 K/UL (ref 0–0.5)
IMM GRANULOCYTES # BLD AUTO: 0.02 K/UL (ref 0–0.5)
IMM GRANULOCYTES NFR BLD AUTO: 0.5 % (ref 0–5)
IMM GRANULOCYTES NFR BLD AUTO: 0.6 % (ref 0–5)
INR PPP: 1.4
LIPASE SERPL-CCNC: 17 U/L (ref 13–60)
LYMPHOCYTES # BLD: 0.42 K/UL (ref 0.5–4.6)
LYMPHOCYTES # BLD: 0.55 K/UL (ref 0.5–4.6)
LYMPHOCYTES NFR BLD: 13.2 % (ref 13–44)
LYMPHOCYTES NFR BLD: 13.2 % (ref 13–44)
MCH RBC QN AUTO: 31.8 PG (ref 26.1–32.9)
MCH RBC QN AUTO: 32.1 PG (ref 26.1–32.9)
MCHC RBC AUTO-ENTMCNC: 33.9 G/DL (ref 31.4–35)
MCHC RBC AUTO-ENTMCNC: 34.4 G/DL (ref 31.4–35)
MCV RBC AUTO: 93.6 FL (ref 82–102)
MCV RBC AUTO: 93.8 FL (ref 82–102)
MONOCYTES # BLD: 0.54 K/UL (ref 0.1–1.3)
MONOCYTES # BLD: 0.76 K/UL (ref 0.1–1.3)
MONOCYTES NFR BLD: 17 % (ref 4–12)
MONOCYTES NFR BLD: 18.3 % (ref 4–12)
NEUTS SEG # BLD: 2.13 K/UL (ref 1.7–8.2)
NEUTS SEG # BLD: 2.75 K/UL (ref 1.7–8.2)
NEUTS SEG NFR BLD: 66.1 % (ref 43–78)
NEUTS SEG NFR BLD: 67.3 % (ref 43–78)
NRBC # BLD: 0 K/UL (ref 0–0.2)
NRBC # BLD: 0 K/UL (ref 0–0.2)
PLATELET # BLD AUTO: 117 K/UL (ref 150–450)
PLATELET # BLD AUTO: 152 K/UL (ref 150–450)
PMV BLD AUTO: 8.6 FL (ref 9.4–12.3)
PMV BLD AUTO: 8.9 FL (ref 9.4–12.3)
POTASSIUM SERPL-SCNC: 3.8 MMOL/L (ref 3.5–5.1)
POTASSIUM SERPL-SCNC: 4.2 MMOL/L (ref 3.5–5.1)
PROT SERPL-MCNC: 6.6 G/DL (ref 6.3–8.2)
PROTHROMBIN TIME: 16.7 SEC (ref 11.3–14.9)
RBC # BLD AUTO: 2.8 M/UL (ref 4.23–5.6)
RBC # BLD AUTO: 3.05 M/UL (ref 4.23–5.6)
SODIUM SERPL-SCNC: 127 MMOL/L (ref 136–145)
SODIUM SERPL-SCNC: 129 MMOL/L (ref 136–145)
SPECIMEN EXP DATE BLD: NORMAL
WBC # BLD AUTO: 3.2 K/UL (ref 4.3–11.1)
WBC # BLD AUTO: 4.2 K/UL (ref 4.3–11.1)

## 2025-08-09 PROCEDURE — 2700000000 HC OXYGEN THERAPY PER DAY

## 2025-08-09 PROCEDURE — 6360000004 HC RX CONTRAST MEDICATION: Performed by: EMERGENCY MEDICINE

## 2025-08-09 PROCEDURE — 6370000000 HC RX 637 (ALT 250 FOR IP): Performed by: PHYSICIAN ASSISTANT

## 2025-08-09 PROCEDURE — 94664 DEMO&/EVAL PT USE INHALER: CPT

## 2025-08-09 PROCEDURE — 86900 BLOOD TYPING SEROLOGIC ABO: CPT

## 2025-08-09 PROCEDURE — 85014 HEMATOCRIT: CPT

## 2025-08-09 PROCEDURE — 6370000000 HC RX 637 (ALT 250 FOR IP): Performed by: FAMILY MEDICINE

## 2025-08-09 PROCEDURE — 94760 N-INVAS EAR/PLS OXIMETRY 1: CPT

## 2025-08-09 PROCEDURE — 85025 COMPLETE CBC W/AUTO DIFF WBC: CPT

## 2025-08-09 PROCEDURE — 94761 N-INVAS EAR/PLS OXIMETRY MLT: CPT

## 2025-08-09 PROCEDURE — 2580000003 HC RX 258: Performed by: FAMILY MEDICINE

## 2025-08-09 PROCEDURE — 86850 RBC ANTIBODY SCREEN: CPT

## 2025-08-09 PROCEDURE — 99222 1ST HOSP IP/OBS MODERATE 55: CPT | Performed by: INTERNAL MEDICINE

## 2025-08-09 PROCEDURE — 2580000003 HC RX 258: Performed by: EMERGENCY MEDICINE

## 2025-08-09 PROCEDURE — 86901 BLOOD TYPING SEROLOGIC RH(D): CPT

## 2025-08-09 PROCEDURE — 85610 PROTHROMBIN TIME: CPT

## 2025-08-09 PROCEDURE — 1100000003 HC PRIVATE W/ TELEMETRY

## 2025-08-09 PROCEDURE — 6360000002 HC RX W HCPCS: Performed by: FAMILY MEDICINE

## 2025-08-09 PROCEDURE — 74177 CT ABD & PELVIS W/CONTRAST: CPT

## 2025-08-09 PROCEDURE — 96360 HYDRATION IV INFUSION INIT: CPT

## 2025-08-09 PROCEDURE — 85018 HEMOGLOBIN: CPT

## 2025-08-09 PROCEDURE — 36415 COLL VENOUS BLD VENIPUNCTURE: CPT

## 2025-08-09 PROCEDURE — 94640 AIRWAY INHALATION TREATMENT: CPT

## 2025-08-09 PROCEDURE — 2500000003 HC RX 250 WO HCPCS: Performed by: FAMILY MEDICINE

## 2025-08-09 RX ORDER — POLYETHYLENE GLYCOL 3350 17 G/17G
17 POWDER, FOR SOLUTION ORAL DAILY PRN
Status: DISCONTINUED | OUTPATIENT
Start: 2025-08-09 | End: 2025-08-10 | Stop reason: HOSPADM

## 2025-08-09 RX ORDER — CLOPIDOGREL BISULFATE 75 MG/1
75 TABLET ORAL DAILY
Status: DISCONTINUED | OUTPATIENT
Start: 2025-08-09 | End: 2025-08-10 | Stop reason: HOSPADM

## 2025-08-09 RX ORDER — PANTOPRAZOLE SODIUM 40 MG/1
40 TABLET, DELAYED RELEASE ORAL
Status: DISCONTINUED | OUTPATIENT
Start: 2025-08-09 | End: 2025-08-10 | Stop reason: HOSPADM

## 2025-08-09 RX ORDER — IOPAMIDOL 755 MG/ML
100 INJECTION, SOLUTION INTRAVASCULAR
Status: COMPLETED | OUTPATIENT
Start: 2025-08-09 | End: 2025-08-09

## 2025-08-09 RX ORDER — POTASSIUM CHLORIDE 1500 MG/1
40 TABLET, EXTENDED RELEASE ORAL PRN
Status: DISCONTINUED | OUTPATIENT
Start: 2025-08-09 | End: 2025-08-10 | Stop reason: HOSPADM

## 2025-08-09 RX ORDER — AMLODIPINE BESYLATE 5 MG/1
2.5 TABLET ORAL DAILY
Status: DISCONTINUED | OUTPATIENT
Start: 2025-08-09 | End: 2025-08-10

## 2025-08-09 RX ORDER — BUDESONIDE AND FORMOTEROL FUMARATE DIHYDRATE 160; 4.5 UG/1; UG/1
2 AEROSOL RESPIRATORY (INHALATION)
Status: DISCONTINUED | OUTPATIENT
Start: 2025-08-09 | End: 2025-08-09 | Stop reason: CLARIF

## 2025-08-09 RX ORDER — HYDROCORTISONE ACETATE 25 MG/1
25 SUPPOSITORY RECTAL 2 TIMES DAILY
Status: DISCONTINUED | OUTPATIENT
Start: 2025-08-09 | End: 2025-08-10 | Stop reason: HOSPADM

## 2025-08-09 RX ORDER — PRAVASTATIN SODIUM 80 MG/1
80 TABLET ORAL NIGHTLY
Status: DISCONTINUED | OUTPATIENT
Start: 2025-08-09 | End: 2025-08-10 | Stop reason: HOSPADM

## 2025-08-09 RX ORDER — ONDANSETRON 2 MG/ML
4 INJECTION INTRAMUSCULAR; INTRAVENOUS EVERY 6 HOURS PRN
Status: DISCONTINUED | OUTPATIENT
Start: 2025-08-09 | End: 2025-08-10 | Stop reason: HOSPADM

## 2025-08-09 RX ORDER — ACETAMINOPHEN 650 MG/1
650 SUPPOSITORY RECTAL EVERY 6 HOURS PRN
Status: DISCONTINUED | OUTPATIENT
Start: 2025-08-09 | End: 2025-08-10 | Stop reason: HOSPADM

## 2025-08-09 RX ORDER — SODIUM CHLORIDE 9 MG/ML
INJECTION, SOLUTION INTRAVENOUS PRN
Status: DISCONTINUED | OUTPATIENT
Start: 2025-08-09 | End: 2025-08-10 | Stop reason: HOSPADM

## 2025-08-09 RX ORDER — SODIUM CHLORIDE 0.9 % (FLUSH) 0.9 %
5-40 SYRINGE (ML) INJECTION PRN
Status: DISCONTINUED | OUTPATIENT
Start: 2025-08-09 | End: 2025-08-10 | Stop reason: HOSPADM

## 2025-08-09 RX ORDER — POTASSIUM CHLORIDE 7.45 MG/ML
10 INJECTION INTRAVENOUS PRN
Status: DISCONTINUED | OUTPATIENT
Start: 2025-08-09 | End: 2025-08-10 | Stop reason: HOSPADM

## 2025-08-09 RX ORDER — ONDANSETRON 4 MG/1
4 TABLET, ORALLY DISINTEGRATING ORAL EVERY 8 HOURS PRN
Status: DISCONTINUED | OUTPATIENT
Start: 2025-08-09 | End: 2025-08-10 | Stop reason: HOSPADM

## 2025-08-09 RX ORDER — FUROSEMIDE 20 MG/1
20 TABLET ORAL DAILY
Status: DISCONTINUED | OUTPATIENT
Start: 2025-08-09 | End: 2025-08-10 | Stop reason: HOSPADM

## 2025-08-09 RX ORDER — SUCRALFATE 1 G/1
2 TABLET ORAL EVERY 12 HOURS
Status: DISCONTINUED | OUTPATIENT
Start: 2025-08-09 | End: 2025-08-10 | Stop reason: HOSPADM

## 2025-08-09 RX ORDER — SENNA AND DOCUSATE SODIUM 50; 8.6 MG/1; MG/1
2 TABLET, FILM COATED ORAL 2 TIMES DAILY
Status: DISCONTINUED | OUTPATIENT
Start: 2025-08-09 | End: 2025-08-10 | Stop reason: HOSPADM

## 2025-08-09 RX ORDER — ASPIRIN 81 MG/1
81 TABLET ORAL DAILY
Status: DISCONTINUED | OUTPATIENT
Start: 2025-08-09 | End: 2025-08-09

## 2025-08-09 RX ORDER — SODIUM CHLORIDE 9 MG/ML
INJECTION, SOLUTION INTRAVENOUS CONTINUOUS
Status: ACTIVE | OUTPATIENT
Start: 2025-08-09 | End: 2025-08-10

## 2025-08-09 RX ORDER — ALBUTEROL SULFATE 0.83 MG/ML
2.5 SOLUTION RESPIRATORY (INHALATION) EVERY 6 HOURS PRN
Status: DISCONTINUED | OUTPATIENT
Start: 2025-08-09 | End: 2025-08-10 | Stop reason: HOSPADM

## 2025-08-09 RX ORDER — LISINOPRIL 20 MG/1
20 TABLET ORAL 2 TIMES DAILY
Status: DISCONTINUED | OUTPATIENT
Start: 2025-08-09 | End: 2025-08-10 | Stop reason: HOSPADM

## 2025-08-09 RX ORDER — MAGNESIUM SULFATE IN WATER 40 MG/ML
2000 INJECTION, SOLUTION INTRAVENOUS PRN
Status: DISCONTINUED | OUTPATIENT
Start: 2025-08-09 | End: 2025-08-10 | Stop reason: HOSPADM

## 2025-08-09 RX ORDER — FERROUS SULFATE 325(65) MG
325 TABLET ORAL
Status: DISCONTINUED | OUTPATIENT
Start: 2025-08-09 | End: 2025-08-10 | Stop reason: HOSPADM

## 2025-08-09 RX ORDER — POLYETHYLENE GLYCOL 3350 17 G/17G
17 POWDER, FOR SOLUTION ORAL DAILY
Status: DISCONTINUED | OUTPATIENT
Start: 2025-08-09 | End: 2025-08-10 | Stop reason: HOSPADM

## 2025-08-09 RX ORDER — DOXAZOSIN 4 MG/1
8 TABLET ORAL 2 TIMES DAILY
Status: DISCONTINUED | OUTPATIENT
Start: 2025-08-09 | End: 2025-08-10 | Stop reason: HOSPADM

## 2025-08-09 RX ORDER — METOPROLOL TARTRATE 25 MG/1
12.5 TABLET, FILM COATED ORAL EVERY 12 HOURS
Status: DISCONTINUED | OUTPATIENT
Start: 2025-08-09 | End: 2025-08-10

## 2025-08-09 RX ORDER — 0.9 % SODIUM CHLORIDE 0.9 %
1000 INTRAVENOUS SOLUTION INTRAVENOUS ONCE
Status: COMPLETED | OUTPATIENT
Start: 2025-08-09 | End: 2025-08-09

## 2025-08-09 RX ORDER — LACTULOSE 10 G/15ML
20 SOLUTION ORAL 2 TIMES DAILY
Status: DISCONTINUED | OUTPATIENT
Start: 2025-08-09 | End: 2025-08-10 | Stop reason: HOSPADM

## 2025-08-09 RX ORDER — SODIUM CHLORIDE 0.9 % (FLUSH) 0.9 %
5-40 SYRINGE (ML) INJECTION EVERY 12 HOURS SCHEDULED
Status: DISCONTINUED | OUTPATIENT
Start: 2025-08-09 | End: 2025-08-10 | Stop reason: HOSPADM

## 2025-08-09 RX ORDER — ACETAMINOPHEN 325 MG/1
650 TABLET ORAL EVERY 6 HOURS PRN
Status: DISCONTINUED | OUTPATIENT
Start: 2025-08-09 | End: 2025-08-10 | Stop reason: HOSPADM

## 2025-08-09 RX ADMIN — LACTULOSE 20 G: 10 SOLUTION ORAL at 20:51

## 2025-08-09 RX ADMIN — LISINOPRIL 20 MG: 20 TABLET ORAL at 10:34

## 2025-08-09 RX ADMIN — DOXAZOSIN 8 MG: 4 TABLET ORAL at 09:19

## 2025-08-09 RX ADMIN — ARFORMOTEROL TARTRATE: 15 SOLUTION RESPIRATORY (INHALATION) at 19:25

## 2025-08-09 RX ADMIN — HYDROCORTISONE ACETATE 25 MG: 25 SUPPOSITORY RECTAL at 10:40

## 2025-08-09 RX ADMIN — SODIUM CHLORIDE: 0.9 INJECTION, SOLUTION INTRAVENOUS at 20:58

## 2025-08-09 RX ADMIN — SODIUM CHLORIDE 1000 ML: 900 INJECTION, SOLUTION INTRAVENOUS at 01:05

## 2025-08-09 RX ADMIN — HYDROCORTISONE ACETATE 25 MG: 25 SUPPOSITORY RECTAL at 21:02

## 2025-08-09 RX ADMIN — METOPROLOL TARTRATE 12.5 MG: 25 TABLET, FILM COATED ORAL at 20:50

## 2025-08-09 RX ADMIN — SODIUM CHLORIDE: 0.9 INJECTION, SOLUTION INTRAVENOUS at 06:55

## 2025-08-09 RX ADMIN — DOCUSATE SODIUM 50 MG AND SENNOSIDES 8.6 MG 2 TABLET: 8.6; 5 TABLET, FILM COATED ORAL at 20:50

## 2025-08-09 RX ADMIN — IPRATROPIUM BROMIDE 0.5 MG: 0.5 SOLUTION RESPIRATORY (INHALATION) at 08:23

## 2025-08-09 RX ADMIN — IPRATROPIUM BROMIDE 0.5 MG: 0.5 SOLUTION RESPIRATORY (INHALATION) at 15:14

## 2025-08-09 RX ADMIN — METOPROLOL TARTRATE 12.5 MG: 25 TABLET, FILM COATED ORAL at 09:19

## 2025-08-09 RX ADMIN — IPRATROPIUM BROMIDE 0.5 MG: 0.5 SOLUTION RESPIRATORY (INHALATION) at 11:10

## 2025-08-09 RX ADMIN — LISINOPRIL 20 MG: 20 TABLET ORAL at 20:50

## 2025-08-09 RX ADMIN — POLYETHYLENE GLYCOL 3350 17 G: 17 POWDER, FOR SOLUTION ORAL at 10:38

## 2025-08-09 RX ADMIN — SODIUM CHLORIDE, PRESERVATIVE FREE 10 ML: 5 INJECTION INTRAVENOUS at 09:19

## 2025-08-09 RX ADMIN — LACTULOSE 20 G: 10 SOLUTION ORAL at 13:59

## 2025-08-09 RX ADMIN — DOXAZOSIN 8 MG: 4 TABLET ORAL at 20:50

## 2025-08-09 RX ADMIN — FERROUS SULFATE TAB 325 MG (65 MG ELEMENTAL FE) 325 MG: 325 (65 FE) TAB at 09:19

## 2025-08-09 RX ADMIN — DOCUSATE SODIUM 50 MG AND SENNOSIDES 8.6 MG 2 TABLET: 8.6; 5 TABLET, FILM COATED ORAL at 10:38

## 2025-08-09 RX ADMIN — ARFORMOTEROL TARTRATE: 15 SOLUTION RESPIRATORY (INHALATION) at 11:10

## 2025-08-09 RX ADMIN — PRAVASTATIN SODIUM 80 MG: 80 TABLET ORAL at 20:50

## 2025-08-09 RX ADMIN — IOPAMIDOL 100 ML: 755 INJECTION, SOLUTION INTRAVENOUS at 02:47

## 2025-08-09 RX ADMIN — IPRATROPIUM BROMIDE 0.5 MG: 0.5 SOLUTION RESPIRATORY (INHALATION) at 19:24

## 2025-08-09 RX ADMIN — SUCRALFATE 2 G: 1 TABLET ORAL at 13:59

## 2025-08-09 RX ADMIN — AMLODIPINE BESYLATE 2.5 MG: 5 TABLET ORAL at 09:19

## 2025-08-09 ASSESSMENT — PAIN SCALES - GENERAL
PAINLEVEL_OUTOF10: 0
PAINLEVEL_OUTOF10: 0

## 2025-08-10 VITALS
WEIGHT: 200 LBS | TEMPERATURE: 97.3 F | BODY MASS INDEX: 29.62 KG/M2 | RESPIRATION RATE: 18 BRPM | SYSTOLIC BLOOD PRESSURE: 122 MMHG | HEART RATE: 56 BPM | OXYGEN SATURATION: 99 % | DIASTOLIC BLOOD PRESSURE: 72 MMHG | HEIGHT: 69 IN

## 2025-08-10 LAB
ANION GAP SERPL CALC-SCNC: 7 MMOL/L (ref 7–16)
BASOPHILS # BLD: 0.02 K/UL (ref 0–0.2)
BASOPHILS NFR BLD: 0.7 % (ref 0–2)
BUN SERPL-MCNC: 7 MG/DL (ref 8–23)
CALCIUM SERPL-MCNC: 8.5 MG/DL (ref 8.8–10.2)
CHLORIDE SERPL-SCNC: 100 MMOL/L (ref 98–107)
CO2 SERPL-SCNC: 25 MMOL/L (ref 20–29)
CREAT SERPL-MCNC: 0.59 MG/DL (ref 0.8–1.3)
DIFFERENTIAL METHOD BLD: ABNORMAL
EOSINOPHIL # BLD: 0.07 K/UL (ref 0–0.8)
EOSINOPHIL NFR BLD: 2.4 % (ref 0.5–7.8)
ERYTHROCYTE [DISTWIDTH] IN BLOOD BY AUTOMATED COUNT: 13.9 % (ref 11.9–14.6)
GLUCOSE SERPL-MCNC: 83 MG/DL (ref 70–99)
HCT VFR BLD AUTO: 26.8 % (ref 41.1–50.3)
HGB BLD-MCNC: 9.1 G/DL (ref 13.6–17.2)
IMM GRANULOCYTES # BLD AUTO: 0.01 K/UL (ref 0–0.5)
IMM GRANULOCYTES NFR BLD AUTO: 0.3 % (ref 0–5)
LYMPHOCYTES # BLD: 0.44 K/UL (ref 0.5–4.6)
LYMPHOCYTES NFR BLD: 15.1 % (ref 13–44)
MAGNESIUM SERPL-MCNC: 1.9 MG/DL (ref 1.8–2.4)
MCH RBC QN AUTO: 31.2 PG (ref 26.1–32.9)
MCHC RBC AUTO-ENTMCNC: 34 G/DL (ref 31.4–35)
MCV RBC AUTO: 91.8 FL (ref 82–102)
MONOCYTES # BLD: 0.63 K/UL (ref 0.1–1.3)
MONOCYTES NFR BLD: 21.6 % (ref 4–12)
NEUTS SEG # BLD: 1.74 K/UL (ref 1.7–8.2)
NEUTS SEG NFR BLD: 59.9 % (ref 43–78)
NRBC # BLD: 0 K/UL (ref 0–0.2)
PLATELET # BLD AUTO: 125 K/UL (ref 150–450)
PMV BLD AUTO: 8.8 FL (ref 9.4–12.3)
POTASSIUM SERPL-SCNC: 3.5 MMOL/L (ref 3.5–5.1)
RBC # BLD AUTO: 2.92 M/UL (ref 4.23–5.6)
SODIUM SERPL-SCNC: 133 MMOL/L (ref 136–145)
WBC # BLD AUTO: 2.9 K/UL (ref 4.3–11.1)

## 2025-08-10 PROCEDURE — 94760 N-INVAS EAR/PLS OXIMETRY 1: CPT

## 2025-08-10 PROCEDURE — 97116 GAIT TRAINING THERAPY: CPT

## 2025-08-10 PROCEDURE — 94640 AIRWAY INHALATION TREATMENT: CPT

## 2025-08-10 PROCEDURE — 99222 1ST HOSP IP/OBS MODERATE 55: CPT | Performed by: INTERNAL MEDICINE

## 2025-08-10 PROCEDURE — 6370000000 HC RX 637 (ALT 250 FOR IP): Performed by: PHYSICIAN ASSISTANT

## 2025-08-10 PROCEDURE — 6370000000 HC RX 637 (ALT 250 FOR IP): Performed by: FAMILY MEDICINE

## 2025-08-10 PROCEDURE — 97161 PT EVAL LOW COMPLEX 20 MIN: CPT

## 2025-08-10 PROCEDURE — 2500000003 HC RX 250 WO HCPCS: Performed by: FAMILY MEDICINE

## 2025-08-10 PROCEDURE — 85025 COMPLETE CBC W/AUTO DIFF WBC: CPT

## 2025-08-10 PROCEDURE — 83735 ASSAY OF MAGNESIUM: CPT

## 2025-08-10 PROCEDURE — 36415 COLL VENOUS BLD VENIPUNCTURE: CPT

## 2025-08-10 PROCEDURE — 97530 THERAPEUTIC ACTIVITIES: CPT

## 2025-08-10 PROCEDURE — 97165 OT EVAL LOW COMPLEX 30 MIN: CPT

## 2025-08-10 PROCEDURE — 80048 BASIC METABOLIC PNL TOTAL CA: CPT

## 2025-08-10 PROCEDURE — 6360000002 HC RX W HCPCS: Performed by: FAMILY MEDICINE

## 2025-08-10 RX ORDER — HYDROCORTISONE ACETATE 25 MG/1
25 SUPPOSITORY RECTAL 2 TIMES DAILY
Qty: 17 SUPPOSITORY | Refills: 0 | Status: SHIPPED | OUTPATIENT
Start: 2025-08-10 | End: 2025-08-19

## 2025-08-10 RX ORDER — CARVEDILOL 6.25 MG/1
6.25 TABLET ORAL 2 TIMES DAILY
Qty: 180 TABLET | Refills: 0 | Status: SHIPPED | OUTPATIENT
Start: 2025-08-10

## 2025-08-10 RX ORDER — POLYETHYLENE GLYCOL 3350 17 G/17G
17 POWDER, FOR SOLUTION ORAL DAILY
Qty: 100 PACKET | Refills: 0 | Status: SHIPPED | OUTPATIENT
Start: 2025-08-11 | End: 2025-11-09

## 2025-08-10 RX ORDER — SENNA AND DOCUSATE SODIUM 50; 8.6 MG/1; MG/1
2 TABLET, FILM COATED ORAL 2 TIMES DAILY
Qty: 60 TABLET | Refills: 1 | Status: SHIPPED | OUTPATIENT
Start: 2025-08-10 | End: 2025-08-10

## 2025-08-10 RX ORDER — LACTULOSE 10 G/15ML
20 SOLUTION ORAL 2 TIMES DAILY
Qty: 946 ML | Refills: 2 | Status: SHIPPED | OUTPATIENT
Start: 2025-08-10

## 2025-08-10 RX ORDER — POLYETHYLENE GLYCOL 3350 17 G/17G
17 POWDER, FOR SOLUTION ORAL DAILY
Qty: 527 G | Refills: 1 | Status: SHIPPED | OUTPATIENT
Start: 2025-08-11 | End: 2025-08-10

## 2025-08-10 RX ORDER — SUCRALFATE 1 G/1
2 TABLET ORAL EVERY 12 HOURS
Qty: 240 TABLET | Refills: 0 | Status: SHIPPED | OUTPATIENT
Start: 2025-08-10 | End: 2025-10-09

## 2025-08-10 RX ORDER — CARVEDILOL 6.25 MG/1
6.25 TABLET ORAL 2 TIMES DAILY
Qty: 60 TABLET | Refills: 1 | Status: SHIPPED | OUTPATIENT
Start: 2025-08-10 | End: 2025-08-10

## 2025-08-10 RX ORDER — SENNA AND DOCUSATE SODIUM 50; 8.6 MG/1; MG/1
2 TABLET, FILM COATED ORAL 2 TIMES DAILY
Qty: 360 TABLET | Refills: 0 | Status: SHIPPED | OUTPATIENT
Start: 2025-08-10

## 2025-08-10 RX ORDER — HYDROCHLOROTHIAZIDE 25 MG/1
12.5 TABLET ORAL DAILY
Status: DISCONTINUED | OUTPATIENT
Start: 2025-08-11 | End: 2025-08-10

## 2025-08-10 RX ORDER — CARVEDILOL 6.25 MG/1
6.25 TABLET ORAL 2 TIMES DAILY WITH MEALS
Status: DISCONTINUED | OUTPATIENT
Start: 2025-08-10 | End: 2025-08-10 | Stop reason: HOSPADM

## 2025-08-10 RX ADMIN — IPRATROPIUM BROMIDE 0.5 MG: 0.5 SOLUTION RESPIRATORY (INHALATION) at 08:08

## 2025-08-10 RX ADMIN — IPRATROPIUM BROMIDE 0.5 MG: 0.5 SOLUTION RESPIRATORY (INHALATION) at 11:00

## 2025-08-10 RX ADMIN — POLYETHYLENE GLYCOL 3350 17 G: 17 POWDER, FOR SOLUTION ORAL at 08:20

## 2025-08-10 RX ADMIN — DOXAZOSIN 8 MG: 4 TABLET ORAL at 08:19

## 2025-08-10 RX ADMIN — AMLODIPINE BESYLATE 2.5 MG: 5 TABLET ORAL at 08:19

## 2025-08-10 RX ADMIN — METOPROLOL TARTRATE 12.5 MG: 25 TABLET, FILM COATED ORAL at 08:19

## 2025-08-10 RX ADMIN — SUCRALFATE 2 G: 1 TABLET ORAL at 01:07

## 2025-08-10 RX ADMIN — HYDROCORTISONE ACETATE 25 MG: 25 SUPPOSITORY RECTAL at 08:25

## 2025-08-10 RX ADMIN — ARFORMOTEROL TARTRATE: 15 SOLUTION RESPIRATORY (INHALATION) at 08:08

## 2025-08-10 RX ADMIN — PANTOPRAZOLE SODIUM 40 MG: 40 TABLET, DELAYED RELEASE ORAL at 06:48

## 2025-08-10 RX ADMIN — DOCUSATE SODIUM 50 MG AND SENNOSIDES 8.6 MG 2 TABLET: 8.6; 5 TABLET, FILM COATED ORAL at 08:19

## 2025-08-10 RX ADMIN — SODIUM CHLORIDE, PRESERVATIVE FREE 10 ML: 5 INJECTION INTRAVENOUS at 08:25

## 2025-08-10 RX ADMIN — LACTULOSE 20 G: 10 SOLUTION ORAL at 08:20

## 2025-08-10 RX ADMIN — FERROUS SULFATE TAB 325 MG (65 MG ELEMENTAL FE) 325 MG: 325 (65 FE) TAB at 08:19

## 2025-08-10 RX ADMIN — LISINOPRIL 20 MG: 20 TABLET ORAL at 08:19

## 2025-08-11 ENCOUNTER — TELEPHONE (OUTPATIENT)
Dept: FAMILY MEDICINE CLINIC | Facility: CLINIC | Age: 80
End: 2025-08-11

## 2025-08-14 ENCOUNTER — OFFICE VISIT (OUTPATIENT)
Dept: FAMILY MEDICINE CLINIC | Facility: CLINIC | Age: 80
End: 2025-08-14
Payer: MEDICARE

## 2025-08-14 VITALS
HEIGHT: 69 IN | DIASTOLIC BLOOD PRESSURE: 68 MMHG | SYSTOLIC BLOOD PRESSURE: 116 MMHG | HEART RATE: 76 BPM | WEIGHT: 199 LBS | OXYGEN SATURATION: 98 % | BODY MASS INDEX: 29.47 KG/M2

## 2025-08-14 DIAGNOSIS — R60.0 BILATERAL LOWER EXTREMITY EDEMA: ICD-10-CM

## 2025-08-14 DIAGNOSIS — E87.1 HYPONATREMIA: ICD-10-CM

## 2025-08-14 DIAGNOSIS — D50.0 IRON DEFICIENCY ANEMIA DUE TO CHRONIC BLOOD LOSS: Primary | ICD-10-CM

## 2025-08-14 DIAGNOSIS — K62.5 RECTAL BLEEDING: ICD-10-CM

## 2025-08-14 DIAGNOSIS — M79.604 BILATERAL LOWER EXTREMITY PAIN: ICD-10-CM

## 2025-08-14 DIAGNOSIS — K64.8 INTERNAL HEMORRHOIDS: ICD-10-CM

## 2025-08-14 DIAGNOSIS — M79.605 BILATERAL LOWER EXTREMITY PAIN: ICD-10-CM

## 2025-08-14 DIAGNOSIS — I25.119 ATHEROSCLEROSIS OF NATIVE CORONARY ARTERY OF NATIVE HEART WITH ANGINA PECTORIS: ICD-10-CM

## 2025-08-14 DIAGNOSIS — I25.10 CAD IN NATIVE ARTERY: ICD-10-CM

## 2025-08-14 DIAGNOSIS — I10 ESSENTIAL HYPERTENSION WITH GOAL BLOOD PRESSURE LESS THAN 140/90: ICD-10-CM

## 2025-08-14 DIAGNOSIS — K57.90 DIVERTICULOSIS: ICD-10-CM

## 2025-08-14 LAB
ANION GAP SERPL CALC-SCNC: 8 MMOL/L (ref 7–16)
BUN SERPL-MCNC: 8 MG/DL (ref 8–23)
CALCIUM SERPL-MCNC: 9 MG/DL (ref 8.8–10.2)
CHLORIDE SERPL-SCNC: 95 MMOL/L (ref 98–107)
CO2 SERPL-SCNC: 28 MMOL/L (ref 20–29)
CREAT SERPL-MCNC: 0.66 MG/DL (ref 0.8–1.3)
GLUCOSE SERPL-MCNC: 97 MG/DL (ref 70–99)
GRANS ABSOLUTE, POC: 2.4 K/UL
GRANULOCYTES %, POC: 68 %
HEMATOCRIT, POC: ABNORMAL %
HEMOGLOBIN, POC: ABNORMAL G/DL
LYMPHOCYTE %, POC: 20.7 %
LYMPHS ABSOLUTE, POC: 0.7 K/UL
MCH, POC: 31 PG (ref 20–?)
MCHC, POC: 32.3
MCV, POC: 96
MONOCYTE %, POC: 11.3 %
MONOCYTE, ABSOLUTE POC: 0.4 K/UL
MPV, POC: 6.4 FL
PLATELET COUNT, POC: 197 K/UL
POTASSIUM SERPL-SCNC: 4 MMOL/L (ref 3.5–5.1)
RBC, POC: ABNORMAL M/UL
RDW, POC: 13.4 %
SODIUM SERPL-SCNC: 131 MMOL/L (ref 136–145)
WBC, POC: ABNORMAL K/UL

## 2025-08-14 PROCEDURE — 1036F TOBACCO NON-USER: CPT | Performed by: FAMILY MEDICINE

## 2025-08-14 PROCEDURE — 85025 COMPLETE CBC W/AUTO DIFF WBC: CPT | Performed by: FAMILY MEDICINE

## 2025-08-14 PROCEDURE — G8417 CALC BMI ABV UP PARAM F/U: HCPCS | Performed by: FAMILY MEDICINE

## 2025-08-14 PROCEDURE — 1111F DSCHRG MED/CURRENT MED MERGE: CPT | Performed by: FAMILY MEDICINE

## 2025-08-14 PROCEDURE — G8428 CUR MEDS NOT DOCUMENT: HCPCS | Performed by: FAMILY MEDICINE

## 2025-08-14 PROCEDURE — 99215 OFFICE O/P EST HI 40 MIN: CPT | Performed by: FAMILY MEDICINE

## 2025-08-14 PROCEDURE — 3074F SYST BP LT 130 MM HG: CPT | Performed by: FAMILY MEDICINE

## 2025-08-14 PROCEDURE — 3078F DIAST BP <80 MM HG: CPT | Performed by: FAMILY MEDICINE

## 2025-08-14 PROCEDURE — 1123F ACP DISCUSS/DSCN MKR DOCD: CPT | Performed by: FAMILY MEDICINE

## 2025-08-14 ASSESSMENT — ENCOUNTER SYMPTOMS
NAUSEA: 0
SHORTNESS OF BREATH: 0
VOMITING: 0

## 2025-08-21 ENCOUNTER — OFFICE VISIT (OUTPATIENT)
Dept: FAMILY MEDICINE CLINIC | Facility: CLINIC | Age: 80
End: 2025-08-21
Payer: MEDICARE

## 2025-08-21 VITALS
SYSTOLIC BLOOD PRESSURE: 120 MMHG | WEIGHT: 203 LBS | BODY MASS INDEX: 29.98 KG/M2 | HEART RATE: 66 BPM | DIASTOLIC BLOOD PRESSURE: 62 MMHG

## 2025-08-21 DIAGNOSIS — K59.04 CHRONIC IDIOPATHIC CONSTIPATION: Primary | ICD-10-CM

## 2025-08-21 DIAGNOSIS — E87.1 HYPONATREMIA: ICD-10-CM

## 2025-08-21 DIAGNOSIS — K62.5 RECTAL BLEEDING: ICD-10-CM

## 2025-08-21 DIAGNOSIS — D64.9 ANEMIA, UNSPECIFIED TYPE: ICD-10-CM

## 2025-08-21 PROCEDURE — 1159F MED LIST DOCD IN RCRD: CPT | Performed by: FAMILY MEDICINE

## 2025-08-21 PROCEDURE — 99215 OFFICE O/P EST HI 40 MIN: CPT | Performed by: FAMILY MEDICINE

## 2025-08-21 PROCEDURE — 1111F DSCHRG MED/CURRENT MED MERGE: CPT | Performed by: FAMILY MEDICINE

## 2025-08-21 PROCEDURE — 1036F TOBACCO NON-USER: CPT | Performed by: FAMILY MEDICINE

## 2025-08-21 PROCEDURE — 3074F SYST BP LT 130 MM HG: CPT | Performed by: FAMILY MEDICINE

## 2025-08-21 PROCEDURE — 3078F DIAST BP <80 MM HG: CPT | Performed by: FAMILY MEDICINE

## 2025-08-21 PROCEDURE — G8417 CALC BMI ABV UP PARAM F/U: HCPCS | Performed by: FAMILY MEDICINE

## 2025-08-21 PROCEDURE — G8427 DOCREV CUR MEDS BY ELIG CLIN: HCPCS | Performed by: FAMILY MEDICINE

## 2025-08-21 PROCEDURE — 1123F ACP DISCUSS/DSCN MKR DOCD: CPT | Performed by: FAMILY MEDICINE

## 2025-08-21 ASSESSMENT — ENCOUNTER SYMPTOMS
DIARRHEA: 0
COUGH: 0
RHINORRHEA: 0
ABDOMINAL PAIN: 0
SHORTNESS OF BREATH: 0
SINUS PAIN: 0

## 2025-08-27 ENCOUNTER — TELEPHONE (OUTPATIENT)
Dept: CARDIAC CATH/INVASIVE PROCEDURES | Age: 80
End: 2025-08-27

## 2025-08-27 ENCOUNTER — OFFICE VISIT (OUTPATIENT)
Age: 80
End: 2025-08-27
Payer: MEDICARE

## 2025-08-27 VITALS
WEIGHT: 202 LBS | HEART RATE: 64 BPM | DIASTOLIC BLOOD PRESSURE: 70 MMHG | HEIGHT: 69 IN | SYSTOLIC BLOOD PRESSURE: 118 MMHG | BODY MASS INDEX: 29.92 KG/M2

## 2025-08-27 DIAGNOSIS — K92.2 LOWER GI BLEED: ICD-10-CM

## 2025-08-27 DIAGNOSIS — I89.0 LYMPHEDEMA: ICD-10-CM

## 2025-08-27 DIAGNOSIS — I48.0 PAROXYSMAL ATRIAL FIBRILLATION (HCC): ICD-10-CM

## 2025-08-27 DIAGNOSIS — I10 ESSENTIAL HYPERTENSION WITH GOAL BLOOD PRESSURE LESS THAN 140/90: ICD-10-CM

## 2025-08-27 DIAGNOSIS — I25.10 CORONARY ARTERY DISEASE INVOLVING NATIVE CORONARY ARTERY OF NATIVE HEART WITHOUT ANGINA PECTORIS: Primary | ICD-10-CM

## 2025-08-27 PROCEDURE — G8417 CALC BMI ABV UP PARAM F/U: HCPCS | Performed by: INTERNAL MEDICINE

## 2025-08-27 PROCEDURE — 1036F TOBACCO NON-USER: CPT | Performed by: INTERNAL MEDICINE

## 2025-08-27 PROCEDURE — G8427 DOCREV CUR MEDS BY ELIG CLIN: HCPCS | Performed by: INTERNAL MEDICINE

## 2025-08-27 PROCEDURE — 3078F DIAST BP <80 MM HG: CPT | Performed by: INTERNAL MEDICINE

## 2025-08-27 PROCEDURE — 93000 ELECTROCARDIOGRAM COMPLETE: CPT | Performed by: INTERNAL MEDICINE

## 2025-08-27 PROCEDURE — 1111F DSCHRG MED/CURRENT MED MERGE: CPT | Performed by: INTERNAL MEDICINE

## 2025-08-27 PROCEDURE — 99214 OFFICE O/P EST MOD 30 MIN: CPT | Performed by: INTERNAL MEDICINE

## 2025-08-27 PROCEDURE — 1126F AMNT PAIN NOTED NONE PRSNT: CPT | Performed by: INTERNAL MEDICINE

## 2025-08-27 PROCEDURE — 1123F ACP DISCUSS/DSCN MKR DOCD: CPT | Performed by: INTERNAL MEDICINE

## 2025-08-27 PROCEDURE — 3074F SYST BP LT 130 MM HG: CPT | Performed by: INTERNAL MEDICINE

## 2025-08-27 PROCEDURE — 1159F MED LIST DOCD IN RCRD: CPT | Performed by: INTERNAL MEDICINE

## 2025-08-27 ASSESSMENT — ENCOUNTER SYMPTOMS
SHORTNESS OF BREATH: 1
BACK PAIN: 1

## 2025-08-28 DIAGNOSIS — R06.09 DYSPNEA ON EXERTION: ICD-10-CM

## 2025-08-28 DIAGNOSIS — J44.9 CHRONIC OBSTRUCTIVE PULMONARY DISEASE, UNSPECIFIED COPD TYPE (HCC): ICD-10-CM

## 2025-08-28 RX ORDER — BUDESONIDE, GLYCOPYRROLATE, AND FORMOTEROL FUMARATE 160; 9; 4.8 UG/1; UG/1; UG/1
2 AEROSOL, METERED RESPIRATORY (INHALATION) 2 TIMES DAILY
Qty: 3 EACH | Refills: 3 | Status: SHIPPED | OUTPATIENT
Start: 2025-08-28

## (undated) DEVICE — ENDOSCOPIC KIT 1.1+ OP4 CA DE 2 GWN AAMI LEVEL 3

## (undated) DEVICE — KENDALL RADIOLUCENT FOAM MONITORING ELECTRODE RECTANGULAR SHAPE: Brand: KENDALL

## (undated) DEVICE — BALLOON US E LIN RNG O KT FOR FG-32UA

## (undated) DEVICE — YANKAUER,BULB TIP,W/O VENT,RIGID,STERILE: Brand: MEDLINE

## (undated) DEVICE — CONTAINER PREFIL FRMLN 40ML --

## (undated) DEVICE — SUTURE STRATAFIX SYMMETRIC SZ 1 L18IN ABSRB VLT CT1 L36CM SXPP1A404

## (undated) DEVICE — SINGLE PORT MANIFOLD: Brand: NEPTUNE 2

## (undated) DEVICE — SHEET, T, LAPAROTOMY, STERILE: Brand: MEDLINE

## (undated) DEVICE — CATHETER GUID 067IN 6FR 100CM VASC EXTRA BKUP 4 COR W O

## (undated) DEVICE — DISPOSABLE BIOPSY VALVE MAJ-1555: Brand: SINGLE USE BIOPSY VALVE (STERILE)

## (undated) DEVICE — INTENDED FOR TISSUE SEPARATION, AND OTHER PROCEDURES THAT REQUIRE A SHARP SURGICAL BLADE TO PUNCTURE OR CUT.: Brand: BARD-PARKER SAFETY BLADES SIZE 15, STERILE

## (undated) DEVICE — BUTTON SWITCH PENCIL BLADE ELECTRODE, HOLSTER: Brand: EDGE

## (undated) DEVICE — SYRINGE MEDICAL 3ML CLEAR PLASTIC STANDARD NON CONTROL LUERLOCK TIP DISPOSABLE

## (undated) DEVICE — SUTURE VCRL SZ 3-0 L18IN ABSRB UD W/O NDL POLYGLACTIN 910 J110T

## (undated) DEVICE — GAUZE,SPONGE,4"X4",12PLY,WOVEN,NS,LF: Brand: MEDLINE

## (undated) DEVICE — SUTURE VCRL SZ 3-0 L27IN ABSRB UD L26MM SH 1/2 CIR J416H

## (undated) DEVICE — AIRSEAL BIFURCATED FILTERED TUBESET WITH ACTIVATED CHARCOAL FILTER: Brand: AIRSEAL

## (undated) DEVICE — SYR 5ML 1/5 GRAD LL NSAF LF --

## (undated) DEVICE — CONNECTOR TBNG OD5-7MM O2 END DISP

## (undated) DEVICE — SOLUTION IV 1000ML 0.9% SOD CHL

## (undated) DEVICE — RESERVOIR,SUCTION,100CC,SILICONE: Brand: MEDLINE

## (undated) DEVICE — AIRLIFE™ OXYGEN TUBING 7 FEET (2.1 M) CRUSH RESISTANT OXYGEN TUBING, VINYL TIPPED: Brand: AIRLIFE™

## (undated) DEVICE — GLIDESHEATH SLENDER STAINLESS STEEL KIT: Brand: GLIDESHEATH SLENDER

## (undated) DEVICE — ARM DRAPE

## (undated) DEVICE — CANNULA SEAL

## (undated) DEVICE — BLOCK BITE AD 60FR W/ VELC STRP ADDRESSES MOST PT AND

## (undated) DEVICE — TRAY PREP DRY W/ PREM GLV 2 APPL 6 SPNG 2 UNDPD 1 OVERWRAP

## (undated) DEVICE — MAX-CORE® DISPOSABLE CORE BIOPSY INSTRUMENT, 18G X 25CM: Brand: MAX-CORE

## (undated) DEVICE — SUTURE PERMA-HAND SZ 2-0 L30IN NONABSORBABLE BLK L26MM SH K833H

## (undated) DEVICE — Device

## (undated) DEVICE — CATHETER 5FR CORDIS PIG 145DEG 110CM

## (undated) DEVICE — TROCAR: Brand: KII FIOS FIRST ENTRY

## (undated) DEVICE — ELECTRO LUBE IS A SINGLE PATIENT USE DEVICE THAT IS INTENDED TO BE USED ON ELECTROSURGICAL ELECTRODES TO REDUCE STICKING.: Brand: KEY SURGICAL ELECTRO LUBE

## (undated) DEVICE — KENDALL SCD EXPRESS SLEEVES, KNEE LENGTH, MEDIUM: Brand: KENDALL SCD

## (undated) DEVICE — GUIDEWIRE 035IN 210CM PTFE COAT FIX COR J TIP 15MM FIRM BODY

## (undated) DEVICE — NDL PRT INJ NSAF BLNT 18GX1.5 --

## (undated) DEVICE — SPONGE TONSIL DBL LG 0.625 IN

## (undated) DEVICE — DRAIN SURG 15FR SIL RND CHN W/ TRCR FULL FLUT DBL WRP TRAD

## (undated) DEVICE — MOUTHPIECE ENDOSCP L CTRL OPN AND SIDE PORTS DISP

## (undated) DEVICE — FORCEPS BX L240CM JAW DIA2.4MM ORNG L CAP W/ NDL DISP RAD

## (undated) DEVICE — SYR 3ML LL TIP 1/10ML GRAD --

## (undated) DEVICE — LIQUIBAND RAPID ADHESIVE 36/CS 0.8ML: Brand: MEDLINE

## (undated) DEVICE — SUTURE V-LOC 180 SZ 0 L12IN ABSRB GRN L37MM GS-21 1/2 CIR VLOCL0316

## (undated) DEVICE — STERILE PACKED. BORE DIAMETER 1.6 MM; ANGLE OF INSERTION 19° TO THE LONG AXIS OF THE TRANSDUCER: Brand: SINGLE-USE BIPLANE BIOPSY GUIDE

## (undated) DEVICE — FCPS BX HOT RJ4 2.2MMX240CM -- RADIAL JAW 4 BX/40

## (undated) DEVICE — SURGICAL PROCEDURE PACK BASIC ST FRANCIS

## (undated) DEVICE — HI-TORQUE VERSACORE MODIFIED J GUIDE WIRE SYSTEM 145 CM: Brand: HI-TORQUE VERSACORE

## (undated) DEVICE — CONTAINER FORMALIN PREFILLED 10% NBF 60ML

## (undated) DEVICE — CATHETER 5FR JR4 CORDIS 100CM

## (undated) DEVICE — STERILE LATEX POWDER FREE SURGICAL GLOVES WITH HYDROGEL COATING: Brand: PROTEXIS

## (undated) DEVICE — SOLUTION IRRIG 1000ML H2O PIC PLAS SHATTERPROOF CONTAINER

## (undated) DEVICE — SUT PROL 2-0 30IN CT2 BLU --

## (undated) DEVICE — REM POLYHESIVE ADULT PATIENT RETURN ELECTRODE: Brand: VALLEYLAB

## (undated) DEVICE — CANNULA NSL ORAL AD FOR CAPNOFLEX CO2 O2 AIRLFE

## (undated) DEVICE — STANDARD HYPODERMIC NEEDLE,POLYPROPYLENE HUB: Brand: MONOJECT

## (undated) DEVICE — CATHETER 5FR IM CORDIS 100CM

## (undated) DEVICE — SUTURE V LOC 1 L18IN NONABSORBABLE GS-21 TAPERPOINT NDL VLOCN0327

## (undated) DEVICE — LUBE JELLY FOIL PACK 1.4 OZ: Brand: MEDLINE INDUSTRIES, INC.

## (undated) DEVICE — AIRSEAL 8 MM CANNULA CAP AND OBTURATOR WITH BLADELESS OPTICAL TIP COMPATIBLE WITH INTUITIVE DA VINCI XI AND DA VINCI X 8 MM INSTRUMENT CANNULA, STANDARD LENGTH: Brand: AIRSEAL

## (undated) DEVICE — TUBING, SUCTION, 1/4" X 10', STRAIGHT: Brand: MEDLINE

## (undated) DEVICE — DEVICE FIX OPN ABSRB STRP SECURESTRP

## (undated) DEVICE — TIP COVER ACCESSORY

## (undated) DEVICE — (D)PREP SKN CHLRAPRP APPL 26ML -- CONVERT TO ITEM 371833

## (undated) DEVICE — BAND COMPR L24CM REG CLR PLAS HEMSTAT EXT HK AND LOOP RETEN

## (undated) DEVICE — SUTURE ABS ANTIBACT 1-0 CTX 24IN STRATAFIX PDS+ SXPP1A445

## (undated) DEVICE — BLADELESS OBTURATOR: Brand: WECK VISTA

## (undated) DEVICE — COLUMN DRAPE

## (undated) DEVICE — SYRINGE MED 10ML LUERLOCK TIP W/O SFTY DISP

## (undated) DEVICE — GENERAL LAPAROSCOPY: Brand: MEDLINE INDUSTRIES, INC.

## (undated) DEVICE — SUTURE MCRYL SZ 4-0 L27IN ABSRB UD L19MM PS-2 1/2 CIR PRIM Y426H

## (undated) DEVICE — NEEDLE SYRINGE 18GA L1.5IN RED PLAS HUB S STL BLNT FILL W/O

## (undated) DEVICE — KIT BX PROST 20ML VI PREFIL W 10ML 10% NEUT BUFF FRMLN LEAK

## (undated) DEVICE — 3M™ TEGADERM™ TRANSPARENT FILM DRESSING FRAME STYLE, 1626W, 4 IN X 4-3/4 IN (10 CM X 12 CM), 50/CT 4CT/CASE: Brand: 3M™ TEGADERM™

## (undated) DEVICE — CATHETER DIAG AD 5FR L125CM COR NYL MP AMPLATZ 2 W/ 2 SIDE

## (undated) DEVICE — SUTURE PROL SZ 0 L30IN NONABSORBABLE BLU L26MM CT-2 1/2 CIR 8412H